# Patient Record
Sex: MALE | Race: WHITE | NOT HISPANIC OR LATINO | Employment: OTHER | URBAN - METROPOLITAN AREA
[De-identification: names, ages, dates, MRNs, and addresses within clinical notes are randomized per-mention and may not be internally consistent; named-entity substitution may affect disease eponyms.]

---

## 2017-01-01 ENCOUNTER — INFUSION (OUTPATIENT)
Dept: ONCOLOGY | Facility: HOSPITAL | Age: 72
End: 2017-01-01

## 2017-01-01 ENCOUNTER — APPOINTMENT (OUTPATIENT)
Dept: CARDIOLOGY | Facility: HOSPITAL | Age: 72
End: 2017-01-01
Attending: INTERNAL MEDICINE

## 2017-01-01 ENCOUNTER — OUTSIDE FACILITY SERVICE (OUTPATIENT)
Dept: HOSPITALIST | Facility: HOSPITAL | Age: 72
End: 2017-01-01

## 2017-01-01 ENCOUNTER — ANESTHESIA (OUTPATIENT)
Dept: GASTROENTEROLOGY | Facility: HOSPITAL | Age: 72
End: 2017-01-01

## 2017-01-01 ENCOUNTER — APPOINTMENT (OUTPATIENT)
Dept: CT IMAGING | Facility: HOSPITAL | Age: 72
End: 2017-01-01

## 2017-01-01 ENCOUNTER — APPOINTMENT (OUTPATIENT)
Dept: CT IMAGING | Facility: HOSPITAL | Age: 72
End: 2017-01-01
Attending: HOSPITALIST

## 2017-01-01 ENCOUNTER — APPOINTMENT (OUTPATIENT)
Dept: NEUROLOGY | Facility: HOSPITAL | Age: 72
End: 2017-01-01
Attending: PSYCHIATRY & NEUROLOGY

## 2017-01-01 ENCOUNTER — APPOINTMENT (OUTPATIENT)
Dept: CT IMAGING | Facility: HOSPITAL | Age: 72
End: 2017-01-01
Attending: PSYCHIATRY & NEUROLOGY

## 2017-01-01 ENCOUNTER — APPOINTMENT (OUTPATIENT)
Dept: MRI IMAGING | Facility: HOSPITAL | Age: 72
End: 2017-01-01

## 2017-01-01 ENCOUNTER — APPOINTMENT (OUTPATIENT)
Dept: GENERAL RADIOLOGY | Facility: HOSPITAL | Age: 72
End: 2017-01-01

## 2017-01-01 ENCOUNTER — HOSPITAL ENCOUNTER (OUTPATIENT)
Dept: CT IMAGING | Facility: HOSPITAL | Age: 72
Discharge: HOME | End: 2017-04-11

## 2017-01-01 ENCOUNTER — HOSPITAL ENCOUNTER (OUTPATIENT)
Dept: CARDIOLOGY | Facility: HOSPITAL | Age: 72
Setting detail: RECURRING SERIES
Discharge: HOME | End: 2017-01-19

## 2017-01-01 ENCOUNTER — APPOINTMENT (OUTPATIENT)
Dept: MRI IMAGING | Facility: HOSPITAL | Age: 72
End: 2017-01-01
Attending: INTERNAL MEDICINE

## 2017-01-01 ENCOUNTER — HOSPITAL ENCOUNTER (INPATIENT)
Facility: HOSPITAL | Age: 72
LOS: 5 days | Discharge: NURSING, REHAB OR LONG-TERM CARE | End: 2017-03-17
Attending: EMERGENCY MEDICINE | Admitting: INTERNAL MEDICINE

## 2017-01-01 ENCOUNTER — HOSPITAL ENCOUNTER (INPATIENT)
Facility: HOSPITAL | Age: 72
LOS: 3 days | Discharge: HOME | End: 2017-02-16
Attending: EMERGENCY MEDICINE | Admitting: INTERNAL MEDICINE

## 2017-01-01 ENCOUNTER — HOSPITAL ENCOUNTER (INPATIENT)
Facility: HOSPITAL | Age: 72
LOS: 18 days | Discharge: NURSING, REHAB OR LONG-TERM CARE | End: 2017-04-15
Attending: EMERGENCY MEDICINE | Admitting: HOSPITALIST

## 2017-01-01 ENCOUNTER — ANESTHESIA EVENT (OUTPATIENT)
Dept: GASTROENTEROLOGY | Facility: HOSPITAL | Age: 72
End: 2017-01-01

## 2017-01-01 ENCOUNTER — TELEPHONE (OUTPATIENT)
Dept: NEUROSURGERY | Facility: CLINIC | Age: 72
End: 2017-01-01

## 2017-01-01 VITALS
RESPIRATION RATE: 18 BRPM | BODY MASS INDEX: 27.7 KG/M2 | SYSTOLIC BLOOD PRESSURE: 98 MMHG | HEART RATE: 122 BPM | DIASTOLIC BLOOD PRESSURE: 68 MMHG | TEMPERATURE: 98.5 F | HEIGHT: 70 IN | WEIGHT: 193.5 LBS | OXYGEN SATURATION: 90 %

## 2017-01-01 VITALS
HEIGHT: 69 IN | DIASTOLIC BLOOD PRESSURE: 79 MMHG | HEART RATE: 83 BPM | WEIGHT: 210.5 LBS | OXYGEN SATURATION: 95 % | BODY MASS INDEX: 31.18 KG/M2 | SYSTOLIC BLOOD PRESSURE: 124 MMHG | RESPIRATION RATE: 18 BRPM | TEMPERATURE: 97.8 F

## 2017-01-01 VITALS
TEMPERATURE: 98.9 F | DIASTOLIC BLOOD PRESSURE: 61 MMHG | WEIGHT: 190.1 LBS | SYSTOLIC BLOOD PRESSURE: 128 MMHG | RESPIRATION RATE: 18 BRPM | BODY MASS INDEX: 25.75 KG/M2 | HEIGHT: 72 IN | OXYGEN SATURATION: 97 % | HEART RATE: 89 BPM

## 2017-01-01 VITALS — TEMPERATURE: 98.5 F

## 2017-01-01 VITALS
SYSTOLIC BLOOD PRESSURE: 134 MMHG | DIASTOLIC BLOOD PRESSURE: 84 MMHG | HEART RATE: 82 BPM | BODY MASS INDEX: 28.8 KG/M2 | OXYGEN SATURATION: 99 % | TEMPERATURE: 97.5 F | WEIGHT: 195 LBS

## 2017-01-01 DIAGNOSIS — I48.20 CHRONIC ATRIAL FIBRILLATION (HCC): ICD-10-CM

## 2017-01-01 DIAGNOSIS — E53.8 B12 DEFICIENCY: Primary | ICD-10-CM

## 2017-01-01 DIAGNOSIS — R77.8 ELEVATED TROPONIN: ICD-10-CM

## 2017-01-01 DIAGNOSIS — D62 ACUTE POSTHEMORRHAGIC ANEMIA: Primary | ICD-10-CM

## 2017-01-01 DIAGNOSIS — S06.5XAA SUBDURAL HEMATOMA (HCC): ICD-10-CM

## 2017-01-01 DIAGNOSIS — R26.2 DIFFICULTY WALKING: ICD-10-CM

## 2017-01-01 DIAGNOSIS — R19.5 OCCULT GI BLEEDING: ICD-10-CM

## 2017-01-01 DIAGNOSIS — G92.8 TOXIC METABOLIC ENCEPHALOPATHY: ICD-10-CM

## 2017-01-01 DIAGNOSIS — S40.022A CONTUSION OF LEFT UPPER ARM, INITIAL ENCOUNTER: ICD-10-CM

## 2017-01-01 DIAGNOSIS — R55 SYNCOPE AND COLLAPSE: Primary | ICD-10-CM

## 2017-01-01 DIAGNOSIS — R41.3 AMNESIA: ICD-10-CM

## 2017-01-01 DIAGNOSIS — T14.8XXA BRUISING: ICD-10-CM

## 2017-01-01 DIAGNOSIS — T45.515A WARFARIN-INDUCED COAGULOPATHY (HCC): ICD-10-CM

## 2017-01-01 DIAGNOSIS — E53.8 B12 DEFICIENCY: ICD-10-CM

## 2017-01-01 DIAGNOSIS — S06.5XAA SDH (SUBDURAL HEMATOMA) (HCC): Primary | ICD-10-CM

## 2017-01-01 DIAGNOSIS — N30.01 ACUTE CYSTITIS WITH HEMATURIA: Primary | ICD-10-CM

## 2017-01-01 DIAGNOSIS — S09.90XA HEAD INJURY, INITIAL ENCOUNTER: ICD-10-CM

## 2017-01-01 DIAGNOSIS — E83.42 HYPOMAGNESEMIA: ICD-10-CM

## 2017-01-01 DIAGNOSIS — D68.32 WARFARIN-INDUCED COAGULOPATHY (HCC): ICD-10-CM

## 2017-01-01 LAB
ABO + RH BLD: NORMAL
ABO + RH BLD: NORMAL
ABO GROUP BLD: NORMAL
ABO GROUP BLD: NORMAL
ALBUMIN SERPL-MCNC: 2.4 G/DL (ref 3.5–5.2)
ALBUMIN SERPL-MCNC: 2.7 G/DL (ref 3.5–5.2)
ALBUMIN SERPL-MCNC: 2.8 G/DL (ref 3.5–5.2)
ALBUMIN SERPL-MCNC: 2.9 G/DL (ref 3.5–5.2)
ALBUMIN SERPL-MCNC: 3.4 G/DL (ref 3.5–5.2)
ALBUMIN SERPL-MCNC: 4 G/DL (ref 3.5–5.2)
ALBUMIN/GLOB SERPL: 0.7 G/DL
ALBUMIN/GLOB SERPL: 0.9 G/DL
ALBUMIN/GLOB SERPL: 0.9 G/DL
ALBUMIN/GLOB SERPL: 1.1 G/DL
ALBUMIN/GLOB SERPL: 1.2 G/DL
ALBUMIN/GLOB SERPL: 1.5 G/DL
ALP SERPL-CCNC: 102 U/L (ref 39–117)
ALP SERPL-CCNC: 104 U/L (ref 39–117)
ALP SERPL-CCNC: 108 U/L (ref 39–117)
ALP SERPL-CCNC: 112 U/L (ref 39–117)
ALP SERPL-CCNC: 139 U/L (ref 39–117)
ALP SERPL-CCNC: 139 U/L (ref 39–117)
ALT SERPL W P-5'-P-CCNC: 23 U/L (ref 1–41)
ALT SERPL W P-5'-P-CCNC: 25 U/L (ref 1–41)
ALT SERPL W P-5'-P-CCNC: 27 U/L (ref 1–41)
ALT SERPL W P-5'-P-CCNC: 31 U/L (ref 1–41)
ALT SERPL W P-5'-P-CCNC: 35 U/L (ref 1–41)
ALT SERPL W P-5'-P-CCNC: 65 U/L (ref 1–41)
AMMONIA BLD-SCNC: 28 UMOL/L (ref 16–60)
ANION GAP SERPL CALCULATED.3IONS-SCNC: 10.4 MMOL/L
ANION GAP SERPL CALCULATED.3IONS-SCNC: 10.9 MMOL/L
ANION GAP SERPL CALCULATED.3IONS-SCNC: 11 MMOL/L
ANION GAP SERPL CALCULATED.3IONS-SCNC: 11.1 MMOL/L
ANION GAP SERPL CALCULATED.3IONS-SCNC: 11.3 MMOL/L
ANION GAP SERPL CALCULATED.3IONS-SCNC: 11.8 MMOL/L
ANION GAP SERPL CALCULATED.3IONS-SCNC: 12.1 MMOL/L
ANION GAP SERPL CALCULATED.3IONS-SCNC: 12.7 MMOL/L
ANION GAP SERPL CALCULATED.3IONS-SCNC: 13 MMOL/L
ANION GAP SERPL CALCULATED.3IONS-SCNC: 13.7 MMOL/L
ANION GAP SERPL CALCULATED.3IONS-SCNC: 14 MMOL/L
ANION GAP SERPL CALCULATED.3IONS-SCNC: 14.4 MMOL/L
ANION GAP SERPL CALCULATED.3IONS-SCNC: 14.5 MMOL/L
ANION GAP SERPL CALCULATED.3IONS-SCNC: 14.6 MMOL/L
ANION GAP SERPL CALCULATED.3IONS-SCNC: 15.5 MMOL/L
ANION GAP SERPL CALCULATED.3IONS-SCNC: 16.4 MMOL/L
ANION GAP SERPL CALCULATED.3IONS-SCNC: 16.5 MMOL/L
ANION GAP SERPL CALCULATED.3IONS-SCNC: 17.1 MMOL/L
ANION GAP SERPL CALCULATED.3IONS-SCNC: 17.8 MMOL/L
ANION GAP SERPL CALCULATED.3IONS-SCNC: 18.2 MMOL/L
ANION GAP SERPL CALCULATED.3IONS-SCNC: 9.6 MMOL/L
ANION GAP SERPL CALCULATED.3IONS-SCNC: 9.6 MMOL/L
ANION GAP SERPL CALCULATED.3IONS-SCNC: 9.9 MMOL/L
APTT PPP: 33.7 SECONDS (ref 22.7–35.4)
ARTERIAL PATENCY WRIST A: POSITIVE
ASCENDING AORTA: 2.8 CM
AST SERPL-CCNC: 20 U/L (ref 1–40)
AST SERPL-CCNC: 26 U/L (ref 1–40)
AST SERPL-CCNC: 32 U/L (ref 1–40)
AST SERPL-CCNC: 46 U/L (ref 1–40)
AST SERPL-CCNC: 47 U/L (ref 1–40)
AST SERPL-CCNC: 60 U/L (ref 1–40)
ATMOSPHERIC PRESS: 739.3 MMHG
BACTERIA SPEC AEROBE CULT: ABNORMAL
BACTERIA SPEC AEROBE CULT: ABNORMAL
BACTERIA SPEC AEROBE CULT: NORMAL
BACTERIA SPEC AEROBE CULT: NORMAL
BACTERIA UR QL AUTO: ABNORMAL /HPF
BASE EXCESS BLDA CALC-SCNC: 1.5 MMOL/L (ref 0–2)
BASOPHILS # BLD AUTO: 0.01 10*3/MM3 (ref 0–0.2)
BASOPHILS # BLD AUTO: 0.01 10*3/MM3 (ref 0–0.2)
BASOPHILS # BLD AUTO: 0.02 10*3/MM3 (ref 0–0.2)
BASOPHILS # BLD AUTO: 0.03 10*3/MM3 (ref 0–0.2)
BASOPHILS # BLD AUTO: 0.04 10*3/MM3 (ref 0–0.2)
BASOPHILS NFR BLD AUTO: 0.1 % (ref 0–1.5)
BASOPHILS NFR BLD AUTO: 0.1 % (ref 0–1.5)
BASOPHILS NFR BLD AUTO: 0.2 % (ref 0–1.5)
BASOPHILS NFR BLD AUTO: 0.3 % (ref 0–1.5)
BASOPHILS NFR BLD AUTO: 0.4 % (ref 0–1.5)
BASOPHILS NFR BLD AUTO: 0.4 % (ref 0–1.5)
BASOPHILS NFR BLD AUTO: 0.5 % (ref 0–1.5)
BASOPHILS NFR BLD AUTO: 0.6 % (ref 0–1.5)
BDY SITE: ABNORMAL
BH BB BLOOD EXPIRATION DATE: NORMAL
BH BB BLOOD EXPIRATION DATE: NORMAL
BH BB BLOOD TYPE BARCODE: 5100
BH BB BLOOD TYPE BARCODE: 5100
BH BB DISPENSE STATUS: NORMAL
BH BB DISPENSE STATUS: NORMAL
BH BB PRODUCT CODE: NORMAL
BH BB PRODUCT CODE: NORMAL
BH BB UNIT NUMBER: NORMAL
BH BB UNIT NUMBER: NORMAL
BH CV ECHO MEAS - ACS: 1.8 CM
BH CV ECHO MEAS - ACS: 1.8 CM
BH CV ECHO MEAS - AO MEAN PG (FULL): 1 MMHG
BH CV ECHO MEAS - AO MEAN PG (FULL): 3 MMHG
BH CV ECHO MEAS - AO MEAN PG: 4 MMHG
BH CV ECHO MEAS - AO MEAN PG: 4 MMHG
BH CV ECHO MEAS - AO ROOT AREA (BSA CORRECTED): 1.5
BH CV ECHO MEAS - AO ROOT AREA (BSA CORRECTED): 1.6
BH CV ECHO MEAS - AO ROOT AREA: 8 CM^2
BH CV ECHO MEAS - AO ROOT AREA: 8 CM^2
BH CV ECHO MEAS - AO ROOT DIAM: 3.2 CM
BH CV ECHO MEAS - AO ROOT DIAM: 3.2 CM
BH CV ECHO MEAS - AO V2 MAX: 140 CM/SEC
BH CV ECHO MEAS - AO V2 MEAN: 91.8 CM/SEC
BH CV ECHO MEAS - AO V2 MEAN: 92.4 CM/SEC
BH CV ECHO MEAS - AO V2 VTI: 23.9 CM
BH CV ECHO MEAS - AO V2 VTI: 27.9 CM
BH CV ECHO MEAS - ASC AORTA: 2.8 CM
BH CV ECHO MEAS - AVA(I,A): 2 CM^2
BH CV ECHO MEAS - AVA(I,A): 2.6 CM^2
BH CV ECHO MEAS - AVA(I,D): 2 CM^2
BH CV ECHO MEAS - AVA(I,D): 2.6 CM^2
BH CV ECHO MEAS - BSA(HAYCOCK): 2.1 M^2
BH CV ECHO MEAS - BSA(HAYCOCK): 2.2 M^2
BH CV ECHO MEAS - BSA: 2 M^2
BH CV ECHO MEAS - BSA: 2.1 M^2
BH CV ECHO MEAS - BZI_BMI: 30.1 KILOGRAMS/M^2
BH CV ECHO MEAS - BZI_BMI: 30.4 KILOGRAMS/M^2
BH CV ECHO MEAS - BZI_METRIC_HEIGHT: 170.2 CM
BH CV ECHO MEAS - BZI_METRIC_HEIGHT: 175.3 CM
BH CV ECHO MEAS - BZI_METRIC_WEIGHT: 87.1 KG
BH CV ECHO MEAS - BZI_METRIC_WEIGHT: 93.4 KG
BH CV ECHO MEAS - CONTRAST EF (2CH): 57.7 ML/M^2
BH CV ECHO MEAS - CONTRAST EF (2CH): 61.2 ML/M^2
BH CV ECHO MEAS - CONTRAST EF 4CH: 56.7 ML/M^2
BH CV ECHO MEAS - CONTRAST EF 4CH: 57.1 ML/M^2
BH CV ECHO MEAS - EDV(CUBED): 110.6 ML
BH CV ECHO MEAS - EDV(CUBED): 12.2 ML
BH CV ECHO MEAS - EDV(MOD-SP2): 103 ML
BH CV ECHO MEAS - EDV(MOD-SP2): 71 ML
BH CV ECHO MEAS - EDV(MOD-SP4): 104 ML
BH CV ECHO MEAS - EDV(MOD-SP4): 84 ML
BH CV ECHO MEAS - EDV(TEICH): 107.5 ML
BH CV ECHO MEAS - EDV(TEICH): 18.1 ML
BH CV ECHO MEAS - EF(CUBED): 61.2 %
BH CV ECHO MEAS - EF(CUBED): 66.3 %
BH CV ECHO MEAS - EF(MOD-SP2): 57.7 %
BH CV ECHO MEAS - EF(MOD-SP2): 61.2 %
BH CV ECHO MEAS - EF(MOD-SP4): 56.7 %
BH CV ECHO MEAS - EF(MOD-SP4): 57.1 %
BH CV ECHO MEAS - EF(TEICH): 52.7 %
BH CV ECHO MEAS - EF(TEICH): 60.4 %
BH CV ECHO MEAS - ESV(CUBED): 4.1 ML
BH CV ECHO MEAS - ESV(CUBED): 42.9 ML
BH CV ECHO MEAS - ESV(MOD-SP2): 30 ML
BH CV ECHO MEAS - ESV(MOD-SP2): 40 ML
BH CV ECHO MEAS - ESV(MOD-SP4): 36 ML
BH CV ECHO MEAS - ESV(MOD-SP4): 45 ML
BH CV ECHO MEAS - ESV(TEICH): 50.9 ML
BH CV ECHO MEAS - ESV(TEICH): 7.2 ML
BH CV ECHO MEAS - FS: 27.1 %
BH CV ECHO MEAS - FS: 30.4 %
BH CV ECHO MEAS - IVS/LVPW: 0.91
BH CV ECHO MEAS - IVS/LVPW: 1
BH CV ECHO MEAS - IVSD: 1 CM
BH CV ECHO MEAS - IVSD: 1.1 CM
BH CV ECHO MEAS - LAT PEAK E' VEL: 12 CM/SEC
BH CV ECHO MEAS - LAT PEAK E' VEL: 12 CM/SEC
BH CV ECHO MEAS - LV DIASTOLIC VOL/BSA (35-75): 42.3 ML/M^2
BH CV ECHO MEAS - LV DIASTOLIC VOL/BSA (35-75): 49.7 ML/M^2
BH CV ECHO MEAS - LV MASS(C)D: 181.9 GRAMS
BH CV ECHO MEAS - LV MASS(C)D: 66.3 GRAMS
BH CV ECHO MEAS - LV MASS(C)DI: 33.4 GRAMS/M^2
BH CV ECHO MEAS - LV MASS(C)DI: 87 GRAMS/M^2
BH CV ECHO MEAS - LV MEAN PG: 1 MMHG
BH CV ECHO MEAS - LV MEAN PG: 3 MMHG
BH CV ECHO MEAS - LV SYSTOLIC VOL/BSA (12-30): 18.1 ML/M^2
BH CV ECHO MEAS - LV SYSTOLIC VOL/BSA (12-30): 21.5 ML/M^2
BH CV ECHO MEAS - LV V1 MAX: 137 CM/SEC
BH CV ECHO MEAS - LV V1 MEAN: 48.5 CM/SEC
BH CV ECHO MEAS - LV V1 MEAN: 76.4 CM/SEC
BH CV ECHO MEAS - LV V1 VTI: 16.2 CM
BH CV ECHO MEAS - LV V1 VTI: 19.6 CM
BH CV ECHO MEAS - LVIDD: 2.3 CM
BH CV ECHO MEAS - LVIDD: 4.8 CM
BH CV ECHO MEAS - LVIDS: 1.6 CM
BH CV ECHO MEAS - LVIDS: 3.5 CM
BH CV ECHO MEAS - LVLD AP2: 7.4 CM
BH CV ECHO MEAS - LVLD AP2: 7.4 CM
BH CV ECHO MEAS - LVLD AP4: 7.3 CM
BH CV ECHO MEAS - LVLD AP4: 8 CM
BH CV ECHO MEAS - LVLS AP2: 6.3 CM
BH CV ECHO MEAS - LVLS AP2: 6.4 CM
BH CV ECHO MEAS - LVLS AP4: 6.7 CM
BH CV ECHO MEAS - LVLS AP4: 6.8 CM
BH CV ECHO MEAS - LVOT AREA (M): 3.1 CM^2
BH CV ECHO MEAS - LVOT AREA (M): 3.5 CM^2
BH CV ECHO MEAS - LVOT AREA: 3.1 CM^2
BH CV ECHO MEAS - LVOT AREA: 3.5 CM^2
BH CV ECHO MEAS - LVOT DIAM: 2 CM
BH CV ECHO MEAS - LVOT DIAM: 2.1 CM
BH CV ECHO MEAS - LVPWD: 1.1 CM
BH CV ECHO MEAS - LVPWD: 1.1 CM
BH CV ECHO MEAS - MED PEAK E' VEL: 11 CM/SEC
BH CV ECHO MEAS - MED PEAK E' VEL: 9 CM/SEC
BH CV ECHO MEAS - MV A DUR: 0.06 SEC
BH CV ECHO MEAS - MV A DUR: 0.12 SEC
BH CV ECHO MEAS - MV A MAX VEL: 109 CM/SEC
BH CV ECHO MEAS - MV A MAX VEL: 37.5 CM/SEC
BH CV ECHO MEAS - MV DEC SLOPE: 435 CM/SEC^2
BH CV ECHO MEAS - MV DEC SLOPE: 646 CM/SEC^2
BH CV ECHO MEAS - MV DEC TIME: 0.16 SEC
BH CV ECHO MEAS - MV DEC TIME: 0.18 SEC
BH CV ECHO MEAS - MV E MAX VEL: 108 CM/SEC
BH CV ECHO MEAS - MV E MAX VEL: 81.4 CM/SEC
BH CV ECHO MEAS - MV E/A: 0.99
BH CV ECHO MEAS - MV E/A: 2.2
BH CV ECHO MEAS - MV MEAN PG: 1 MMHG
BH CV ECHO MEAS - MV MEAN PG: 2 MMHG
BH CV ECHO MEAS - MV P1/2T MAX VEL: 85.5 CM/SEC
BH CV ECHO MEAS - MV P1/2T MAX VEL: 96 CM/SEC
BH CV ECHO MEAS - MV P1/2T: 38.8 MSEC
BH CV ECHO MEAS - MV P1/2T: 64.6 MSEC
BH CV ECHO MEAS - MV V2 MEAN: 45.8 CM/SEC
BH CV ECHO MEAS - MV V2 MEAN: 62.1 CM/SEC
BH CV ECHO MEAS - MV V2 VTI: 16 CM
BH CV ECHO MEAS - MV V2 VTI: 17.1 CM
BH CV ECHO MEAS - MVA P1/2T LCG: 2.3 CM^2
BH CV ECHO MEAS - MVA P1/2T LCG: 2.6 CM^2
BH CV ECHO MEAS - MVA(P1/2T): 3.4 CM^2
BH CV ECHO MEAS - MVA(P1/2T): 5.7 CM^2
BH CV ECHO MEAS - MVA(VTI): 3.3 CM^2
BH CV ECHO MEAS - MVA(VTI): 3.8 CM^2
BH CV ECHO MEAS - PA ACC SLOPE: 55.1 CM/SEC^2
BH CV ECHO MEAS - PA ACC SLOPE: 8.3 CM/SEC^2
BH CV ECHO MEAS - PA ACC TIME: 0.1 SEC
BH CV ECHO MEAS - PA ACC TIME: 0.12 SEC
BH CV ECHO MEAS - PA MAX PG (FULL): 6.4 MMHG
BH CV ECHO MEAS - PA MAX PG: 3.1 MMHG
BH CV ECHO MEAS - PA MAX PG: 8.9 MMHG
BH CV ECHO MEAS - PA PR(ACCEL): 26.8 MMHG
BH CV ECHO MEAS - PA PR(ACCEL): 34.5 MMHG
BH CV ECHO MEAS - PA V2 MAX: 149 CM/SEC
BH CV ECHO MEAS - PA V2 MAX: 88.2 CM/SEC
BH CV ECHO MEAS - PI END-D VEL: 124.1 CM/SEC
BH CV ECHO MEAS - PULM A REVS DUR: 0.1 SEC
BH CV ECHO MEAS - PULM A REVS VEL: 31.6 CM/SEC
BH CV ECHO MEAS - PULM DIAS VEL: 39 CM/SEC
BH CV ECHO MEAS - PULM DIAS VEL: 72.3 CM/SEC
BH CV ECHO MEAS - PULM S/D: 0.6
BH CV ECHO MEAS - PULM S/D: 1.1
BH CV ECHO MEAS - PULM SYS VEL: 42 CM/SEC
BH CV ECHO MEAS - PULM SYS VEL: 43.3 CM/SEC
BH CV ECHO MEAS - PVA(V,A): 2.4 CM^2
BH CV ECHO MEAS - PVA(V,D): 2.4 CM^2
BH CV ECHO MEAS - QP/QS: 0.6
BH CV ECHO MEAS - QP/QS: 0.87
BH CV ECHO MEAS - RAP SYSTOLE: 3 MMHG
BH CV ECHO MEAS - RAP SYSTOLE: 8 MMHG
BH CV ECHO MEAS - RV MAX PG: 2.4 MMHG
BH CV ECHO MEAS - RV MEAN PG: 1 MMHG
BH CV ECHO MEAS - RV MEAN PG: 1 MMHG
BH CV ECHO MEAS - RV V1 MAX: 78.2 CM/SEC
BH CV ECHO MEAS - RV V1 MEAN: 31.1 CM/SEC
BH CV ECHO MEAS - RV V1 MEAN: 47.9 CM/SEC
BH CV ECHO MEAS - RV V1 VTI: 10.7 CM
BH CV ECHO MEAS - RV V1 VTI: 11.8 CM
BH CV ECHO MEAS - RVOT AREA: 3.1 CM^2
BH CV ECHO MEAS - RVOT AREA: 4.5 CM^2
BH CV ECHO MEAS - RVOT DIAM: 2 CM
BH CV ECHO MEAS - RVOT DIAM: 2.4 CM
BH CV ECHO MEAS - RVSP: 26.8 MMHG
BH CV ECHO MEAS - RVSP: 56 MMHG
BH CV ECHO MEAS - SI(AO): 107.3 ML/M^2
BH CV ECHO MEAS - SI(AO): 96.7 ML/M^2
BH CV ECHO MEAS - SI(CUBED): 32.4 ML/M^2
BH CV ECHO MEAS - SI(CUBED): 4.1 ML/M^2
BH CV ECHO MEAS - SI(LVOT): 26.7 ML/M^2
BH CV ECHO MEAS - SI(LVOT): 31 ML/M^2
BH CV ECHO MEAS - SI(MOD-SP2): 20.6 ML/M^2
BH CV ECHO MEAS - SI(MOD-SP2): 30.1 ML/M^2
BH CV ECHO MEAS - SI(MOD-SP4): 24.2 ML/M^2
BH CV ECHO MEAS - SI(MOD-SP4): 28.2 ML/M^2
BH CV ECHO MEAS - SI(TEICH): 27.1 ML/M^2
BH CV ECHO MEAS - SI(TEICH): 5.5 ML/M^2
BH CV ECHO MEAS - SUP REN AO DIAM: 2.35 CM
BH CV ECHO MEAS - SV(AO): 192.2 ML
BH CV ECHO MEAS - SV(AO): 224.4 ML
BH CV ECHO MEAS - SV(CUBED): 67.7 ML
BH CV ECHO MEAS - SV(CUBED): 8.1 ML
BH CV ECHO MEAS - SV(LVOT): 55.9 ML
BH CV ECHO MEAS - SV(LVOT): 61.6 ML
BH CV ECHO MEAS - SV(MOD-SP2): 41 ML
BH CV ECHO MEAS - SV(MOD-SP2): 63 ML
BH CV ECHO MEAS - SV(MOD-SP4): 48 ML
BH CV ECHO MEAS - SV(MOD-SP4): 59 ML
BH CV ECHO MEAS - SV(RVOT): 33.6 ML
BH CV ECHO MEAS - SV(RVOT): 53.4 ML
BH CV ECHO MEAS - SV(TEICH): 11 ML
BH CV ECHO MEAS - SV(TEICH): 56.7 ML
BH CV ECHO MEAS - TAPSE (>1.6): 1.5 CM2
BH CV ECHO MEAS - TAPSE (>1.6): 2 CM2
BH CV ECHO MEAS - TR MAX VEL: 244 CM/SEC
BH CV ECHO MEAS - TR MAX VEL: 346 CM/SEC
BH CV XLRA - RV BASE: 2.8 CM
BH CV XLRA - RV BASE: 3 CM
BH CV XLRA - TDI S': 13 CM/SEC
BH CV XLRA - TDI S': 8 CM/SEC
BH CV XLRA MEAS LEFT CCA RATIO VEL: -70.8 CM/SEC
BH CV XLRA MEAS LEFT DIST CCA EDV: -16.3 CM/SEC
BH CV XLRA MEAS LEFT DIST CCA PSV: -70.8 CM/SEC
BH CV XLRA MEAS LEFT DIST ICA EDV: -10 CM/SEC
BH CV XLRA MEAS LEFT DIST ICA PSV: -33 CM/SEC
BH CV XLRA MEAS LEFT ICA RATIO VEL: -87 CM/SEC
BH CV XLRA MEAS LEFT ICA/CCA RATIO: 1.2
BH CV XLRA MEAS LEFT MID ICA EDV: -24.6 CM/SEC
BH CV XLRA MEAS LEFT MID ICA PSV: -73.1 CM/SEC
BH CV XLRA MEAS LEFT PROX CCA EDV: 11.5 CM/SEC
BH CV XLRA MEAS LEFT PROX CCA PSV: 61.3 CM/SEC
BH CV XLRA MEAS LEFT PROX ECA EDV: -15.2 CM/SEC
BH CV XLRA MEAS LEFT PROX ECA PSV: -196 CM/SEC
BH CV XLRA MEAS LEFT PROX ICA EDV: -25.7 CM/SEC
BH CV XLRA MEAS LEFT PROX ICA PSV: -87 CM/SEC
BH CV XLRA MEAS LEFT PROX SCLA PSV: 145 CM/SEC
BH CV XLRA MEAS LEFT VERTEBRAL A EDV: 8.2 CM/SEC
BH CV XLRA MEAS LEFT VERTEBRAL A PSV: 21.7 CM/SEC
BH CV XLRA MEAS RIGHT CCA RATIO VEL: 47.5 CM/SEC
BH CV XLRA MEAS RIGHT DIST CCA EDV: 7 CM/SEC
BH CV XLRA MEAS RIGHT DIST CCA PSV: 47.5 CM/SEC
BH CV XLRA MEAS RIGHT DIST ICA EDV: -19.3 CM/SEC
BH CV XLRA MEAS RIGHT DIST ICA PSV: -73.9 CM/SEC
BH CV XLRA MEAS RIGHT ICA RATIO VEL: -39.9 CM/SEC
BH CV XLRA MEAS RIGHT ICA/CCA RATIO: -0.84
BH CV XLRA MEAS RIGHT MID ICA EDV: -29.3 CM/SEC
BH CV XLRA MEAS RIGHT MID ICA PSV: -79.2 CM/SEC
BH CV XLRA MEAS RIGHT PROX CCA EDV: -11.7 CM/SEC
BH CV XLRA MEAS RIGHT PROX CCA PSV: -78.6 CM/SEC
BH CV XLRA MEAS RIGHT PROX ECA EDV: -14.1 CM/SEC
BH CV XLRA MEAS RIGHT PROX ECA PSV: -115 CM/SEC
BH CV XLRA MEAS RIGHT PROX ICA EDV: -12.3 CM/SEC
BH CV XLRA MEAS RIGHT PROX ICA PSV: -39.9 CM/SEC
BH CV XLRA MEAS RIGHT PROX SCLA PSV: 59.8 CM/SEC
BH CV XLRA MEAS RIGHT VERTEBRAL A EDV: -8.8 CM/SEC
BH CV XLRA MEAS RIGHT VERTEBRAL A PSV: -25.8 CM/SEC
BILIRUB SERPL-MCNC: 0.4 MG/DL (ref 0.1–1.2)
BILIRUB SERPL-MCNC: 0.5 MG/DL (ref 0.1–1.2)
BILIRUB SERPL-MCNC: 0.7 MG/DL (ref 0.1–1.2)
BILIRUB SERPL-MCNC: 1.5 MG/DL (ref 0.1–1.2)
BILIRUB SERPL-MCNC: 1.8 MG/DL (ref 0.1–1.2)
BILIRUB SERPL-MCNC: 2.3 MG/DL (ref 0.1–1.2)
BILIRUB UR QL STRIP: NEGATIVE
BLD GP AB SCN SERPL QL: NEGATIVE
BLD GP AB SCN SERPL QL: NEGATIVE
BUN BLD-MCNC: 10 MG/DL (ref 8–23)
BUN BLD-MCNC: 11 MG/DL (ref 8–23)
BUN BLD-MCNC: 12 MG/DL (ref 8–23)
BUN BLD-MCNC: 13 MG/DL (ref 8–23)
BUN BLD-MCNC: 14 MG/DL (ref 8–23)
BUN BLD-MCNC: 14 MG/DL (ref 8–23)
BUN BLD-MCNC: 15 MG/DL (ref 8–23)
BUN BLD-MCNC: 15 MG/DL (ref 8–23)
BUN BLD-MCNC: 17 MG/DL (ref 8–23)
BUN BLD-MCNC: 18 MG/DL (ref 8–23)
BUN BLD-MCNC: 22 MG/DL (ref 8–23)
BUN BLD-MCNC: 5 MG/DL (ref 8–23)
BUN BLD-MCNC: 6 MG/DL (ref 8–23)
BUN BLD-MCNC: 6 MG/DL (ref 8–23)
BUN BLD-MCNC: 7 MG/DL (ref 8–23)
BUN BLD-MCNC: 8 MG/DL (ref 8–23)
BUN BLD-MCNC: 8 MG/DL (ref 8–23)
BUN BLD-MCNC: 9 MG/DL (ref 8–23)
BUN/CREAT SERPL: 10 (ref 7–25)
BUN/CREAT SERPL: 12 (ref 7–25)
BUN/CREAT SERPL: 12.5 (ref 7–25)
BUN/CREAT SERPL: 13.6 (ref 7–25)
BUN/CREAT SERPL: 14.8 (ref 7–25)
BUN/CREAT SERPL: 16.9 (ref 7–25)
BUN/CREAT SERPL: 17.1 (ref 7–25)
BUN/CREAT SERPL: 17.5 (ref 7–25)
BUN/CREAT SERPL: 17.5 (ref 7–25)
BUN/CREAT SERPL: 19.7 (ref 7–25)
BUN/CREAT SERPL: 19.7 (ref 7–25)
BUN/CREAT SERPL: 20 (ref 7–25)
BUN/CREAT SERPL: 20.6 (ref 7–25)
BUN/CREAT SERPL: 21.4 (ref 7–25)
BUN/CREAT SERPL: 21.4 (ref 7–25)
BUN/CREAT SERPL: 22.2 (ref 7–25)
BUN/CREAT SERPL: 22.7 (ref 7–25)
BUN/CREAT SERPL: 23.6 (ref 7–25)
BUN/CREAT SERPL: 23.6 (ref 7–25)
BUN/CREAT SERPL: 25 (ref 7–25)
BUN/CREAT SERPL: 27.7 (ref 7–25)
BUN/CREAT SERPL: 7.6 (ref 7–25)
BUN/CREAT SERPL: 9.4 (ref 7–25)
C DIFF TOX GENS STL QL NAA+PROBE: NEGATIVE
CALCIUM SPEC-SCNC: 7.5 MG/DL (ref 8.6–10.5)
CALCIUM SPEC-SCNC: 7.8 MG/DL (ref 8.6–10.5)
CALCIUM SPEC-SCNC: 8 MG/DL (ref 8.6–10.5)
CALCIUM SPEC-SCNC: 8 MG/DL (ref 8.6–10.5)
CALCIUM SPEC-SCNC: 8.1 MG/DL (ref 8.6–10.5)
CALCIUM SPEC-SCNC: 8.2 MG/DL (ref 8.6–10.5)
CALCIUM SPEC-SCNC: 8.2 MG/DL (ref 8.6–10.5)
CALCIUM SPEC-SCNC: 8.3 MG/DL (ref 8.6–10.5)
CALCIUM SPEC-SCNC: 8.4 MG/DL (ref 8.6–10.5)
CALCIUM SPEC-SCNC: 8.4 MG/DL (ref 8.6–10.5)
CALCIUM SPEC-SCNC: 8.5 MG/DL (ref 8.6–10.5)
CALCIUM SPEC-SCNC: 8.5 MG/DL (ref 8.6–10.5)
CALCIUM SPEC-SCNC: 8.6 MG/DL (ref 8.6–10.5)
CALCIUM SPEC-SCNC: 9 MG/DL (ref 8.6–10.5)
CALCIUM SPEC-SCNC: 9.3 MG/DL (ref 8.6–10.5)
CHLORIDE SERPL-SCNC: 100 MMOL/L (ref 98–107)
CHLORIDE SERPL-SCNC: 101 MMOL/L (ref 98–107)
CHLORIDE SERPL-SCNC: 101 MMOL/L (ref 98–107)
CHLORIDE SERPL-SCNC: 102 MMOL/L (ref 98–107)
CHLORIDE SERPL-SCNC: 103 MMOL/L (ref 98–107)
CHLORIDE SERPL-SCNC: 104 MMOL/L (ref 98–107)
CHLORIDE SERPL-SCNC: 105 MMOL/L (ref 98–107)
CHLORIDE SERPL-SCNC: 106 MMOL/L (ref 98–107)
CHLORIDE SERPL-SCNC: 108 MMOL/L (ref 98–107)
CHLORIDE SERPL-SCNC: 109 MMOL/L (ref 98–107)
CHLORIDE SERPL-SCNC: 111 MMOL/L (ref 98–107)
CHLORIDE SERPL-SCNC: 111 MMOL/L (ref 98–107)
CHLORIDE SERPL-SCNC: 112 MMOL/L (ref 98–107)
CHLORIDE SERPL-SCNC: 115 MMOL/L (ref 98–107)
CHLORIDE SERPL-SCNC: 99 MMOL/L (ref 98–107)
CHOLEST SERPL-MCNC: 103 MG/DL (ref 0–200)
CLARITY UR: ABNORMAL
CLARITY UR: CLEAR
CLARITY UR: CLEAR
CO2 SERPL-SCNC: 19.6 MMOL/L (ref 22–29)
CO2 SERPL-SCNC: 20.8 MMOL/L (ref 22–29)
CO2 SERPL-SCNC: 21.2 MMOL/L (ref 22–29)
CO2 SERPL-SCNC: 21.3 MMOL/L (ref 22–29)
CO2 SERPL-SCNC: 21.5 MMOL/L (ref 22–29)
CO2 SERPL-SCNC: 21.7 MMOL/L (ref 22–29)
CO2 SERPL-SCNC: 22.4 MMOL/L (ref 22–29)
CO2 SERPL-SCNC: 22.5 MMOL/L (ref 22–29)
CO2 SERPL-SCNC: 22.6 MMOL/L (ref 22–29)
CO2 SERPL-SCNC: 22.9 MMOL/L (ref 22–29)
CO2 SERPL-SCNC: 23 MMOL/L (ref 22–29)
CO2 SERPL-SCNC: 23.5 MMOL/L (ref 22–29)
CO2 SERPL-SCNC: 24 MMOL/L (ref 22–29)
CO2 SERPL-SCNC: 24.9 MMOL/L (ref 22–29)
CO2 SERPL-SCNC: 26.3 MMOL/L (ref 22–29)
CO2 SERPL-SCNC: 26.4 MMOL/L (ref 22–29)
CO2 SERPL-SCNC: 26.9 MMOL/L (ref 22–29)
CO2 SERPL-SCNC: 27.1 MMOL/L (ref 22–29)
CO2 SERPL-SCNC: 27.1 MMOL/L (ref 22–29)
CO2 SERPL-SCNC: 27.6 MMOL/L (ref 22–29)
CO2 SERPL-SCNC: 28 MMOL/L (ref 22–29)
CO2 SERPL-SCNC: 28.2 MMOL/L (ref 22–29)
CO2 SERPL-SCNC: 30.4 MMOL/L (ref 22–29)
COLOR UR: ABNORMAL
COLOR UR: ABNORMAL
COLOR UR: YELLOW
CREAT BLD-MCNC: 0.5 MG/DL (ref 0.76–1.27)
CREAT BLD-MCNC: 0.55 MG/DL (ref 0.76–1.27)
CREAT BLD-MCNC: 0.55 MG/DL (ref 0.76–1.27)
CREAT BLD-MCNC: 0.56 MG/DL (ref 0.76–1.27)
CREAT BLD-MCNC: 0.59 MG/DL (ref 0.76–1.27)
CREAT BLD-MCNC: 0.59 MG/DL (ref 0.76–1.27)
CREAT BLD-MCNC: 0.61 MG/DL (ref 0.76–1.27)
CREAT BLD-MCNC: 0.63 MG/DL (ref 0.76–1.27)
CREAT BLD-MCNC: 0.64 MG/DL (ref 0.76–1.27)
CREAT BLD-MCNC: 0.64 MG/DL (ref 0.76–1.27)
CREAT BLD-MCNC: 0.65 MG/DL (ref 0.76–1.27)
CREAT BLD-MCNC: 0.65 MG/DL (ref 0.76–1.27)
CREAT BLD-MCNC: 0.66 MG/DL (ref 0.76–1.27)
CREAT BLD-MCNC: 0.66 MG/DL (ref 0.76–1.27)
CREAT BLD-MCNC: 0.68 MG/DL (ref 0.76–1.27)
CREAT BLD-MCNC: 0.68 MG/DL (ref 0.76–1.27)
CREAT BLD-MCNC: 0.7 MG/DL (ref 0.76–1.27)
CREAT BLD-MCNC: 0.7 MG/DL (ref 0.76–1.27)
CREAT BLD-MCNC: 0.76 MG/DL (ref 0.76–1.27)
CREAT BLD-MCNC: 0.8 MG/DL (ref 0.76–1.27)
CREAT BLD-MCNC: 0.99 MG/DL (ref 0.76–1.27)
CROSSMATCH INTERPRETATION: NORMAL
CROSSMATCH INTERPRETATION: NORMAL
D-LACTATE SERPL-SCNC: 1.6 MMOL/L (ref 0.5–2)
D-LACTATE SERPL-SCNC: 2 MMOL/L (ref 0.5–2)
DEPRECATED RDW RBC AUTO: 42.8 FL (ref 37–54)
DEPRECATED RDW RBC AUTO: 43.2 FL (ref 37–54)
DEPRECATED RDW RBC AUTO: 44.2 FL (ref 37–54)
DEPRECATED RDW RBC AUTO: 44.8 FL (ref 37–54)
DEPRECATED RDW RBC AUTO: 46.6 FL (ref 37–54)
DEPRECATED RDW RBC AUTO: 46.8 FL (ref 37–54)
DEPRECATED RDW RBC AUTO: 48.8 FL (ref 37–54)
DEPRECATED RDW RBC AUTO: 49.8 FL (ref 37–54)
DEPRECATED RDW RBC AUTO: 51.9 FL (ref 37–54)
DEPRECATED RDW RBC AUTO: 52.8 FL (ref 37–54)
DEPRECATED RDW RBC AUTO: 52.9 FL (ref 37–54)
DEPRECATED RDW RBC AUTO: 53.1 FL (ref 37–54)
DEPRECATED RDW RBC AUTO: 53.2 FL (ref 37–54)
DEPRECATED RDW RBC AUTO: 54.1 FL (ref 37–54)
DEPRECATED RDW RBC AUTO: 54.9 FL (ref 37–54)
DEPRECATED RDW RBC AUTO: 55 FL (ref 37–54)
DEPRECATED RDW RBC AUTO: 56.6 FL (ref 37–54)
DEPRECATED RDW RBC AUTO: 59 FL (ref 37–54)
DIGITOXIN SERPL-MCNC: <3 NG/ML
DIGOXIN SERPL-MCNC: 0.7 NG/ML (ref 0.6–1.2)
E/E' RATIO: 11
E/E' RATIO: 8
EOSINOPHIL # BLD AUTO: 0.01 10*3/MM3 (ref 0–0.7)
EOSINOPHIL # BLD AUTO: 0.03 10*3/MM3 (ref 0–0.7)
EOSINOPHIL # BLD AUTO: 0.11 10*3/MM3 (ref 0–0.7)
EOSINOPHIL # BLD AUTO: 0.12 10*3/MM3 (ref 0–0.7)
EOSINOPHIL # BLD AUTO: 0.16 10*3/MM3 (ref 0–0.7)
EOSINOPHIL # BLD AUTO: 0.16 10*3/MM3 (ref 0–0.7)
EOSINOPHIL # BLD AUTO: 0.17 10*3/MM3 (ref 0–0.7)
EOSINOPHIL # BLD AUTO: 0.17 10*3/MM3 (ref 0–0.7)
EOSINOPHIL # BLD AUTO: 0.19 10*3/MM3 (ref 0–0.7)
EOSINOPHIL # BLD AUTO: 0.23 10*3/MM3 (ref 0–0.7)
EOSINOPHIL # BLD AUTO: 0.53 10*3/MM3 (ref 0–0.7)
EOSINOPHIL # BLD AUTO: 0.6 10*3/MM3 (ref 0–0.7)
EOSINOPHIL NFR BLD AUTO: 0.1 % (ref 0.3–6.2)
EOSINOPHIL NFR BLD AUTO: 0.3 % (ref 0.3–6.2)
EOSINOPHIL NFR BLD AUTO: 1.2 % (ref 0.3–6.2)
EOSINOPHIL NFR BLD AUTO: 1.4 % (ref 0.3–6.2)
EOSINOPHIL NFR BLD AUTO: 1.9 % (ref 0.3–6.2)
EOSINOPHIL NFR BLD AUTO: 1.9 % (ref 0.3–6.2)
EOSINOPHIL NFR BLD AUTO: 2 % (ref 0.3–6.2)
EOSINOPHIL NFR BLD AUTO: 2 % (ref 0.3–6.2)
EOSINOPHIL NFR BLD AUTO: 2.1 % (ref 0.3–6.2)
EOSINOPHIL NFR BLD AUTO: 2.3 % (ref 0.3–6.2)
EOSINOPHIL NFR BLD AUTO: 6.8 % (ref 0.3–6.2)
EOSINOPHIL NFR BLD AUTO: 6.9 % (ref 0.3–6.2)
ERYTHROCYTE [DISTWIDTH] IN BLOOD BY AUTOMATED COUNT: 12.8 % (ref 11.5–14.5)
ERYTHROCYTE [DISTWIDTH] IN BLOOD BY AUTOMATED COUNT: 12.8 % (ref 11.5–14.5)
ERYTHROCYTE [DISTWIDTH] IN BLOOD BY AUTOMATED COUNT: 12.9 % (ref 11.5–14.5)
ERYTHROCYTE [DISTWIDTH] IN BLOOD BY AUTOMATED COUNT: 12.9 % (ref 11.5–14.5)
ERYTHROCYTE [DISTWIDTH] IN BLOOD BY AUTOMATED COUNT: 13.9 % (ref 11.5–14.5)
ERYTHROCYTE [DISTWIDTH] IN BLOOD BY AUTOMATED COUNT: 14.6 % (ref 11.5–14.5)
ERYTHROCYTE [DISTWIDTH] IN BLOOD BY AUTOMATED COUNT: 14.9 % (ref 11.5–14.5)
ERYTHROCYTE [DISTWIDTH] IN BLOOD BY AUTOMATED COUNT: 15 % (ref 11.5–14.5)
ERYTHROCYTE [DISTWIDTH] IN BLOOD BY AUTOMATED COUNT: 15 % (ref 11.5–14.5)
ERYTHROCYTE [DISTWIDTH] IN BLOOD BY AUTOMATED COUNT: 15.1 % (ref 11.5–14.5)
ERYTHROCYTE [DISTWIDTH] IN BLOOD BY AUTOMATED COUNT: 15.3 % (ref 11.5–14.5)
ERYTHROCYTE [DISTWIDTH] IN BLOOD BY AUTOMATED COUNT: 15.3 % (ref 11.5–14.5)
ERYTHROCYTE [DISTWIDTH] IN BLOOD BY AUTOMATED COUNT: 15.4 % (ref 11.5–14.5)
ERYTHROCYTE [DISTWIDTH] IN BLOOD BY AUTOMATED COUNT: 15.4 % (ref 11.5–14.5)
ERYTHROCYTE [DISTWIDTH] IN BLOOD BY AUTOMATED COUNT: 15.5 % (ref 11.5–14.5)
ERYTHROCYTE [DISTWIDTH] IN BLOOD BY AUTOMATED COUNT: 15.6 % (ref 11.5–14.5)
ERYTHROCYTE [DISTWIDTH] IN BLOOD BY AUTOMATED COUNT: 16.2 % (ref 11.5–14.5)
ERYTHROCYTE [DISTWIDTH] IN BLOOD BY AUTOMATED COUNT: 16.4 % (ref 11.5–14.5)
GFR SERPL CREATININE-BSD FRML MDRD: 101 ML/MIN/1.73
GFR SERPL CREATININE-BSD FRML MDRD: 110 ML/MIN/1.73
GFR SERPL CREATININE-BSD FRML MDRD: 111 ML/MIN/1.73
GFR SERPL CREATININE-BSD FRML MDRD: 114 ML/MIN/1.73
GFR SERPL CREATININE-BSD FRML MDRD: 114 ML/MIN/1.73
GFR SERPL CREATININE-BSD FRML MDRD: 118 ML/MIN/1.73
GFR SERPL CREATININE-BSD FRML MDRD: 118 ML/MIN/1.73
GFR SERPL CREATININE-BSD FRML MDRD: 120 ML/MIN/1.73
GFR SERPL CREATININE-BSD FRML MDRD: 120 ML/MIN/1.73
GFR SERPL CREATININE-BSD FRML MDRD: 122 ML/MIN/1.73
GFR SERPL CREATININE-BSD FRML MDRD: 122 ML/MIN/1.73
GFR SERPL CREATININE-BSD FRML MDRD: 125 ML/MIN/1.73
GFR SERPL CREATININE-BSD FRML MDRD: 129 ML/MIN/1.73
GFR SERPL CREATININE-BSD FRML MDRD: 129 ML/MIN/1.73
GFR SERPL CREATININE-BSD FRML MDRD: 130 ML/MIN/1.73
GFR SERPL CREATININE-BSD FRML MDRD: 134 ML/MIN/1.73
GFR SERPL CREATININE-BSD FRML MDRD: 135 ML/MIN/1.73
GFR SERPL CREATININE-BSD FRML MDRD: 143 ML/MIN/1.73
GFR SERPL CREATININE-BSD FRML MDRD: 146 ML/MIN/1.73
GFR SERPL CREATININE-BSD FRML MDRD: 146 ML/MIN/1.73
GFR SERPL CREATININE-BSD FRML MDRD: 74 ML/MIN/1.73
GFR SERPL CREATININE-BSD FRML MDRD: 95 ML/MIN/1.73
GFR SERPL CREATININE-BSD FRML MDRD: >150 ML/MIN/1.73
GLOBULIN UR ELPH-MCNC: 2.5 GM/DL
GLOBULIN UR ELPH-MCNC: 2.7 GM/DL
GLOBULIN UR ELPH-MCNC: 2.9 GM/DL
GLOBULIN UR ELPH-MCNC: 3.1 GM/DL
GLOBULIN UR ELPH-MCNC: 3.2 GM/DL
GLOBULIN UR ELPH-MCNC: 3.4 GM/DL
GLUCOSE BLD-MCNC: 101 MG/DL (ref 65–99)
GLUCOSE BLD-MCNC: 116 MG/DL (ref 65–99)
GLUCOSE BLD-MCNC: 117 MG/DL (ref 65–99)
GLUCOSE BLD-MCNC: 141 MG/DL (ref 65–99)
GLUCOSE BLD-MCNC: 152 MG/DL (ref 65–99)
GLUCOSE BLD-MCNC: 152 MG/DL (ref 65–99)
GLUCOSE BLD-MCNC: 156 MG/DL (ref 65–99)
GLUCOSE BLD-MCNC: 164 MG/DL (ref 65–99)
GLUCOSE BLD-MCNC: 169 MG/DL (ref 65–99)
GLUCOSE BLD-MCNC: 181 MG/DL (ref 65–99)
GLUCOSE BLD-MCNC: 184 MG/DL (ref 65–99)
GLUCOSE BLD-MCNC: 188 MG/DL (ref 65–99)
GLUCOSE BLD-MCNC: 188 MG/DL (ref 65–99)
GLUCOSE BLD-MCNC: 195 MG/DL (ref 65–99)
GLUCOSE BLD-MCNC: 201 MG/DL (ref 65–99)
GLUCOSE BLD-MCNC: 208 MG/DL (ref 65–99)
GLUCOSE BLD-MCNC: 221 MG/DL (ref 65–99)
GLUCOSE BLD-MCNC: 223 MG/DL (ref 65–99)
GLUCOSE BLD-MCNC: 232 MG/DL (ref 65–99)
GLUCOSE BLD-MCNC: 245 MG/DL (ref 65–99)
GLUCOSE BLD-MCNC: 296 MG/DL (ref 65–99)
GLUCOSE BLD-MCNC: 298 MG/DL (ref 65–99)
GLUCOSE BLD-MCNC: 300 MG/DL (ref 65–99)
GLUCOSE BLDC GLUCOMTR-MCNC: 106 MG/DL (ref 70–130)
GLUCOSE BLDC GLUCOMTR-MCNC: 106 MG/DL (ref 70–130)
GLUCOSE BLDC GLUCOMTR-MCNC: 109 MG/DL (ref 70–130)
GLUCOSE BLDC GLUCOMTR-MCNC: 109 MG/DL (ref 70–130)
GLUCOSE BLDC GLUCOMTR-MCNC: 113 MG/DL (ref 70–130)
GLUCOSE BLDC GLUCOMTR-MCNC: 115 MG/DL (ref 70–130)
GLUCOSE BLDC GLUCOMTR-MCNC: 118 MG/DL (ref 70–130)
GLUCOSE BLDC GLUCOMTR-MCNC: 122 MG/DL (ref 70–130)
GLUCOSE BLDC GLUCOMTR-MCNC: 125 MG/DL (ref 70–130)
GLUCOSE BLDC GLUCOMTR-MCNC: 126 MG/DL (ref 70–130)
GLUCOSE BLDC GLUCOMTR-MCNC: 126 MG/DL (ref 70–130)
GLUCOSE BLDC GLUCOMTR-MCNC: 130 MG/DL (ref 70–130)
GLUCOSE BLDC GLUCOMTR-MCNC: 131 MG/DL (ref 70–130)
GLUCOSE BLDC GLUCOMTR-MCNC: 134 MG/DL (ref 70–130)
GLUCOSE BLDC GLUCOMTR-MCNC: 135 MG/DL (ref 70–130)
GLUCOSE BLDC GLUCOMTR-MCNC: 136 MG/DL (ref 70–130)
GLUCOSE BLDC GLUCOMTR-MCNC: 138 MG/DL (ref 70–130)
GLUCOSE BLDC GLUCOMTR-MCNC: 138 MG/DL (ref 70–130)
GLUCOSE BLDC GLUCOMTR-MCNC: 141 MG/DL (ref 70–130)
GLUCOSE BLDC GLUCOMTR-MCNC: 141 MG/DL (ref 70–130)
GLUCOSE BLDC GLUCOMTR-MCNC: 142 MG/DL (ref 70–130)
GLUCOSE BLDC GLUCOMTR-MCNC: 143 MG/DL (ref 70–130)
GLUCOSE BLDC GLUCOMTR-MCNC: 143 MG/DL (ref 70–130)
GLUCOSE BLDC GLUCOMTR-MCNC: 146 MG/DL (ref 70–130)
GLUCOSE BLDC GLUCOMTR-MCNC: 148 MG/DL (ref 70–130)
GLUCOSE BLDC GLUCOMTR-MCNC: 148 MG/DL (ref 70–130)
GLUCOSE BLDC GLUCOMTR-MCNC: 149 MG/DL (ref 70–130)
GLUCOSE BLDC GLUCOMTR-MCNC: 149 MG/DL (ref 70–130)
GLUCOSE BLDC GLUCOMTR-MCNC: 150 MG/DL (ref 70–130)
GLUCOSE BLDC GLUCOMTR-MCNC: 153 MG/DL (ref 70–130)
GLUCOSE BLDC GLUCOMTR-MCNC: 154 MG/DL (ref 70–130)
GLUCOSE BLDC GLUCOMTR-MCNC: 154 MG/DL (ref 70–130)
GLUCOSE BLDC GLUCOMTR-MCNC: 155 MG/DL (ref 70–130)
GLUCOSE BLDC GLUCOMTR-MCNC: 156 MG/DL (ref 70–130)
GLUCOSE BLDC GLUCOMTR-MCNC: 156 MG/DL (ref 70–130)
GLUCOSE BLDC GLUCOMTR-MCNC: 162 MG/DL (ref 70–130)
GLUCOSE BLDC GLUCOMTR-MCNC: 164 MG/DL (ref 70–130)
GLUCOSE BLDC GLUCOMTR-MCNC: 164 MG/DL (ref 70–130)
GLUCOSE BLDC GLUCOMTR-MCNC: 165 MG/DL (ref 70–130)
GLUCOSE BLDC GLUCOMTR-MCNC: 167 MG/DL (ref 70–130)
GLUCOSE BLDC GLUCOMTR-MCNC: 171 MG/DL (ref 70–130)
GLUCOSE BLDC GLUCOMTR-MCNC: 171 MG/DL (ref 70–130)
GLUCOSE BLDC GLUCOMTR-MCNC: 172 MG/DL (ref 70–130)
GLUCOSE BLDC GLUCOMTR-MCNC: 172 MG/DL (ref 70–130)
GLUCOSE BLDC GLUCOMTR-MCNC: 173 MG/DL (ref 70–130)
GLUCOSE BLDC GLUCOMTR-MCNC: 175 MG/DL (ref 70–130)
GLUCOSE BLDC GLUCOMTR-MCNC: 177 MG/DL (ref 70–130)
GLUCOSE BLDC GLUCOMTR-MCNC: 179 MG/DL (ref 70–130)
GLUCOSE BLDC GLUCOMTR-MCNC: 180 MG/DL (ref 70–130)
GLUCOSE BLDC GLUCOMTR-MCNC: 181 MG/DL (ref 70–130)
GLUCOSE BLDC GLUCOMTR-MCNC: 181 MG/DL (ref 70–130)
GLUCOSE BLDC GLUCOMTR-MCNC: 182 MG/DL (ref 70–130)
GLUCOSE BLDC GLUCOMTR-MCNC: 183 MG/DL (ref 70–130)
GLUCOSE BLDC GLUCOMTR-MCNC: 186 MG/DL (ref 70–130)
GLUCOSE BLDC GLUCOMTR-MCNC: 187 MG/DL (ref 70–130)
GLUCOSE BLDC GLUCOMTR-MCNC: 187 MG/DL (ref 70–130)
GLUCOSE BLDC GLUCOMTR-MCNC: 188 MG/DL (ref 70–130)
GLUCOSE BLDC GLUCOMTR-MCNC: 188 MG/DL (ref 70–130)
GLUCOSE BLDC GLUCOMTR-MCNC: 191 MG/DL (ref 70–130)
GLUCOSE BLDC GLUCOMTR-MCNC: 192 MG/DL (ref 70–130)
GLUCOSE BLDC GLUCOMTR-MCNC: 193 MG/DL (ref 70–130)
GLUCOSE BLDC GLUCOMTR-MCNC: 197 MG/DL (ref 70–130)
GLUCOSE BLDC GLUCOMTR-MCNC: 198 MG/DL (ref 70–130)
GLUCOSE BLDC GLUCOMTR-MCNC: 200 MG/DL (ref 70–130)
GLUCOSE BLDC GLUCOMTR-MCNC: 200 MG/DL (ref 70–130)
GLUCOSE BLDC GLUCOMTR-MCNC: 202 MG/DL (ref 70–130)
GLUCOSE BLDC GLUCOMTR-MCNC: 202 MG/DL (ref 70–130)
GLUCOSE BLDC GLUCOMTR-MCNC: 204 MG/DL (ref 70–130)
GLUCOSE BLDC GLUCOMTR-MCNC: 204 MG/DL (ref 70–130)
GLUCOSE BLDC GLUCOMTR-MCNC: 207 MG/DL (ref 70–130)
GLUCOSE BLDC GLUCOMTR-MCNC: 209 MG/DL (ref 70–130)
GLUCOSE BLDC GLUCOMTR-MCNC: 210 MG/DL (ref 70–130)
GLUCOSE BLDC GLUCOMTR-MCNC: 212 MG/DL (ref 70–130)
GLUCOSE BLDC GLUCOMTR-MCNC: 214 MG/DL (ref 70–130)
GLUCOSE BLDC GLUCOMTR-MCNC: 216 MG/DL (ref 70–130)
GLUCOSE BLDC GLUCOMTR-MCNC: 216 MG/DL (ref 70–130)
GLUCOSE BLDC GLUCOMTR-MCNC: 217 MG/DL (ref 70–130)
GLUCOSE BLDC GLUCOMTR-MCNC: 220 MG/DL (ref 70–130)
GLUCOSE BLDC GLUCOMTR-MCNC: 222 MG/DL (ref 70–130)
GLUCOSE BLDC GLUCOMTR-MCNC: 223 MG/DL (ref 70–130)
GLUCOSE BLDC GLUCOMTR-MCNC: 224 MG/DL (ref 70–130)
GLUCOSE BLDC GLUCOMTR-MCNC: 231 MG/DL (ref 70–130)
GLUCOSE BLDC GLUCOMTR-MCNC: 232 MG/DL (ref 70–130)
GLUCOSE BLDC GLUCOMTR-MCNC: 234 MG/DL (ref 70–130)
GLUCOSE BLDC GLUCOMTR-MCNC: 235 MG/DL (ref 70–130)
GLUCOSE BLDC GLUCOMTR-MCNC: 239 MG/DL (ref 70–130)
GLUCOSE BLDC GLUCOMTR-MCNC: 242 MG/DL (ref 70–130)
GLUCOSE BLDC GLUCOMTR-MCNC: 243 MG/DL (ref 70–130)
GLUCOSE BLDC GLUCOMTR-MCNC: 243 MG/DL (ref 70–130)
GLUCOSE BLDC GLUCOMTR-MCNC: 248 MG/DL (ref 70–130)
GLUCOSE BLDC GLUCOMTR-MCNC: 248 MG/DL (ref 70–130)
GLUCOSE BLDC GLUCOMTR-MCNC: 251 MG/DL (ref 70–130)
GLUCOSE BLDC GLUCOMTR-MCNC: 255 MG/DL (ref 70–130)
GLUCOSE BLDC GLUCOMTR-MCNC: 256 MG/DL (ref 70–130)
GLUCOSE BLDC GLUCOMTR-MCNC: 256 MG/DL (ref 70–130)
GLUCOSE BLDC GLUCOMTR-MCNC: 257 MG/DL (ref 70–130)
GLUCOSE BLDC GLUCOMTR-MCNC: 259 MG/DL (ref 70–130)
GLUCOSE BLDC GLUCOMTR-MCNC: 259 MG/DL (ref 70–130)
GLUCOSE BLDC GLUCOMTR-MCNC: 262 MG/DL (ref 70–130)
GLUCOSE BLDC GLUCOMTR-MCNC: 264 MG/DL (ref 70–130)
GLUCOSE BLDC GLUCOMTR-MCNC: 264 MG/DL (ref 70–130)
GLUCOSE BLDC GLUCOMTR-MCNC: 268 MG/DL (ref 70–130)
GLUCOSE BLDC GLUCOMTR-MCNC: 273 MG/DL (ref 70–130)
GLUCOSE BLDC GLUCOMTR-MCNC: 274 MG/DL (ref 70–130)
GLUCOSE BLDC GLUCOMTR-MCNC: 274 MG/DL (ref 70–130)
GLUCOSE BLDC GLUCOMTR-MCNC: 297 MG/DL (ref 70–130)
GLUCOSE BLDC GLUCOMTR-MCNC: 299 MG/DL (ref 70–130)
GLUCOSE BLDC GLUCOMTR-MCNC: 309 MG/DL (ref 70–130)
GLUCOSE BLDC GLUCOMTR-MCNC: 67 MG/DL (ref 70–130)
GLUCOSE BLDC GLUCOMTR-MCNC: 71 MG/DL (ref 70–130)
GLUCOSE BLDC GLUCOMTR-MCNC: 86 MG/DL (ref 70–130)
GLUCOSE UR STRIP-MCNC: NEGATIVE MG/DL
HBA1C MFR BLD: 6.6 % (ref 4.8–5.6)
HBA1C MFR BLD: 7.97 % (ref 4.8–5.6)
HCO3 BLDA-SCNC: 25.6 MMOL/L (ref 22–28)
HCT VFR BLD AUTO: 23.1 % (ref 40.4–52.2)
HCT VFR BLD AUTO: 23.9 % (ref 40.4–52.2)
HCT VFR BLD AUTO: 25 % (ref 40.4–52.2)
HCT VFR BLD AUTO: 25.9 % (ref 40.4–52.2)
HCT VFR BLD AUTO: 29.8 % (ref 40.4–52.2)
HCT VFR BLD AUTO: 30.6 % (ref 40.4–52.2)
HCT VFR BLD AUTO: 33.1 % (ref 40.4–52.2)
HCT VFR BLD AUTO: 33.6 % (ref 40.4–52.2)
HCT VFR BLD AUTO: 34.3 % (ref 40.4–52.2)
HCT VFR BLD AUTO: 34.7 % (ref 40.4–52.2)
HCT VFR BLD AUTO: 34.7 % (ref 40.4–52.2)
HCT VFR BLD AUTO: 35.2 % (ref 40.4–52.2)
HCT VFR BLD AUTO: 35.2 % (ref 40.4–52.2)
HCT VFR BLD AUTO: 35.3 % (ref 40.4–52.2)
HCT VFR BLD AUTO: 36.4 % (ref 40.4–52.2)
HCT VFR BLD AUTO: 37.1 % (ref 40.4–52.2)
HCT VFR BLD AUTO: 38.3 % (ref 40.4–52.2)
HCT VFR BLD AUTO: 38.4 % (ref 40.4–52.2)
HDLC SERPL-MCNC: 29 MG/DL (ref 40–60)
HGB BLD-MCNC: 10.4 G/DL (ref 13.7–17.6)
HGB BLD-MCNC: 10.7 G/DL (ref 13.7–17.6)
HGB BLD-MCNC: 11 G/DL (ref 13.7–17.6)
HGB BLD-MCNC: 11 G/DL (ref 13.7–17.6)
HGB BLD-MCNC: 11.1 G/DL (ref 13.7–17.6)
HGB BLD-MCNC: 11.4 G/DL (ref 13.7–17.6)
HGB BLD-MCNC: 11.7 G/DL (ref 13.7–17.6)
HGB BLD-MCNC: 12.4 G/DL (ref 13.7–17.6)
HGB BLD-MCNC: 13 G/DL (ref 13.7–17.6)
HGB BLD-MCNC: 13.3 G/DL (ref 13.7–17.6)
HGB BLD-MCNC: 7.8 G/DL (ref 13.7–17.6)
HGB BLD-MCNC: 8.3 G/DL (ref 13.7–17.6)
HGB BLD-MCNC: 8.6 G/DL (ref 13.7–17.6)
HGB BLD-MCNC: 9 G/DL (ref 13.7–17.6)
HGB BLD-MCNC: 9.7 G/DL (ref 13.7–17.6)
HGB BLD-MCNC: 9.9 G/DL (ref 13.7–17.6)
HGB UR QL STRIP.AUTO: ABNORMAL
HOLD SPECIMEN: NORMAL
HOROWITZ INDEX BLD+IHG-RTO: 21 %
HYALINE CASTS UR QL AUTO: ABNORMAL /LPF
IMM GRANULOCYTES # BLD: 0 10*3/MM3 (ref 0–0.03)
IMM GRANULOCYTES # BLD: 0.02 10*3/MM3 (ref 0–0.03)
IMM GRANULOCYTES # BLD: 0.03 10*3/MM3 (ref 0–0.03)
IMM GRANULOCYTES # BLD: 0.05 10*3/MM3 (ref 0–0.03)
IMM GRANULOCYTES NFR BLD: 0 % (ref 0–0.5)
IMM GRANULOCYTES NFR BLD: 0.2 % (ref 0–0.5)
IMM GRANULOCYTES NFR BLD: 0.3 % (ref 0–0.5)
IMM GRANULOCYTES NFR BLD: 0.4 % (ref 0–0.5)
IMM GRANULOCYTES NFR BLD: 0.6 % (ref 0–0.5)
INR PPP: 1.11 (ref 0.9–1.1)
INR PPP: 1.13 (ref 0.9–1.1)
INR PPP: 1.13 (ref 0.9–1.1)
INR PPP: 1.18 (ref 0.9–1.1)
INR PPP: 1.2 (ref 0.9–1.1)
INR PPP: 1.22 (ref 0.9–1.1)
INR PPP: 1.27 (ref 0.9–1.1)
INR PPP: 1.46 (ref 0.9–1.1)
INR PPP: 1.48 (ref 0.9–1.1)
INR PPP: 2.31 (ref 0.9–1.1)
INR PPP: 2.49 (ref 0.9–1.1)
INR PPP: 2.71 (ref 0.9–1.1)
INR PPP: 2.96 (ref 0.9–1.1)
INR PPP: 3.1 (ref 0.9–1.1)
INR PPP: >10 (ref 0.9–1.1)
KETONES UR QL STRIP: ABNORMAL
KETONES UR QL STRIP: ABNORMAL
KETONES UR QL STRIP: NEGATIVE
LDLC SERPL CALC-MCNC: 54 MG/DL (ref 0–100)
LDLC/HDLC SERPL: 1.87 {RATIO}
LEFT ARM BP: NORMAL MMHG
LEFT ATRIUM VOLUME INDEX: 21 ML/M2
LEFT ATRIUM VOLUME INDEX: 68 ML/M2
LEUKOCYTE ESTERASE UR QL STRIP.AUTO: ABNORMAL
LEUKOCYTE ESTERASE UR QL STRIP.AUTO: ABNORMAL
LEUKOCYTE ESTERASE UR QL STRIP.AUTO: NEGATIVE
LV EF 2D ECHO EST: 57 %
LYMPHOCYTES # BLD AUTO: 0.75 10*3/MM3 (ref 0.9–4.8)
LYMPHOCYTES # BLD AUTO: 1.01 10*3/MM3 (ref 0.9–4.8)
LYMPHOCYTES # BLD AUTO: 1.04 10*3/MM3 (ref 0.9–4.8)
LYMPHOCYTES # BLD AUTO: 1.07 10*3/MM3 (ref 0.9–4.8)
LYMPHOCYTES # BLD AUTO: 1.15 10*3/MM3 (ref 0.9–4.8)
LYMPHOCYTES # BLD AUTO: 1.18 10*3/MM3 (ref 0.9–4.8)
LYMPHOCYTES # BLD AUTO: 1.31 10*3/MM3 (ref 0.9–4.8)
LYMPHOCYTES # BLD AUTO: 1.36 10*3/MM3 (ref 0.9–4.8)
LYMPHOCYTES # BLD AUTO: 1.42 10*3/MM3 (ref 0.9–4.8)
LYMPHOCYTES # BLD AUTO: 1.45 10*3/MM3 (ref 0.9–4.8)
LYMPHOCYTES # BLD AUTO: 1.46 10*3/MM3 (ref 0.9–4.8)
LYMPHOCYTES # BLD AUTO: 1.8 10*3/MM3 (ref 0.9–4.8)
LYMPHOCYTES NFR BLD AUTO: 14.4 % (ref 19.6–45.3)
LYMPHOCYTES NFR BLD AUTO: 15.1 % (ref 19.6–45.3)
LYMPHOCYTES NFR BLD AUTO: 15.2 % (ref 19.6–45.3)
LYMPHOCYTES NFR BLD AUTO: 15.8 % (ref 19.6–45.3)
LYMPHOCYTES NFR BLD AUTO: 16.2 % (ref 19.6–45.3)
LYMPHOCYTES NFR BLD AUTO: 16.5 % (ref 19.6–45.3)
LYMPHOCYTES NFR BLD AUTO: 18.2 % (ref 19.6–45.3)
LYMPHOCYTES NFR BLD AUTO: 18.5 % (ref 19.6–45.3)
LYMPHOCYTES NFR BLD AUTO: 8.5 % (ref 19.6–45.3)
LYMPHOCYTES NFR BLD AUTO: 8.9 % (ref 19.6–45.3)
LYMPHOCYTES NFR BLD AUTO: 9.6 % (ref 19.6–45.3)
LYMPHOCYTES NFR BLD AUTO: 9.9 % (ref 19.6–45.3)
MAGNESIUM SERPL-MCNC: 1.2 MG/DL (ref 1.6–2.4)
MAGNESIUM SERPL-MCNC: 1.4 MG/DL (ref 1.6–2.4)
MAGNESIUM SERPL-MCNC: 1.6 MG/DL (ref 1.6–2.4)
MAGNESIUM SERPL-MCNC: 1.7 MG/DL (ref 1.6–2.4)
MAGNESIUM SERPL-MCNC: 1.8 MG/DL (ref 1.6–2.4)
MCH RBC QN AUTO: 30 PG (ref 27–32.7)
MCH RBC QN AUTO: 30.1 PG (ref 27–32.7)
MCH RBC QN AUTO: 30.2 PG (ref 27–32.7)
MCH RBC QN AUTO: 30.2 PG (ref 27–32.7)
MCH RBC QN AUTO: 30.5 PG (ref 27–32.7)
MCH RBC QN AUTO: 30.6 PG (ref 27–32.7)
MCH RBC QN AUTO: 30.7 PG (ref 27–32.7)
MCH RBC QN AUTO: 30.7 PG (ref 27–32.7)
MCH RBC QN AUTO: 30.8 PG (ref 27–32.7)
MCH RBC QN AUTO: 30.8 PG (ref 27–32.7)
MCH RBC QN AUTO: 30.9 PG (ref 27–32.7)
MCH RBC QN AUTO: 31.3 PG (ref 27–32.7)
MCH RBC QN AUTO: 31.3 PG (ref 27–32.7)
MCH RBC QN AUTO: 31.6 PG (ref 27–32.7)
MCH RBC QN AUTO: 31.6 PG (ref 27–32.7)
MCH RBC QN AUTO: 31.7 PG (ref 27–32.7)
MCH RBC QN AUTO: 31.8 PG (ref 27–32.7)
MCH RBC QN AUTO: 32.3 PG (ref 27–32.7)
MCHC RBC AUTO-ENTMCNC: 30.8 G/DL (ref 32.6–36.4)
MCHC RBC AUTO-ENTMCNC: 31.2 G/DL (ref 32.6–36.4)
MCHC RBC AUTO-ENTMCNC: 31.3 G/DL (ref 32.6–36.4)
MCHC RBC AUTO-ENTMCNC: 31.4 G/DL (ref 32.6–36.4)
MCHC RBC AUTO-ENTMCNC: 31.5 G/DL (ref 32.6–36.4)
MCHC RBC AUTO-ENTMCNC: 31.5 G/DL (ref 32.6–36.4)
MCHC RBC AUTO-ENTMCNC: 32 G/DL (ref 32.6–36.4)
MCHC RBC AUTO-ENTMCNC: 32.4 G/DL (ref 32.6–36.4)
MCHC RBC AUTO-ENTMCNC: 32.4 G/DL (ref 32.6–36.4)
MCHC RBC AUTO-ENTMCNC: 32.6 G/DL (ref 32.6–36.4)
MCHC RBC AUTO-ENTMCNC: 33.8 G/DL (ref 32.6–36.4)
MCHC RBC AUTO-ENTMCNC: 33.9 G/DL (ref 32.6–36.4)
MCHC RBC AUTO-ENTMCNC: 33.9 G/DL (ref 32.6–36.4)
MCHC RBC AUTO-ENTMCNC: 34.1 G/DL (ref 32.6–36.4)
MCHC RBC AUTO-ENTMCNC: 34.4 G/DL (ref 32.6–36.4)
MCHC RBC AUTO-ENTMCNC: 34.6 G/DL (ref 32.6–36.4)
MCHC RBC AUTO-ENTMCNC: 34.7 G/DL (ref 32.6–36.4)
MCHC RBC AUTO-ENTMCNC: 34.7 G/DL (ref 32.6–36.4)
MCV RBC AUTO: 89 FL (ref 79.8–96.2)
MCV RBC AUTO: 90.2 FL (ref 79.8–96.2)
MCV RBC AUTO: 90.9 FL (ref 79.8–96.2)
MCV RBC AUTO: 91.9 FL (ref 79.8–96.2)
MCV RBC AUTO: 92.6 FL (ref 79.8–96.2)
MCV RBC AUTO: 93.3 FL (ref 79.8–96.2)
MCV RBC AUTO: 93.5 FL (ref 79.8–96.2)
MCV RBC AUTO: 94.5 FL (ref 79.8–96.2)
MCV RBC AUTO: 94.6 FL (ref 79.8–96.2)
MCV RBC AUTO: 94.7 FL (ref 79.8–96.2)
MCV RBC AUTO: 95 FL (ref 79.8–96.2)
MCV RBC AUTO: 95.9 FL (ref 79.8–96.2)
MCV RBC AUTO: 95.9 FL (ref 79.8–96.2)
MCV RBC AUTO: 96.2 FL (ref 79.8–96.2)
MCV RBC AUTO: 96.2 FL (ref 79.8–96.2)
MCV RBC AUTO: 96.7 FL (ref 79.8–96.2)
MCV RBC AUTO: 97.7 FL (ref 79.8–96.2)
MCV RBC AUTO: 98.6 FL (ref 79.8–96.2)
MODALITY: ABNORMAL
MONOCYTES # BLD AUTO: 0.44 10*3/MM3 (ref 0.2–1.2)
MONOCYTES # BLD AUTO: 0.49 10*3/MM3 (ref 0.2–1.2)
MONOCYTES # BLD AUTO: 0.51 10*3/MM3 (ref 0.2–1.2)
MONOCYTES # BLD AUTO: 0.64 10*3/MM3 (ref 0.2–1.2)
MONOCYTES # BLD AUTO: 0.65 10*3/MM3 (ref 0.2–1.2)
MONOCYTES # BLD AUTO: 0.68 10*3/MM3 (ref 0.2–1.2)
MONOCYTES # BLD AUTO: 0.68 10*3/MM3 (ref 0.2–1.2)
MONOCYTES # BLD AUTO: 0.71 10*3/MM3 (ref 0.2–1.2)
MONOCYTES # BLD AUTO: 0.75 10*3/MM3 (ref 0.2–1.2)
MONOCYTES # BLD AUTO: 0.76 10*3/MM3 (ref 0.2–1.2)
MONOCYTES # BLD AUTO: 0.81 10*3/MM3 (ref 0.2–1.2)
MONOCYTES # BLD AUTO: 1.06 10*3/MM3 (ref 0.2–1.2)
MONOCYTES NFR BLD AUTO: 6.2 % (ref 5–12)
MONOCYTES NFR BLD AUTO: 6.3 % (ref 5–12)
MONOCYTES NFR BLD AUTO: 6.4 % (ref 5–12)
MONOCYTES NFR BLD AUTO: 6.5 % (ref 5–12)
MONOCYTES NFR BLD AUTO: 6.6 % (ref 5–12)
MONOCYTES NFR BLD AUTO: 7.3 % (ref 5–12)
MONOCYTES NFR BLD AUTO: 7.4 % (ref 5–12)
MONOCYTES NFR BLD AUTO: 7.9 % (ref 5–12)
MONOCYTES NFR BLD AUTO: 8 % (ref 5–12)
MONOCYTES NFR BLD AUTO: 8.3 % (ref 5–12)
MONOCYTES NFR BLD AUTO: 8.3 % (ref 5–12)
MONOCYTES NFR BLD AUTO: 8.8 % (ref 5–12)
NEUTROPHILS # BLD AUTO: 5.31 10*3/MM3 (ref 1.9–8.1)
NEUTROPHILS # BLD AUTO: 5.54 10*3/MM3 (ref 1.9–8.1)
NEUTROPHILS # BLD AUTO: 5.79 10*3/MM3 (ref 1.9–8.1)
NEUTROPHILS # BLD AUTO: 5.99 10*3/MM3 (ref 1.9–8.1)
NEUTROPHILS # BLD AUTO: 6.39 10*3/MM3 (ref 1.9–8.1)
NEUTROPHILS # BLD AUTO: 6.48 10*3/MM3 (ref 1.9–8.1)
NEUTROPHILS # BLD AUTO: 6.9 10*3/MM3 (ref 1.9–8.1)
NEUTROPHILS # BLD AUTO: 6.92 10*3/MM3 (ref 1.9–8.1)
NEUTROPHILS # BLD AUTO: 7.2 10*3/MM3 (ref 1.9–8.1)
NEUTROPHILS # BLD AUTO: 8.45 10*3/MM3 (ref 1.9–8.1)
NEUTROPHILS # BLD AUTO: 9.57 10*3/MM3 (ref 1.9–8.1)
NEUTROPHILS # BLD AUTO: 9.75 10*3/MM3 (ref 1.9–8.1)
NEUTROPHILS NFR BLD AUTO: 68.6 % (ref 42.7–76)
NEUTROPHILS NFR BLD AUTO: 70.8 % (ref 42.7–76)
NEUTROPHILS NFR BLD AUTO: 71 % (ref 42.7–76)
NEUTROPHILS NFR BLD AUTO: 72.8 % (ref 42.7–76)
NEUTROPHILS NFR BLD AUTO: 73.2 % (ref 42.7–76)
NEUTROPHILS NFR BLD AUTO: 74.3 % (ref 42.7–76)
NEUTROPHILS NFR BLD AUTO: 75.6 % (ref 42.7–76)
NEUTROPHILS NFR BLD AUTO: 75.8 % (ref 42.7–76)
NEUTROPHILS NFR BLD AUTO: 81.1 % (ref 42.7–76)
NEUTROPHILS NFR BLD AUTO: 82 % (ref 42.7–76)
NEUTROPHILS NFR BLD AUTO: 82.4 % (ref 42.7–76)
NEUTROPHILS NFR BLD AUTO: 82.6 % (ref 42.7–76)
NITRITE UR QL STRIP: NEGATIVE
O2 A-A PPRESDIFF RESPIRATORY: 0.6 MMHG
PCO2 BLDA: 37.8 MM HG (ref 35–45)
PH BLDA: 7.44 PH UNITS (ref 7.35–7.45)
PH UR STRIP.AUTO: 6 [PH] (ref 5–8)
PHOSPHATE SERPL-MCNC: 1.8 MG/DL (ref 2.5–4.5)
PLATELET # BLD AUTO: 121 10*3/MM3 (ref 140–500)
PLATELET # BLD AUTO: 125 10*3/MM3 (ref 140–500)
PLATELET # BLD AUTO: 128 10*3/MM3 (ref 140–500)
PLATELET # BLD AUTO: 130 10*3/MM3 (ref 140–500)
PLATELET # BLD AUTO: 134 10*3/MM3 (ref 140–500)
PLATELET # BLD AUTO: 142 10*3/MM3 (ref 140–500)
PLATELET # BLD AUTO: 167 10*3/MM3 (ref 140–500)
PLATELET # BLD AUTO: 168 10*3/MM3 (ref 140–500)
PLATELET # BLD AUTO: 169 10*3/MM3 (ref 140–500)
PLATELET # BLD AUTO: 171 10*3/MM3 (ref 140–500)
PLATELET # BLD AUTO: 173 10*3/MM3 (ref 140–500)
PLATELET # BLD AUTO: 177 10*3/MM3 (ref 140–500)
PLATELET # BLD AUTO: 203 10*3/MM3 (ref 140–500)
PLATELET # BLD AUTO: 223 10*3/MM3 (ref 140–500)
PLATELET # BLD AUTO: 247 10*3/MM3 (ref 140–500)
PLATELET # BLD AUTO: 265 10*3/MM3 (ref 140–500)
PLATELET # BLD AUTO: 267 10*3/MM3 (ref 140–500)
PLATELET # BLD AUTO: 278 10*3/MM3 (ref 140–500)
PMV BLD AUTO: 10 FL (ref 6–12)
PMV BLD AUTO: 10.1 FL (ref 6–12)
PMV BLD AUTO: 10.5 FL (ref 6–12)
PMV BLD AUTO: 11.9 FL (ref 6–12)
PMV BLD AUTO: 8.4 FL (ref 6–12)
PMV BLD AUTO: 8.9 FL (ref 6–12)
PMV BLD AUTO: 8.9 FL (ref 6–12)
PMV BLD AUTO: 9 FL (ref 6–12)
PMV BLD AUTO: 9.3 FL (ref 6–12)
PMV BLD AUTO: 9.4 FL (ref 6–12)
PMV BLD AUTO: 9.4 FL (ref 6–12)
PMV BLD AUTO: 9.5 FL (ref 6–12)
PMV BLD AUTO: 9.6 FL (ref 6–12)
PMV BLD AUTO: 9.7 FL (ref 6–12)
PMV BLD AUTO: 9.7 FL (ref 6–12)
PMV BLD AUTO: 9.8 FL (ref 6–12)
PO2 BLDA: 67.7 MM HG (ref 80–100)
POTASSIUM BLD-SCNC: 2.9 MMOL/L (ref 3.5–5.2)
POTASSIUM BLD-SCNC: 2.9 MMOL/L (ref 3.5–5.2)
POTASSIUM BLD-SCNC: 3 MMOL/L (ref 3.5–5.2)
POTASSIUM BLD-SCNC: 3.1 MMOL/L (ref 3.5–5.2)
POTASSIUM BLD-SCNC: 3.2 MMOL/L (ref 3.5–5.2)
POTASSIUM BLD-SCNC: 3.2 MMOL/L (ref 3.5–5.2)
POTASSIUM BLD-SCNC: 3.3 MMOL/L (ref 3.5–5.2)
POTASSIUM BLD-SCNC: 3.4 MMOL/L (ref 3.5–5.2)
POTASSIUM BLD-SCNC: 3.4 MMOL/L (ref 3.5–5.2)
POTASSIUM BLD-SCNC: 3.5 MMOL/L (ref 3.5–5.2)
POTASSIUM BLD-SCNC: 3.5 MMOL/L (ref 3.5–5.2)
POTASSIUM BLD-SCNC: 3.6 MMOL/L (ref 3.5–5.2)
POTASSIUM BLD-SCNC: 3.7 MMOL/L (ref 3.5–5.2)
POTASSIUM BLD-SCNC: 3.8 MMOL/L (ref 3.5–5.2)
POTASSIUM BLD-SCNC: 3.9 MMOL/L (ref 3.5–5.2)
POTASSIUM BLD-SCNC: 3.9 MMOL/L (ref 3.5–5.2)
POTASSIUM BLD-SCNC: 4 MMOL/L (ref 3.5–5.2)
POTASSIUM BLD-SCNC: 4 MMOL/L (ref 3.5–5.2)
POTASSIUM BLD-SCNC: 4.1 MMOL/L (ref 3.5–5.2)
POTASSIUM BLD-SCNC: 4.2 MMOL/L (ref 3.5–5.2)
POTASSIUM BLD-SCNC: 4.4 MMOL/L (ref 3.5–5.2)
POTASSIUM BLD-SCNC: 4.7 MMOL/L (ref 3.5–5.2)
PROCALCITONIN SERPL-MCNC: 0.1 NG/ML (ref 0.1–0.25)
PROT SERPL-MCNC: 5.2 G/DL (ref 6–8.5)
PROT SERPL-MCNC: 5.8 G/DL (ref 6–8.5)
PROT SERPL-MCNC: 5.9 G/DL (ref 6–8.5)
PROT SERPL-MCNC: 6.1 G/DL (ref 6–8.5)
PROT SERPL-MCNC: 6.3 G/DL (ref 6–8.5)
PROT SERPL-MCNC: 6.7 G/DL (ref 6–8.5)
PROT UR QL STRIP: ABNORMAL
PROTHROMBIN TIME: 13.9 SECONDS (ref 11.7–14.2)
PROTHROMBIN TIME: 14.1 SECONDS (ref 11.7–14.2)
PROTHROMBIN TIME: 14.1 SECONDS (ref 11.7–14.2)
PROTHROMBIN TIME: 14.5 SECONDS (ref 11.7–14.2)
PROTHROMBIN TIME: 14.8 SECONDS (ref 11.7–14.2)
PROTHROMBIN TIME: 14.9 SECONDS (ref 11.7–14.2)
PROTHROMBIN TIME: 15.5 SECONDS (ref 11.7–14.2)
PROTHROMBIN TIME: 17.2 SECONDS (ref 11.7–14.2)
PROTHROMBIN TIME: 17.4 SECONDS (ref 11.7–14.2)
PROTHROMBIN TIME: 24.6 SECONDS (ref 11.7–14.2)
PROTHROMBIN TIME: 26.1 SECONDS (ref 11.7–14.2)
PROTHROMBIN TIME: 27.9 SECONDS (ref 11.7–14.2)
PROTHROMBIN TIME: 29.9 SECONDS (ref 11.7–14.2)
PROTHROMBIN TIME: 31 SECONDS (ref 11.7–14.2)
PROTHROMBIN TIME: >100 SECONDS (ref 11.7–14.2)
RBC # BLD AUTO: 2.47 10*6/MM3 (ref 4.6–6)
RBC # BLD AUTO: 2.65 10*6/MM3 (ref 4.6–6)
RBC # BLD AUTO: 2.75 10*6/MM3 (ref 4.6–6)
RBC # BLD AUTO: 2.91 10*6/MM3 (ref 4.6–6)
RBC # BLD AUTO: 3.15 10*6/MM3 (ref 4.6–6)
RBC # BLD AUTO: 3.23 10*6/MM3 (ref 4.6–6)
RBC # BLD AUTO: 3.44 10*6/MM3 (ref 4.6–6)
RBC # BLD AUTO: 3.55 10*6/MM3 (ref 4.6–6)
RBC # BLD AUTO: 3.57 10*6/MM3 (ref 4.6–6)
RBC # BLD AUTO: 3.6 10*6/MM3 (ref 4.6–6)
RBC # BLD AUTO: 3.62 10*6/MM3 (ref 4.6–6)
RBC # BLD AUTO: 3.63 10*6/MM3 (ref 4.6–6)
RBC # BLD AUTO: 3.64 10*6/MM3 (ref 4.6–6)
RBC # BLD AUTO: 3.67 10*6/MM3 (ref 4.6–6)
RBC # BLD AUTO: 3.87 10*6/MM3 (ref 4.6–6)
RBC # BLD AUTO: 3.93 10*6/MM3 (ref 4.6–6)
RBC # BLD AUTO: 4.03 10*6/MM3 (ref 4.6–6)
RBC # BLD AUTO: 4.18 10*6/MM3 (ref 4.6–6)
RBC # UR: ABNORMAL /HPF
REF LAB TEST METHOD: ABNORMAL
RH BLD: POSITIVE
RH BLD: POSITIVE
RIGHT ARM BP: NORMAL MMHG
SAO2 % BLDCOA: 93.9 % (ref 92–99)
SET MECH RESP RATE: 28
SODIUM BLD-SCNC: 137 MMOL/L (ref 136–145)
SODIUM BLD-SCNC: 137 MMOL/L (ref 136–145)
SODIUM BLD-SCNC: 138 MMOL/L (ref 136–145)
SODIUM BLD-SCNC: 139 MMOL/L (ref 136–145)
SODIUM BLD-SCNC: 140 MMOL/L (ref 136–145)
SODIUM BLD-SCNC: 140 MMOL/L (ref 136–145)
SODIUM BLD-SCNC: 141 MMOL/L (ref 136–145)
SODIUM BLD-SCNC: 142 MMOL/L (ref 136–145)
SODIUM BLD-SCNC: 143 MMOL/L (ref 136–145)
SODIUM BLD-SCNC: 143 MMOL/L (ref 136–145)
SODIUM BLD-SCNC: 144 MMOL/L (ref 136–145)
SODIUM BLD-SCNC: 148 MMOL/L (ref 136–145)
SODIUM BLD-SCNC: 149 MMOL/L (ref 136–145)
SODIUM BLD-SCNC: 150 MMOL/L (ref 136–145)
SP GR UR STRIP: 1.01 (ref 1–1.03)
SP GR UR STRIP: 1.01 (ref 1–1.03)
SP GR UR STRIP: >=1.03 (ref 1–1.03)
SQUAMOUS #/AREA URNS HPF: ABNORMAL /HPF
TRIGL SERPL-MCNC: 99 MG/DL (ref 0–150)
TROPONIN T SERPL-MCNC: 0.02 NG/ML (ref 0–0.03)
TROPONIN T SERPL-MCNC: 0.03 NG/ML (ref 0–0.03)
TROPONIN T SERPL-MCNC: 0.03 NG/ML (ref 0–0.03)
TROPONIN T SERPL-MCNC: 0.09 NG/ML (ref 0–0.03)
TROPONIN T SERPL-MCNC: 0.09 NG/ML (ref 0–0.03)
TROPONIN T SERPL-MCNC: 0.11 NG/ML (ref 0–0.03)
TROPONIN T SERPL-MCNC: 0.14 NG/ML (ref 0–0.03)
UNIT  ABO: NORMAL
UNIT  ABO: NORMAL
UNIT  RH: NORMAL
UNIT  RH: NORMAL
UROBILINOGEN UR QL STRIP: ABNORMAL
VALPROATE SERPL-MCNC: 46 MCG/ML (ref 50–125)
VLDLC SERPL-MCNC: 19.8 MG/DL (ref 5–40)
WBC NRBC COR # BLD: 10.23 10*3/MM3 (ref 4.5–10.7)
WBC NRBC COR # BLD: 11.63 10*3/MM3 (ref 4.5–10.7)
WBC NRBC COR # BLD: 12.02 10*3/MM3 (ref 4.5–10.7)
WBC NRBC COR # BLD: 6.49 10*3/MM3 (ref 4.5–10.7)
WBC NRBC COR # BLD: 7.02 10*3/MM3 (ref 4.5–10.7)
WBC NRBC COR # BLD: 7.82 10*3/MM3 (ref 4.5–10.7)
WBC NRBC COR # BLD: 7.88 10*3/MM3 (ref 4.5–10.7)
WBC NRBC COR # BLD: 7.95 10*3/MM3 (ref 4.5–10.7)
WBC NRBC COR # BLD: 8.12 10*3/MM3 (ref 4.5–10.7)
WBC NRBC COR # BLD: 8.6 10*3/MM3 (ref 4.5–10.7)
WBC NRBC COR # BLD: 8.74 10*3/MM3 (ref 4.5–10.7)
WBC NRBC COR # BLD: 8.78 10*3/MM3 (ref 4.5–10.7)
WBC NRBC COR # BLD: 8.86 10*3/MM3 (ref 4.5–10.7)
WBC NRBC COR # BLD: 9.11 10*3/MM3 (ref 4.5–10.7)
WBC NRBC COR # BLD: 9.14 10*3/MM3 (ref 4.5–10.7)
WBC NRBC COR # BLD: 9.68 10*3/MM3 (ref 4.5–10.7)
WBC NRBC COR # BLD: 9.74 10*3/MM3 (ref 4.5–10.7)
WBC NRBC COR # BLD: 9.9 10*3/MM3 (ref 4.5–10.7)
WBC UR QL AUTO: ABNORMAL /HPF
WHOLE BLOOD HOLD SPECIMEN: NORMAL

## 2017-01-01 PROCEDURE — 82962 GLUCOSE BLOOD TEST: CPT

## 2017-01-01 PROCEDURE — 25810000003 DEXTROSE-NACL PER 500 ML: Performed by: INTERNAL MEDICINE

## 2017-01-01 PROCEDURE — 80048 BASIC METABOLIC PNL TOTAL CA: CPT | Performed by: INTERNAL MEDICINE

## 2017-01-01 PROCEDURE — 63710000001 INSULIN ASPART PER 5 UNITS: Performed by: HOSPITALIST

## 2017-01-01 PROCEDURE — 25010000002 AMPICILLIN SODIUM: Performed by: HOSPITALIST

## 2017-01-01 PROCEDURE — 85610 PROTHROMBIN TIME: CPT | Performed by: INTERNAL MEDICINE

## 2017-01-01 PROCEDURE — 99285 EMERGENCY DEPT VISIT HI MDM: CPT

## 2017-01-01 PROCEDURE — 99233 SBSQ HOSP IP/OBS HIGH 50: CPT | Performed by: PSYCHIATRY & NEUROLOGY

## 2017-01-01 PROCEDURE — 0 GADOBENATE DIMEGLUMINE 529 MG/ML SOLUTION: Performed by: INTERNAL MEDICINE

## 2017-01-01 PROCEDURE — 84100 ASSAY OF PHOSPHORUS: CPT | Performed by: HOSPITALIST

## 2017-01-01 PROCEDURE — 25010000002 LEVETIRACETAM IN NACL 0.82% 500 MG/100ML SOLUTION: Performed by: PSYCHIATRY & NEUROLOGY

## 2017-01-01 PROCEDURE — 83605 ASSAY OF LACTIC ACID: CPT | Performed by: HOSPITALIST

## 2017-01-01 PROCEDURE — 25010000003 CEFTRIAXONE PER 250 MG: Performed by: HOSPITALIST

## 2017-01-01 PROCEDURE — 83605 ASSAY OF LACTIC ACID: CPT | Performed by: EMERGENCY MEDICINE

## 2017-01-01 PROCEDURE — 99232 SBSQ HOSP IP/OBS MODERATE 35: CPT | Performed by: PSYCHIATRY & NEUROLOGY

## 2017-01-01 PROCEDURE — 80053 COMPREHEN METABOLIC PANEL: CPT | Performed by: INTERNAL MEDICINE

## 2017-01-01 PROCEDURE — 25010000002 ZIPRASIDONE MESYLATE PER 10 MG: Performed by: PSYCHIATRY & NEUROLOGY

## 2017-01-01 PROCEDURE — 25010000003 LEVETIRACETAM IN NACL 0.54% 1500 MG/100ML SOLUTION: Performed by: PSYCHIATRY & NEUROLOGY

## 2017-01-01 PROCEDURE — 70496 CT ANGIOGRAPHY HEAD: CPT

## 2017-01-01 PROCEDURE — 25010000002 HALOPERIDOL LACTATE PER 5 MG

## 2017-01-01 PROCEDURE — 92610 EVALUATE SWALLOWING FUNCTION: CPT

## 2017-01-01 PROCEDURE — 85025 COMPLETE CBC W/AUTO DIFF WBC: CPT | Performed by: EMERGENCY MEDICINE

## 2017-01-01 PROCEDURE — 36415 COLL VENOUS BLD VENIPUNCTURE: CPT

## 2017-01-01 PROCEDURE — 25010000002 LEVETRIRACETAM PER 10 MG: Performed by: PSYCHIATRY & NEUROLOGY

## 2017-01-01 PROCEDURE — 25010000002 DIAZEPAM PER 5 MG: Performed by: INTERNAL MEDICINE

## 2017-01-01 PROCEDURE — 84132 ASSAY OF SERUM POTASSIUM: CPT | Performed by: INTERNAL MEDICINE

## 2017-01-01 PROCEDURE — A9577 INJ MULTIHANCE: HCPCS | Performed by: INTERNAL MEDICINE

## 2017-01-01 PROCEDURE — 99308 SBSQ NF CARE LOW MDM 20: CPT | Performed by: HOSPITALIST

## 2017-01-01 PROCEDURE — 85027 COMPLETE CBC AUTOMATED: CPT | Performed by: INTERNAL MEDICINE

## 2017-01-01 PROCEDURE — 71010 HC CHEST PA OR AP: CPT

## 2017-01-01 PROCEDURE — 99232 SBSQ HOSP IP/OBS MODERATE 35: CPT | Performed by: INTERNAL MEDICINE

## 2017-01-01 PROCEDURE — 63710000001 INSULIN ASPART PER 5 UNITS: Performed by: INTERNAL MEDICINE

## 2017-01-01 PROCEDURE — 86901 BLOOD TYPING SEROLOGIC RH(D): CPT | Performed by: EMERGENCY MEDICINE

## 2017-01-01 PROCEDURE — 99231 SBSQ HOSP IP/OBS SF/LOW 25: CPT | Performed by: INTERNAL MEDICINE

## 2017-01-01 PROCEDURE — 86900 BLOOD TYPING SEROLOGIC ABO: CPT

## 2017-01-01 PROCEDURE — 86901 BLOOD TYPING SEROLOGIC RH(D): CPT

## 2017-01-01 PROCEDURE — 25010000003 POTASSIUM CHLORIDE 10 MEQ/100ML SOLUTION: Performed by: INTERNAL MEDICINE

## 2017-01-01 PROCEDURE — 96372 THER/PROPH/DIAG INJ SC/IM: CPT | Performed by: INTERNAL MEDICINE

## 2017-01-01 PROCEDURE — 80061 LIPID PANEL: CPT | Performed by: INTERNAL MEDICINE

## 2017-01-01 PROCEDURE — 99232 SBSQ HOSP IP/OBS MODERATE 35: CPT | Performed by: PHYSICIAN ASSISTANT

## 2017-01-01 PROCEDURE — 97110 THERAPEUTIC EXERCISES: CPT

## 2017-01-01 PROCEDURE — 87086 URINE CULTURE/COLONY COUNT: CPT | Performed by: EMERGENCY MEDICINE

## 2017-01-01 PROCEDURE — 0 IOPAMIDOL PER 1 ML: Performed by: INTERNAL MEDICINE

## 2017-01-01 PROCEDURE — 99222 1ST HOSP IP/OBS MODERATE 55: CPT | Performed by: PSYCHIATRY & NEUROLOGY

## 2017-01-01 PROCEDURE — 70450 CT HEAD/BRAIN W/O DYE: CPT

## 2017-01-01 PROCEDURE — 85025 COMPLETE CBC W/AUTO DIFF WBC: CPT | Performed by: INTERNAL MEDICINE

## 2017-01-01 PROCEDURE — 99233 SBSQ HOSP IP/OBS HIGH 50: CPT | Performed by: INTERNAL MEDICINE

## 2017-01-01 PROCEDURE — 70553 MRI BRAIN STEM W/O & W/DYE: CPT

## 2017-01-01 PROCEDURE — 84484 ASSAY OF TROPONIN QUANT: CPT | Performed by: INTERNAL MEDICINE

## 2017-01-01 PROCEDURE — 82140 ASSAY OF AMMONIA: CPT | Performed by: HOSPITALIST

## 2017-01-01 PROCEDURE — 25010000002 CYANOCOBALAMIN PER 1000 MCG: Performed by: INTERNAL MEDICINE

## 2017-01-01 PROCEDURE — 71250 CT THORAX DX C-: CPT

## 2017-01-01 PROCEDURE — 80053 COMPREHEN METABOLIC PANEL: CPT | Performed by: EMERGENCY MEDICINE

## 2017-01-01 PROCEDURE — 25010000002 HALOPERIDOL LACTATE PER 5 MG: Performed by: INTERNAL MEDICINE

## 2017-01-01 PROCEDURE — 81001 URINALYSIS AUTO W/SCOPE: CPT | Performed by: EMERGENCY MEDICINE

## 2017-01-01 PROCEDURE — 25010000002 ONDANSETRON PER 1 MG

## 2017-01-01 PROCEDURE — 97165 OT EVAL LOW COMPLEX 30 MIN: CPT

## 2017-01-01 PROCEDURE — 25010000003 CEFAZOLIN PER 500 MG: Performed by: INTERNAL MEDICINE

## 2017-01-01 PROCEDURE — 25010000002 MAGNESIUM SULFATE IN D5W 1G/100ML (PREMIX) 10-5 MG/ML-% SOLUTION: Performed by: EMERGENCY MEDICINE

## 2017-01-01 PROCEDURE — 63710000001 INSULIN DETEMER PER 5 UNITS: Performed by: INTERNAL MEDICINE

## 2017-01-01 PROCEDURE — 25010000002 AMPICILLIN SODIUM

## 2017-01-01 PROCEDURE — 83036 HEMOGLOBIN GLYCOSYLATED A1C: CPT | Performed by: INTERNAL MEDICINE

## 2017-01-01 PROCEDURE — 25010000002 VANCOMYCIN: Performed by: HOSPITALIST

## 2017-01-01 PROCEDURE — 99304 1ST NF CARE SF/LOW MDM 25: CPT | Performed by: INTERNAL MEDICINE

## 2017-01-01 PROCEDURE — 93005 ELECTROCARDIOGRAM TRACING: CPT

## 2017-01-01 PROCEDURE — 97110 THERAPEUTIC EXERCISES: CPT | Performed by: PHYSICAL THERAPIST

## 2017-01-01 PROCEDURE — 85730 THROMBOPLASTIN TIME PARTIAL: CPT | Performed by: INTERNAL MEDICINE

## 2017-01-01 PROCEDURE — C8929 TTE W OR WO FOL WCON,DOPPLER: HCPCS

## 2017-01-01 PROCEDURE — 70549 MR ANGIOGRAPH NECK W/O&W/DYE: CPT

## 2017-01-01 PROCEDURE — 97162 PT EVAL MOD COMPLEX 30 MIN: CPT

## 2017-01-01 PROCEDURE — 84132 ASSAY OF SERUM POTASSIUM: CPT | Performed by: HOSPITALIST

## 2017-01-01 PROCEDURE — 83735 ASSAY OF MAGNESIUM: CPT | Performed by: EMERGENCY MEDICINE

## 2017-01-01 PROCEDURE — 95950 HC EEG W/O VIDEO RECORDING EACH 24 HRS: CPT

## 2017-01-01 PROCEDURE — 63710000001 INSULIN DETEMER PER 5 UNITS: Performed by: HOSPITALIST

## 2017-01-01 PROCEDURE — C9132 KCENTRA, PER I.U.: HCPCS | Performed by: EMERGENCY MEDICINE

## 2017-01-01 PROCEDURE — 90732 PPSV23 VACC 2 YRS+ SUBQ/IM: CPT | Performed by: INTERNAL MEDICINE

## 2017-01-01 PROCEDURE — 25010000002 LORAZEPAM PER 2 MG: Performed by: PSYCHIATRY & NEUROLOGY

## 2017-01-01 PROCEDURE — 25010000002 PROPOFOL 10 MG/ML EMULSION: Performed by: ANESTHESIOLOGY

## 2017-01-01 PROCEDURE — 84484 ASSAY OF TROPONIN QUANT: CPT | Performed by: EMERGENCY MEDICINE

## 2017-01-01 PROCEDURE — 96372 THER/PROPH/DIAG INJ SC/IM: CPT

## 2017-01-01 PROCEDURE — 80048 BASIC METABOLIC PNL TOTAL CA: CPT | Performed by: HOSPITALIST

## 2017-01-01 PROCEDURE — 87077 CULTURE AEROBIC IDENTIFY: CPT | Performed by: EMERGENCY MEDICINE

## 2017-01-01 PROCEDURE — G0009 ADMIN PNEUMOCOCCAL VACCINE: HCPCS | Performed by: INTERNAL MEDICINE

## 2017-01-01 PROCEDURE — 95956: CPT | Performed by: PSYCHIATRY & NEUROLOGY

## 2017-01-01 PROCEDURE — 86900 BLOOD TYPING SEROLOGIC ABO: CPT | Performed by: EMERGENCY MEDICINE

## 2017-01-01 PROCEDURE — 92610 EVALUATE SWALLOWING FUNCTION: CPT | Performed by: SPEECH-LANGUAGE PATHOLOGIST

## 2017-01-01 PROCEDURE — 93010 ELECTROCARDIOGRAM REPORT: CPT | Performed by: INTERNAL MEDICINE

## 2017-01-01 PROCEDURE — 25010000002 HALOPERIDOL LACTATE PER 5 MG: Performed by: HOSPITALIST

## 2017-01-01 PROCEDURE — 25010000004 PNEUMOCOCCAL VAC POLYVALENT PER 0.5 ML: Performed by: INTERNAL MEDICINE

## 2017-01-01 PROCEDURE — 25010000002 PROTHROMBIN COMPLEX CONC HUMAN 1000 UNITS KIT: Performed by: EMERGENCY MEDICINE

## 2017-01-01 PROCEDURE — 74176 CT ABD & PELVIS W/O CONTRAST: CPT

## 2017-01-01 PROCEDURE — 71020 HC CHEST PA AND LATERAL: CPT

## 2017-01-01 PROCEDURE — 94799 UNLISTED PULMONARY SVC/PX: CPT

## 2017-01-01 PROCEDURE — 0DH63UZ INSERTION OF FEEDING DEVICE INTO STOMACH, PERCUTANEOUS APPROACH: ICD-10-PCS | Performed by: INTERNAL MEDICINE

## 2017-01-01 PROCEDURE — 93306 TTE W/DOPPLER COMPLETE: CPT | Performed by: INTERNAL MEDICINE

## 2017-01-01 PROCEDURE — 87086 URINE CULTURE/COLONY COUNT: CPT | Performed by: INTERNAL MEDICINE

## 2017-01-01 PROCEDURE — 99222 1ST HOSP IP/OBS MODERATE 55: CPT | Performed by: INTERNAL MEDICINE

## 2017-01-01 PROCEDURE — G0008 ADMIN INFLUENZA VIRUS VAC: HCPCS | Performed by: INTERNAL MEDICINE

## 2017-01-01 PROCEDURE — 84145 PROCALCITONIN (PCT): CPT | Performed by: HOSPITALIST

## 2017-01-01 PROCEDURE — 85610 PROTHROMBIN TIME: CPT | Performed by: EMERGENCY MEDICINE

## 2017-01-01 PROCEDURE — 99232 SBSQ HOSP IP/OBS MODERATE 35: CPT | Performed by: NURSE PRACTITIONER

## 2017-01-01 PROCEDURE — 25010000002 PERFLUTREN (DEFINITY) 8.476 MG IN SODIUM CHLORIDE 10 ML INJECTION: Performed by: INTERNAL MEDICINE

## 2017-01-01 PROCEDURE — 25010000004 INFLUENZA VAC SPLIT QUAD 0.5 ML SUSPENSION PREFILLED SYRINGE: Performed by: INTERNAL MEDICINE

## 2017-01-01 PROCEDURE — 86850 RBC ANTIBODY SCREEN: CPT | Performed by: EMERGENCY MEDICINE

## 2017-01-01 PROCEDURE — 93005 ELECTROCARDIOGRAM TRACING: CPT | Performed by: EMERGENCY MEDICINE

## 2017-01-01 PROCEDURE — 73060 X-RAY EXAM OF HUMERUS: CPT

## 2017-01-01 PROCEDURE — 25010000002 ONDANSETRON PER 1 MG: Performed by: HOSPITALIST

## 2017-01-01 PROCEDURE — 93005 ELECTROCARDIOGRAM TRACING: CPT | Performed by: INTERNAL MEDICINE

## 2017-01-01 PROCEDURE — 85025 COMPLETE CBC W/AUTO DIFF WBC: CPT | Performed by: HOSPITALIST

## 2017-01-01 PROCEDURE — 70544 MR ANGIOGRAPHY HEAD W/O DYE: CPT

## 2017-01-01 PROCEDURE — 99307 SBSQ NF CARE SF MDM 10: CPT | Performed by: HOSPITALIST

## 2017-01-01 PROCEDURE — 83735 ASSAY OF MAGNESIUM: CPT | Performed by: INTERNAL MEDICINE

## 2017-01-01 PROCEDURE — 99221 1ST HOSP IP/OBS SF/LOW 40: CPT | Performed by: NURSE PRACTITIONER

## 2017-01-01 PROCEDURE — 93880 EXTRACRANIAL BILAT STUDY: CPT

## 2017-01-01 PROCEDURE — 80162 ASSAY OF DIGOXIN TOTAL: CPT | Performed by: EMERGENCY MEDICINE

## 2017-01-01 PROCEDURE — 25010000003 LEVETIRACETAM IN NACL 0.75% 1000 MG/100ML SOLUTION: Performed by: PSYCHIATRY & NEUROLOGY

## 2017-01-01 PROCEDURE — 87493 C DIFF AMPLIFIED PROBE: CPT | Performed by: HOSPITALIST

## 2017-01-01 PROCEDURE — 87186 SC STD MICRODIL/AGAR DIL: CPT | Performed by: EMERGENCY MEDICINE

## 2017-01-01 PROCEDURE — 80299 QUANTITATIVE ASSAY DRUG: CPT | Performed by: HOSPITALIST

## 2017-01-01 PROCEDURE — 81001 URINALYSIS AUTO W/SCOPE: CPT | Performed by: INTERNAL MEDICINE

## 2017-01-01 PROCEDURE — P9016 RBC LEUKOCYTES REDUCED: HCPCS

## 2017-01-01 PROCEDURE — 80164 ASSAY DIPROPYLACETIC ACD TOT: CPT | Performed by: PSYCHIATRY & NEUROLOGY

## 2017-01-01 PROCEDURE — 90674 CCIIV4 VAC NO PRSV 0.5 ML IM: CPT | Performed by: INTERNAL MEDICINE

## 2017-01-01 PROCEDURE — 25010000002 MAGNESIUM SULFATE IN D5W 1G/100ML (PREMIX) 10-5 MG/ML-% SOLUTION: Performed by: HOSPITALIST

## 2017-01-01 PROCEDURE — 85027 COMPLETE CBC AUTOMATED: CPT | Performed by: HOSPITALIST

## 2017-01-01 PROCEDURE — 36600 WITHDRAWAL OF ARTERIAL BLOOD: CPT

## 2017-01-01 PROCEDURE — 97535 SELF CARE MNGMENT TRAINING: CPT

## 2017-01-01 PROCEDURE — 82803 BLOOD GASES ANY COMBINATION: CPT

## 2017-01-01 PROCEDURE — 36416 COLLJ CAPILLARY BLOOD SPEC: CPT | Performed by: INTERNAL MEDICINE

## 2017-01-01 PROCEDURE — 99231 SBSQ HOSP IP/OBS SF/LOW 25: CPT | Performed by: PSYCHIATRY & NEUROLOGY

## 2017-01-01 PROCEDURE — 74000 HC ABDOMEN KUB: CPT

## 2017-01-01 PROCEDURE — 93306 TTE W/DOPPLER COMPLETE: CPT

## 2017-01-01 PROCEDURE — 25010000003 POTASSIUM CHLORIDE 10 MEQ/100ML SOLUTION: Performed by: EMERGENCY MEDICINE

## 2017-01-01 PROCEDURE — 70498 CT ANGIOGRAPHY NECK: CPT

## 2017-01-01 PROCEDURE — 97161 PT EVAL LOW COMPLEX 20 MIN: CPT | Performed by: PHYSICAL THERAPIST

## 2017-01-01 PROCEDURE — 99221 1ST HOSP IP/OBS SF/LOW 40: CPT | Performed by: INTERNAL MEDICINE

## 2017-01-01 PROCEDURE — 86920 COMPATIBILITY TEST SPIN: CPT

## 2017-01-01 PROCEDURE — 36430 TRANSFUSION BLD/BLD COMPNT: CPT

## 2017-01-01 PROCEDURE — 83735 ASSAY OF MAGNESIUM: CPT | Performed by: HOSPITALIST

## 2017-01-01 PROCEDURE — 25010000002 VITAMIN K1 PER 1 MG: Performed by: EMERGENCY MEDICINE

## 2017-01-01 PROCEDURE — 87040 BLOOD CULTURE FOR BACTERIA: CPT | Performed by: EMERGENCY MEDICINE

## 2017-01-01 PROCEDURE — 43246 EGD PLACE GASTROSTOMY TUBE: CPT | Performed by: INTERNAL MEDICINE

## 2017-01-01 RX ORDER — PROPOFOL 10 MG/ML
VIAL (ML) INTRAVENOUS CONTINUOUS PRN
Status: DISCONTINUED | OUTPATIENT
Start: 2017-01-01 | End: 2017-01-01 | Stop reason: SURG

## 2017-01-01 RX ORDER — POTASSIUM CHLORIDE, DEXTROSE MONOHYDRATE AND SODIUM CHLORIDE 300; 5; 900 MG/100ML; G/100ML; MG/100ML
75 INJECTION, SOLUTION INTRAVENOUS CONTINUOUS
Status: DISCONTINUED | OUTPATIENT
Start: 2017-01-01 | End: 2017-01-01

## 2017-01-01 RX ORDER — QUETIAPINE FUMARATE 25 MG/1
25 TABLET, FILM COATED ORAL 3 TIMES DAILY
Status: DISCONTINUED | OUTPATIENT
Start: 2017-01-01 | End: 2017-01-01 | Stop reason: HOSPADM

## 2017-01-01 RX ORDER — ZIPRASIDONE MESYLATE 20 MG/ML
10 INJECTION, POWDER, LYOPHILIZED, FOR SOLUTION INTRAMUSCULAR EVERY 6 HOURS PRN
Status: DISCONTINUED | OUTPATIENT
Start: 2017-01-01 | End: 2017-01-01 | Stop reason: HOSPADM

## 2017-01-01 RX ORDER — DEXTROSE MONOHYDRATE 25 G/50ML
25 INJECTION, SOLUTION INTRAVENOUS
Status: DISCONTINUED | OUTPATIENT
Start: 2017-01-01 | End: 2017-01-01 | Stop reason: HOSPADM

## 2017-01-01 RX ORDER — WARFARIN SODIUM 5 MG/1
5 TABLET ORAL 3 TIMES WEEKLY
Status: DISCONTINUED | OUTPATIENT
Start: 2017-01-01 | End: 2017-01-01 | Stop reason: HOSPADM

## 2017-01-01 RX ORDER — ATORVASTATIN CALCIUM 20 MG/1
20 TABLET, FILM COATED ORAL DAILY
Status: DISCONTINUED | OUTPATIENT
Start: 2017-01-01 | End: 2017-01-01 | Stop reason: HOSPADM

## 2017-01-01 RX ORDER — POTASSIUM CHLORIDE 750 MG/1
40 CAPSULE, EXTENDED RELEASE ORAL AS NEEDED
Status: DISCONTINUED | OUTPATIENT
Start: 2017-01-01 | End: 2017-01-01 | Stop reason: HOSPADM

## 2017-01-01 RX ORDER — SENNA AND DOCUSATE SODIUM 50; 8.6 MG/1; MG/1
2 TABLET, FILM COATED ORAL NIGHTLY
Status: DISCONTINUED | OUTPATIENT
Start: 2017-01-01 | End: 2017-01-01 | Stop reason: HOSPADM

## 2017-01-01 RX ORDER — CYANOCOBALAMIN 1000 UG/ML
1000 INJECTION, SOLUTION INTRAMUSCULAR; SUBCUTANEOUS ONCE
Status: COMPLETED | OUTPATIENT
Start: 2017-01-01 | End: 2017-01-01

## 2017-01-01 RX ORDER — CYANOCOBALAMIN 1000 UG/ML
1000 INJECTION, SOLUTION INTRAMUSCULAR; SUBCUTANEOUS ONCE
Status: CANCELLED | OUTPATIENT
Start: 2017-01-01

## 2017-01-01 RX ORDER — POTASSIUM CHLORIDE 1.5 G/1.77G
40 POWDER, FOR SOLUTION ORAL AS NEEDED
Status: DISCONTINUED | OUTPATIENT
Start: 2017-01-01 | End: 2017-01-01 | Stop reason: HOSPADM

## 2017-01-01 RX ORDER — SODIUM CHLORIDE 0.9 % (FLUSH) 0.9 %
10 SYRINGE (ML) INJECTION AS NEEDED
Status: DISCONTINUED | OUTPATIENT
Start: 2017-01-01 | End: 2017-01-01 | Stop reason: HOSPADM

## 2017-01-01 RX ORDER — POTASSIUM CHLORIDE 1.5 G/1.77G
40 POWDER, FOR SOLUTION ORAL AS NEEDED
Status: DISCONTINUED | OUTPATIENT
Start: 2017-01-01 | End: 2017-01-01 | Stop reason: CLARIF

## 2017-01-01 RX ORDER — QUETIAPINE FUMARATE 25 MG/1
12.5 TABLET, FILM COATED ORAL 2 TIMES DAILY
Status: DISCONTINUED | OUTPATIENT
Start: 2017-01-01 | End: 2017-01-01

## 2017-01-01 RX ORDER — QUETIAPINE FUMARATE 25 MG/1
25 TABLET, FILM COATED ORAL 3 TIMES DAILY
Start: 2017-01-01

## 2017-01-01 RX ORDER — ONDANSETRON 4 MG/1
4 TABLET, ORALLY DISINTEGRATING ORAL EVERY 6 HOURS PRN
Status: DISCONTINUED | OUTPATIENT
Start: 2017-01-01 | End: 2017-01-01 | Stop reason: HOSPADM

## 2017-01-01 RX ORDER — MAGNESIUM SULFATE HEPTAHYDRATE 40 MG/ML
4 INJECTION, SOLUTION INTRAVENOUS AS NEEDED
Status: DISCONTINUED | OUTPATIENT
Start: 2017-01-01 | End: 2017-01-01 | Stop reason: HOSPADM

## 2017-01-01 RX ORDER — LORAZEPAM 2 MG/ML
0.5 INJECTION INTRAMUSCULAR ONCE
Status: COMPLETED | OUTPATIENT
Start: 2017-01-01 | End: 2017-01-01

## 2017-01-01 RX ORDER — HALOPERIDOL 1 MG/1
1 TABLET ORAL EVERY 8 HOURS PRN
Status: DISCONTINUED | OUTPATIENT
Start: 2017-01-01 | End: 2017-01-01

## 2017-01-01 RX ORDER — VALPROIC ACID 250 MG/5ML
750 SOLUTION ORAL EVERY 8 HOURS SCHEDULED
Status: DISCONTINUED | OUTPATIENT
Start: 2017-01-01 | End: 2017-01-01 | Stop reason: HOSPADM

## 2017-01-01 RX ORDER — PROMETHAZINE HYDROCHLORIDE 25 MG/1
25 TABLET ORAL ONCE AS NEEDED
Status: DISCONTINUED | OUTPATIENT
Start: 2017-01-01 | End: 2017-01-01

## 2017-01-01 RX ORDER — SODIUM CHLORIDE 0.9 % (FLUSH) 0.9 %
1-10 SYRINGE (ML) INJECTION AS NEEDED
Status: DISCONTINUED | OUTPATIENT
Start: 2017-01-01 | End: 2017-01-01 | Stop reason: HOSPADM

## 2017-01-01 RX ORDER — CALCIUM ACETATE MONOHYDRATE AND ALUMINUM SULFATE TETRADECAHYDRATE 952; 1347 MG/2299MG; MG/2299MG
1 POWDER, FOR SOLUTION TOPICAL EVERY 8 HOURS SCHEDULED
Status: DISCONTINUED | OUTPATIENT
Start: 2017-01-01 | End: 2017-01-01 | Stop reason: HOSPADM

## 2017-01-01 RX ORDER — DIAZEPAM 5 MG/ML
2.5 INJECTION, SOLUTION INTRAMUSCULAR; INTRAVENOUS EVERY 4 HOURS PRN
Status: DISCONTINUED | OUTPATIENT
Start: 2017-01-01 | End: 2017-01-01

## 2017-01-01 RX ORDER — HALOPERIDOL 2 MG/1
2 TABLET ORAL EVERY 8 HOURS PRN
Status: DISCONTINUED | OUTPATIENT
Start: 2017-01-01 | End: 2017-01-01 | Stop reason: SDUPTHER

## 2017-01-01 RX ORDER — NYSTATIN 100000 U/G
OINTMENT TOPICAL EVERY 12 HOURS SCHEDULED
Status: DISCONTINUED | OUTPATIENT
Start: 2017-01-01 | End: 2017-01-01 | Stop reason: HOSPADM

## 2017-01-01 RX ORDER — DIGOXIN 125 MCG
125 TABLET ORAL
Start: 2017-01-01

## 2017-01-01 RX ORDER — DIGOXIN 125 MCG
125 TABLET ORAL
Status: DISCONTINUED | OUTPATIENT
Start: 2017-01-01 | End: 2017-01-01 | Stop reason: HOSPADM

## 2017-01-01 RX ORDER — CYANOCOBALAMIN 1000 UG/ML
1000 INJECTION, SOLUTION INTRAMUSCULAR; SUBCUTANEOUS ONCE
Status: CANCELLED | OUTPATIENT
Start: 2017-01-01 | End: 2017-01-01

## 2017-01-01 RX ORDER — PROMETHAZINE HYDROCHLORIDE 25 MG/1
25 SUPPOSITORY RECTAL ONCE AS NEEDED
Status: DISCONTINUED | OUTPATIENT
Start: 2017-01-01 | End: 2017-01-01

## 2017-01-01 RX ORDER — POTASSIUM CHLORIDE 7.45 MG/ML
10 INJECTION INTRAVENOUS
Status: DISCONTINUED | OUTPATIENT
Start: 2017-01-01 | End: 2017-01-01 | Stop reason: HOSPADM

## 2017-01-01 RX ORDER — HALOPERIDOL 2 MG/1
2 TABLET ORAL EVERY 8 HOURS PRN
Status: DISCONTINUED | OUTPATIENT
Start: 2017-01-01 | End: 2017-01-01 | Stop reason: HOSPADM

## 2017-01-01 RX ORDER — ASPIRIN 300 MG/1
300 SUPPOSITORY RECTAL DAILY
Status: DISCONTINUED | OUTPATIENT
Start: 2017-01-01 | End: 2017-01-01

## 2017-01-01 RX ORDER — CEFTRIAXONE SODIUM 1 G/50ML
1 INJECTION, SOLUTION INTRAVENOUS EVERY 24 HOURS
Status: DISCONTINUED | OUTPATIENT
Start: 2017-01-01 | End: 2017-01-01 | Stop reason: HOSPADM

## 2017-01-01 RX ORDER — METOPROLOL TARTRATE 50 MG/1
50 TABLET, FILM COATED ORAL EVERY 8 HOURS SCHEDULED
Status: DISCONTINUED | OUTPATIENT
Start: 2017-01-01 | End: 2017-01-01 | Stop reason: HOSPADM

## 2017-01-01 RX ORDER — HALOPERIDOL 1 MG/1
1 TABLET ORAL EVERY 8 HOURS SCHEDULED
Status: DISCONTINUED | OUTPATIENT
Start: 2017-01-01 | End: 2017-01-01

## 2017-01-01 RX ORDER — IPRATROPIUM BROMIDE AND ALBUTEROL SULFATE 2.5; .5 MG/3ML; MG/3ML
3 SOLUTION RESPIRATORY (INHALATION)
Status: DISCONTINUED | OUTPATIENT
Start: 2017-01-01 | End: 2017-01-01 | Stop reason: HOSPADM

## 2017-01-01 RX ORDER — SODIUM CHLORIDE 450 MG/100ML
75 INJECTION, SOLUTION INTRAVENOUS CONTINUOUS
Status: DISCONTINUED | OUTPATIENT
Start: 2017-01-01 | End: 2017-01-01

## 2017-01-01 RX ORDER — ACETAMINOPHEN 325 MG/1
650 TABLET ORAL EVERY 6 HOURS PRN
Status: DISCONTINUED | OUTPATIENT
Start: 2017-01-01 | End: 2017-01-01 | Stop reason: HOSPADM

## 2017-01-01 RX ORDER — ONDANSETRON 2 MG/ML
4 INJECTION INTRAMUSCULAR; INTRAVENOUS EVERY 6 HOURS PRN
Status: DISCONTINUED | OUTPATIENT
Start: 2017-01-01 | End: 2017-01-01 | Stop reason: HOSPADM

## 2017-01-01 RX ORDER — NICOTINE POLACRILEX 4 MG
15 LOZENGE BUCCAL
Status: DISCONTINUED | OUTPATIENT
Start: 2017-01-01 | End: 2017-01-01 | Stop reason: HOSPADM

## 2017-01-01 RX ORDER — ONDANSETRON 4 MG/1
4 TABLET, ORALLY DISINTEGRATING ORAL EVERY 6 HOURS PRN
Refills: 0
Start: 2017-01-01

## 2017-01-01 RX ORDER — AMOXICILLIN 500 MG/1
500 CAPSULE ORAL 3 TIMES DAILY
Qty: 15 CAPSULE | Refills: 0 | Status: SHIPPED | OUTPATIENT
Start: 2017-01-01 | End: 2017-01-01

## 2017-01-01 RX ORDER — DIAZEPAM 5 MG/ML
5 INJECTION, SOLUTION INTRAMUSCULAR; INTRAVENOUS ONCE
Status: COMPLETED | OUTPATIENT
Start: 2017-01-01 | End: 2017-01-01

## 2017-01-01 RX ORDER — VALPROIC ACID 250 MG/5ML
750 SOLUTION ORAL EVERY 8 HOURS SCHEDULED
Qty: 600 ML
Start: 2017-01-01

## 2017-01-01 RX ORDER — ZIPRASIDONE MESYLATE 20 MG/ML
20 INJECTION, POWDER, LYOPHILIZED, FOR SOLUTION INTRAMUSCULAR EVERY 4 HOURS PRN
Status: DISCONTINUED | OUTPATIENT
Start: 2017-01-01 | End: 2017-01-01

## 2017-01-01 RX ORDER — METOPROLOL TARTRATE 5 MG/5ML
INJECTION INTRAVENOUS
Status: COMPLETED
Start: 2017-01-01 | End: 2017-01-01

## 2017-01-01 RX ORDER — DIGOXIN 125 MCG
125 TABLET ORAL ONCE
Status: COMPLETED | OUTPATIENT
Start: 2017-01-01 | End: 2017-01-01

## 2017-01-01 RX ORDER — NITROGLYCERIN 0.4 MG/1
0.4 TABLET SUBLINGUAL
Status: DISCONTINUED | OUTPATIENT
Start: 2017-01-01 | End: 2017-01-01 | Stop reason: HOSPADM

## 2017-01-01 RX ORDER — ATORVASTATIN CALCIUM 20 MG/1
80 TABLET, FILM COATED ORAL DAILY
Start: 2017-01-01

## 2017-01-01 RX ORDER — ACETAMINOPHEN 325 MG/1
650 TABLET ORAL EVERY 4 HOURS PRN
Status: DISCONTINUED | OUTPATIENT
Start: 2017-01-01 | End: 2017-01-01 | Stop reason: HOSPADM

## 2017-01-01 RX ORDER — DEXTROSE AND SODIUM CHLORIDE 5; .9 G/100ML; G/100ML
75 INJECTION, SOLUTION INTRAVENOUS CONTINUOUS
Status: DISCONTINUED | OUTPATIENT
Start: 2017-01-01 | End: 2017-01-01

## 2017-01-01 RX ORDER — DIAZEPAM 5 MG/ML
5 INJECTION, SOLUTION INTRAMUSCULAR; INTRAVENOUS ONCE
Status: DISCONTINUED | OUTPATIENT
Start: 2017-01-01 | End: 2017-01-01

## 2017-01-01 RX ORDER — SODIUM CHLORIDE 9 MG/ML
100 INJECTION, SOLUTION INTRAVENOUS CONTINUOUS
Status: DISCONTINUED | OUTPATIENT
Start: 2017-01-01 | End: 2017-01-01

## 2017-01-01 RX ORDER — ATORVASTATIN CALCIUM 80 MG/1
80 TABLET, FILM COATED ORAL NIGHTLY
Status: DISCONTINUED | OUTPATIENT
Start: 2017-01-01 | End: 2017-01-01 | Stop reason: HOSPADM

## 2017-01-01 RX ORDER — SODIUM CHLORIDE 9 MG/ML
50 INJECTION, SOLUTION INTRAVENOUS CONTINUOUS
Status: DISCONTINUED | OUTPATIENT
Start: 2017-01-01 | End: 2017-01-01

## 2017-01-01 RX ORDER — WARFARIN SODIUM 5 MG/1
5 TABLET ORAL 3 TIMES WEEKLY
COMMUNITY
End: 2017-01-01 | Stop reason: HOSPADM

## 2017-01-01 RX ORDER — LEVETIRACETAM 15 MG/ML
1500 INJECTION INTRAVASCULAR EVERY 12 HOURS SCHEDULED
Status: DISCONTINUED | OUTPATIENT
Start: 2017-01-01 | End: 2017-01-01 | Stop reason: SDUPTHER

## 2017-01-01 RX ORDER — LEVETIRACETAM 10 MG/ML
1000 INJECTION INTRAVASCULAR EVERY 12 HOURS SCHEDULED
Status: DISCONTINUED | OUTPATIENT
Start: 2017-01-01 | End: 2017-01-01

## 2017-01-01 RX ORDER — ONDANSETRON 4 MG/1
4 TABLET, FILM COATED ORAL EVERY 6 HOURS PRN
Status: DISCONTINUED | OUTPATIENT
Start: 2017-01-01 | End: 2017-01-01 | Stop reason: HOSPADM

## 2017-01-01 RX ORDER — QUETIAPINE FUMARATE 25 MG/1
25 TABLET, FILM COATED ORAL NIGHTLY
Status: DISCONTINUED | OUTPATIENT
Start: 2017-01-01 | End: 2017-01-01

## 2017-01-01 RX ORDER — CALCIUM ACETATE MONOHYDRATE AND ALUMINUM SULFATE TETRADECAHYDRATE 952; 1347 MG/2299MG; MG/2299MG
1 POWDER, FOR SOLUTION TOPICAL EVERY 8 HOURS SCHEDULED
Refills: 12
Start: 2017-01-01

## 2017-01-01 RX ORDER — HALOPERIDOL 5 MG/ML
1 INJECTION INTRAMUSCULAR ONCE
Status: COMPLETED | OUTPATIENT
Start: 2017-01-01 | End: 2017-01-01

## 2017-01-01 RX ORDER — DIGOXIN 125 MCG
125 TABLET ORAL
Status: DISCONTINUED | OUTPATIENT
Start: 2017-01-01 | End: 2017-01-01 | Stop reason: SDUPTHER

## 2017-01-01 RX ORDER — WARFARIN SODIUM 5 MG/1
TABLET ORAL
Qty: 100 TABLET | Refills: 0 | OUTPATIENT
Start: 2017-01-01

## 2017-01-01 RX ORDER — QUETIAPINE FUMARATE 25 MG/1
12.5 TABLET, FILM COATED ORAL NIGHTLY
Status: DISCONTINUED | OUTPATIENT
Start: 2017-01-01 | End: 2017-01-01

## 2017-01-01 RX ORDER — WARFARIN SODIUM 7.5 MG/1
7.5 TABLET ORAL
COMMUNITY
End: 2017-01-01 | Stop reason: HOSPADM

## 2017-01-01 RX ORDER — ASPIRIN 81 MG/1
81 TABLET, CHEWABLE ORAL DAILY
Start: 2017-01-01

## 2017-01-01 RX ORDER — HALOPERIDOL 5 MG/ML
4 INJECTION INTRAMUSCULAR EVERY 4 HOURS PRN
Status: DISCONTINUED | OUTPATIENT
Start: 2017-01-01 | End: 2017-01-01 | Stop reason: HOSPADM

## 2017-01-01 RX ORDER — ATORVASTATIN CALCIUM 20 MG/1
20 TABLET, FILM COATED ORAL DAILY
Status: DISCONTINUED | OUTPATIENT
Start: 2017-01-01 | End: 2017-01-01

## 2017-01-01 RX ORDER — PROMETHAZINE HYDROCHLORIDE 25 MG/ML
12.5 INJECTION, SOLUTION INTRAMUSCULAR; INTRAVENOUS ONCE AS NEEDED
Status: DISCONTINUED | OUTPATIENT
Start: 2017-01-01 | End: 2017-01-01

## 2017-01-01 RX ORDER — ASPIRIN 81 MG/1
81 TABLET, CHEWABLE ORAL DAILY
Status: DISCONTINUED | OUTPATIENT
Start: 2017-01-01 | End: 2017-01-01 | Stop reason: HOSPADM

## 2017-01-01 RX ORDER — HALOPERIDOL 5 MG/ML
4 INJECTION INTRAMUSCULAR EVERY 4 HOURS PRN
Status: DISCONTINUED | OUTPATIENT
Start: 2017-01-01 | End: 2017-01-01 | Stop reason: SDUPTHER

## 2017-01-01 RX ORDER — LEVETIRACETAM 100 MG/ML
1500 SOLUTION ORAL EVERY 12 HOURS SCHEDULED
Start: 2017-01-01

## 2017-01-01 RX ORDER — QUETIAPINE FUMARATE 25 MG/1
25 TABLET, FILM COATED ORAL EVERY 12 HOURS SCHEDULED
Status: DISCONTINUED | OUTPATIENT
Start: 2017-01-01 | End: 2017-01-01 | Stop reason: HOSPADM

## 2017-01-01 RX ORDER — METOPROLOL TARTRATE 50 MG/1
50 TABLET, FILM COATED ORAL EVERY 12 HOURS SCHEDULED
Status: DISCONTINUED | OUTPATIENT
Start: 2017-01-01 | End: 2017-01-01

## 2017-01-01 RX ORDER — PANTOPRAZOLE SODIUM 40 MG/10ML
80 INJECTION, POWDER, LYOPHILIZED, FOR SOLUTION INTRAVENOUS ONCE
Status: COMPLETED | OUTPATIENT
Start: 2017-01-01 | End: 2017-01-01

## 2017-01-01 RX ORDER — WARFARIN SODIUM 7.5 MG/1
7.5 TABLET ORAL
Status: DISCONTINUED | OUTPATIENT
Start: 2017-01-01 | End: 2017-01-01 | Stop reason: HOSPADM

## 2017-01-01 RX ORDER — FOSPHENYTOIN SODIUM 50 MG/ML
15 INJECTION, SOLUTION INTRAMUSCULAR; INTRAVENOUS ONCE
Status: DISCONTINUED | OUTPATIENT
Start: 2017-01-01 | End: 2017-01-01 | Stop reason: SDUPTHER

## 2017-01-01 RX ORDER — METOPROLOL TARTRATE 50 MG/1
50 TABLET, FILM COATED ORAL 2 TIMES DAILY
Status: DISCONTINUED | OUTPATIENT
Start: 2017-01-01 | End: 2017-01-01 | Stop reason: HOSPADM

## 2017-01-01 RX ORDER — CEFTRIAXONE SODIUM 1 G/50ML
1 INJECTION, SOLUTION INTRAVENOUS EVERY 24 HOURS
Status: DISCONTINUED | OUTPATIENT
Start: 2017-01-01 | End: 2017-01-01

## 2017-01-01 RX ORDER — SODIUM CHLORIDE 9 MG/ML
75 INJECTION, SOLUTION INTRAVENOUS CONTINUOUS
Status: DISCONTINUED | OUTPATIENT
Start: 2017-01-01 | End: 2017-01-01

## 2017-01-01 RX ORDER — POTASSIUM CHLORIDE 7.45 MG/ML
10 INJECTION INTRAVENOUS ONCE
Status: COMPLETED | OUTPATIENT
Start: 2017-01-01 | End: 2017-01-01

## 2017-01-01 RX ORDER — HALOPERIDOL 5 MG/ML
2 INJECTION INTRAMUSCULAR
Status: DISCONTINUED | OUTPATIENT
Start: 2017-01-01 | End: 2017-01-01

## 2017-01-01 RX ORDER — MAGNESIUM SULFATE HEPTAHYDRATE 40 MG/ML
2 INJECTION, SOLUTION INTRAVENOUS AS NEEDED
Status: DISCONTINUED | OUTPATIENT
Start: 2017-01-01 | End: 2017-01-01 | Stop reason: HOSPADM

## 2017-01-01 RX ORDER — HALOPERIDOL 5 MG/ML
INJECTION INTRAMUSCULAR
Status: COMPLETED
Start: 2017-01-01 | End: 2017-01-01

## 2017-01-01 RX ORDER — CEFTRIAXONE SODIUM 1 G/50ML
1 INJECTION, SOLUTION INTRAVENOUS ONCE
Status: COMPLETED | OUTPATIENT
Start: 2017-01-01 | End: 2017-01-01

## 2017-01-01 RX ORDER — SODIUM CHLORIDE, SODIUM LACTATE, POTASSIUM CHLORIDE, CALCIUM CHLORIDE 600; 310; 30; 20 MG/100ML; MG/100ML; MG/100ML; MG/100ML
1000 INJECTION, SOLUTION INTRAVENOUS CONTINUOUS PRN
Status: DISCONTINUED | OUTPATIENT
Start: 2017-01-01 | End: 2017-01-01 | Stop reason: HOSPADM

## 2017-01-01 RX ORDER — LEVETIRACETAM 100 MG/ML
1500 SOLUTION ORAL EVERY 12 HOURS SCHEDULED
Status: DISCONTINUED | OUTPATIENT
Start: 2017-01-01 | End: 2017-01-01 | Stop reason: HOSPADM

## 2017-01-01 RX ORDER — ASPIRIN 325 MG
325 TABLET ORAL DAILY
Status: DISCONTINUED | OUTPATIENT
Start: 2017-01-01 | End: 2017-01-01

## 2017-01-01 RX ORDER — LABETALOL HYDROCHLORIDE 5 MG/ML
20 INJECTION, SOLUTION INTRAVENOUS
Status: DISCONTINUED | OUTPATIENT
Start: 2017-01-01 | End: 2017-01-01 | Stop reason: HOSPADM

## 2017-01-01 RX ORDER — METOPROLOL TARTRATE 50 MG/1
50 TABLET, FILM COATED ORAL EVERY 8 HOURS SCHEDULED
Start: 2017-01-01

## 2017-01-01 RX ORDER — ONDANSETRON 2 MG/ML
INJECTION INTRAMUSCULAR; INTRAVENOUS
Status: COMPLETED
Start: 2017-01-01 | End: 2017-01-01

## 2017-01-01 RX ORDER — QUETIAPINE FUMARATE 25 MG/1
25 TABLET, FILM COATED ORAL EVERY 12 HOURS SCHEDULED
Start: 2017-01-01 | End: 2017-01-01 | Stop reason: HOSPADM

## 2017-01-01 RX ORDER — DIAZEPAM 5 MG/ML
2.5 INJECTION, SOLUTION INTRAMUSCULAR; INTRAVENOUS ONCE
Status: COMPLETED | OUTPATIENT
Start: 2017-01-01 | End: 2017-01-01

## 2017-01-01 RX ORDER — LEVETIRACETAM 5 MG/ML
500 INJECTION INTRAVASCULAR EVERY 12 HOURS SCHEDULED
Status: DISCONTINUED | OUTPATIENT
Start: 2017-01-01 | End: 2017-01-01

## 2017-01-01 RX ORDER — SODIUM CHLORIDE 0.9 % (FLUSH) 0.9 %
3 SYRINGE (ML) INJECTION AS NEEDED
Status: DISCONTINUED | OUTPATIENT
Start: 2017-01-01 | End: 2017-01-01

## 2017-01-01 RX ORDER — NYSTATIN 100000 U/G
OINTMENT TOPICAL EVERY 12 HOURS SCHEDULED
Refills: 0
Start: 2017-01-01

## 2017-01-01 RX ORDER — ACETAMINOPHEN 325 MG/1
650 TABLET ORAL EVERY 6 HOURS PRN
COMMUNITY

## 2017-01-01 RX ADMIN — VANCOMYCIN HYDROCHLORIDE 1250 MG: 1 INJECTION, POWDER, LYOPHILIZED, FOR SOLUTION INTRAVENOUS at 22:19

## 2017-01-01 RX ADMIN — CEFTRIAXONE SODIUM 1 G: 1 INJECTION, SOLUTION INTRAVENOUS at 18:43

## 2017-01-01 RX ADMIN — DIGOXIN 125 MCG: 125 TABLET ORAL at 12:02

## 2017-01-01 RX ADMIN — CALCIUM ACETATE MONOHYDRATE AND ALUMINUM SULFATE TETRADECAHYDRATE 1 PACKET: 952; 1347 POWDER, FOR SOLUTION TOPICAL at 06:20

## 2017-01-01 RX ADMIN — POTASSIUM CHLORIDE 10 MEQ: 10 INJECTION, SOLUTION INTRAVENOUS at 12:20

## 2017-01-01 RX ADMIN — INSULIN ASPART 3 UNITS: 100 INJECTION, SOLUTION INTRAVENOUS; SUBCUTANEOUS at 12:43

## 2017-01-01 RX ADMIN — METOPROLOL TARTRATE 50 MG: 50 TABLET ORAL at 21:15

## 2017-01-01 RX ADMIN — AMPICILLIN SODIUM 500 MG: 500 INJECTION, POWDER, FOR SOLUTION INTRAMUSCULAR; INTRAVENOUS at 11:32

## 2017-01-01 RX ADMIN — DIAZEPAM 2.5 MG: 5 INJECTION, SOLUTION INTRAMUSCULAR; INTRAVENOUS at 02:51

## 2017-01-01 RX ADMIN — INSULIN ASPART 2 UNITS: 100 INJECTION, SOLUTION INTRAVENOUS; SUBCUTANEOUS at 17:50

## 2017-01-01 RX ADMIN — PHYTONADIONE 10 MG: 10 INJECTION, EMULSION INTRAMUSCULAR; INTRAVENOUS; SUBCUTANEOUS at 15:25

## 2017-01-01 RX ADMIN — METOPROLOL TARTRATE 50 MG: 50 TABLET ORAL at 14:34

## 2017-01-01 RX ADMIN — MICONAZOLE NITRATE: 2 POWDER TOPICAL at 08:01

## 2017-01-01 RX ADMIN — LEVETIRACETAM 1500 MG: 100 SOLUTION ORAL at 09:18

## 2017-01-01 RX ADMIN — INSULIN DETEMIR 20 UNITS: 100 INJECTION, SOLUTION SUBCUTANEOUS at 21:40

## 2017-01-01 RX ADMIN — INSULIN ASPART 3 UNITS: 100 INJECTION, SOLUTION INTRAVENOUS; SUBCUTANEOUS at 18:22

## 2017-01-01 RX ADMIN — VALPROIC ACID 750 MG: 250 SOLUTION ORAL at 05:57

## 2017-01-01 RX ADMIN — CALCIUM ACETATE MONOHYDRATE AND ALUMINUM SULFATE TETRADECAHYDRATE 1 PACKET: 952; 1347 POWDER, FOR SOLUTION TOPICAL at 23:05

## 2017-01-01 RX ADMIN — METOPROLOL TARTRATE 50 MG: 50 TABLET ORAL at 06:22

## 2017-01-01 RX ADMIN — POTASSIUM CHLORIDE, DEXTROSE MONOHYDRATE AND SODIUM CHLORIDE 75 ML/HR: 300; 5; 900 INJECTION, SOLUTION INTRAVENOUS at 06:22

## 2017-01-01 RX ADMIN — WARFARIN SODIUM 5 MG: 5 TABLET ORAL at 17:35

## 2017-01-01 RX ADMIN — VALPROATE SODIUM 750 MG: 100 INJECTION, SOLUTION INTRAVENOUS at 11:41

## 2017-01-01 RX ADMIN — METOPROLOL TARTRATE 50 MG: 50 TABLET ORAL at 09:08

## 2017-01-01 RX ADMIN — METOPROLOL TARTRATE 50 MG: 50 TABLET ORAL at 22:32

## 2017-01-01 RX ADMIN — POTASSIUM CHLORIDE 10 MEQ: 10 INJECTION, SOLUTION INTRAVENOUS at 19:03

## 2017-01-01 RX ADMIN — INSULIN ASPART 4 UNITS: 100 INJECTION, SOLUTION INTRAVENOUS; SUBCUTANEOUS at 12:44

## 2017-01-01 RX ADMIN — METOPROLOL TARTRATE 50 MG: 50 TABLET ORAL at 15:23

## 2017-01-01 RX ADMIN — LEVETIRACETAM 1500 MG: 100 SOLUTION ORAL at 00:05

## 2017-01-01 RX ADMIN — ONDANSETRON 4 MG: 2 INJECTION INTRAMUSCULAR; INTRAVENOUS at 11:43

## 2017-01-01 RX ADMIN — LEVETIRACETAM 1500 MG: 15 INJECTION INTRAVENOUS at 09:44

## 2017-01-01 RX ADMIN — ATORVASTATIN CALCIUM 20 MG: 20 TABLET, FILM COATED ORAL at 08:15

## 2017-01-01 RX ADMIN — INSULIN DETEMIR 10 UNITS: 100 INJECTION, SOLUTION SUBCUTANEOUS at 00:41

## 2017-01-01 RX ADMIN — INSULIN DETEMIR 10 UNITS: 100 INJECTION, SOLUTION SUBCUTANEOUS at 22:30

## 2017-01-01 RX ADMIN — VALPROIC ACID 750 MG: 250 SOLUTION ORAL at 21:50

## 2017-01-01 RX ADMIN — INSULIN ASPART 6 UNITS: 100 INJECTION, SOLUTION INTRAVENOUS; SUBCUTANEOUS at 06:41

## 2017-01-01 RX ADMIN — AMPICILLIN SODIUM 500 MG: 500 INJECTION, POWDER, FOR SOLUTION INTRAMUSCULAR; INTRAVENOUS at 22:48

## 2017-01-01 RX ADMIN — DOCUSATE SODIUM -SENNOSIDES 2 TABLET: 50; 8.6 TABLET, COATED ORAL at 20:59

## 2017-01-01 RX ADMIN — HALOPERIDOL LACTATE 4 MG: 5 INJECTION, SOLUTION INTRAMUSCULAR at 16:34

## 2017-01-01 RX ADMIN — VALPROATE SODIUM 750 MG: 100 INJECTION, SOLUTION INTRAVENOUS at 08:20

## 2017-01-01 RX ADMIN — VALPROATE SODIUM 750 MG: 100 INJECTION, SOLUTION INTRAVENOUS at 11:24

## 2017-01-01 RX ADMIN — LEVETIRACETAM 1500 MG: 100 SOLUTION ORAL at 21:02

## 2017-01-01 RX ADMIN — SODIUM CHLORIDE 100 ML/HR: 9 INJECTION, SOLUTION INTRAVENOUS at 22:42

## 2017-01-01 RX ADMIN — METOPROLOL TARTRATE 50 MG: 50 TABLET ORAL at 13:21

## 2017-01-01 RX ADMIN — NYSTATIN: 100000 OINTMENT TOPICAL at 21:02

## 2017-01-01 RX ADMIN — INSULIN ASPART 4 UNITS: 100 INJECTION, SOLUTION INTRAVENOUS; SUBCUTANEOUS at 08:12

## 2017-01-01 RX ADMIN — METOPROLOL TARTRATE 50 MG: 50 TABLET ORAL at 18:59

## 2017-01-01 RX ADMIN — INSULIN ASPART 3 UNITS: 100 INJECTION, SOLUTION INTRAVENOUS; SUBCUTANEOUS at 21:26

## 2017-01-01 RX ADMIN — INSULIN ASPART 4 UNITS: 100 INJECTION, SOLUTION INTRAVENOUS; SUBCUTANEOUS at 13:21

## 2017-01-01 RX ADMIN — LEVETIRACETAM 1500 MG: 15 INJECTION INTRAVENOUS at 08:26

## 2017-01-01 RX ADMIN — INSULIN ASPART 6 UNITS: 100 INJECTION, SOLUTION INTRAVENOUS; SUBCUTANEOUS at 12:13

## 2017-01-01 RX ADMIN — POTASSIUM CHLORIDE 10 MEQ: 10 INJECTION, SOLUTION INTRAVENOUS at 20:15

## 2017-01-01 RX ADMIN — INSULIN ASPART 2 UNITS: 100 INJECTION, SOLUTION INTRAVENOUS; SUBCUTANEOUS at 17:29

## 2017-01-01 RX ADMIN — ZIPRASIDONE MESYLATE 20 MG: 20 INJECTION, POWDER, LYOPHILIZED, FOR SOLUTION INTRAMUSCULAR at 17:29

## 2017-01-01 RX ADMIN — QUETIAPINE FUMARATE 25 MG: 25 TABLET, FILM COATED ORAL at 00:00

## 2017-01-01 RX ADMIN — CYANOCOBALAMIN 1000 MCG: 1000 INJECTION, SOLUTION INTRAMUSCULAR; SUBCUTANEOUS at 10:30

## 2017-01-01 RX ADMIN — INSULIN ASPART 4 UNITS: 100 INJECTION, SOLUTION INTRAVENOUS; SUBCUTANEOUS at 20:34

## 2017-01-01 RX ADMIN — VALPROIC ACID 750 MG: 250 SOLUTION ORAL at 17:44

## 2017-01-01 RX ADMIN — INSULIN ASPART 7 UNITS: 100 INJECTION, SOLUTION INTRAVENOUS; SUBCUTANEOUS at 16:51

## 2017-01-01 RX ADMIN — LEVETIRACETAM 1500 MG: 15 INJECTION INTRAVENOUS at 22:17

## 2017-01-01 RX ADMIN — METOPROLOL TARTRATE 50 MG: 50 TABLET ORAL at 09:01

## 2017-01-01 RX ADMIN — WARFARIN SODIUM 5 MG: 5 TABLET ORAL at 18:59

## 2017-01-01 RX ADMIN — SODIUM CHLORIDE 100 MG PE: 9 INJECTION, SOLUTION INTRAVENOUS at 07:12

## 2017-01-01 RX ADMIN — METOPROLOL TARTRATE 50 MG: 50 TABLET ORAL at 20:52

## 2017-01-01 RX ADMIN — QUETIAPINE FUMARATE 25 MG: 25 TABLET, FILM COATED ORAL at 21:45

## 2017-01-01 RX ADMIN — AMPICILLIN SODIUM 500 MG: 500 INJECTION, POWDER, FOR SOLUTION INTRAMUSCULAR; INTRAVENOUS at 16:39

## 2017-01-01 RX ADMIN — LEVETIRACETAM 500 MG: 5 INJECTION INTRAVENOUS at 22:48

## 2017-01-01 RX ADMIN — ATORVASTATIN CALCIUM 80 MG: 80 TABLET, FILM COATED ORAL at 00:00

## 2017-01-01 RX ADMIN — ZIPRASIDONE MESYLATE 10 MG: 20 INJECTION, POWDER, LYOPHILIZED, FOR SOLUTION INTRAMUSCULAR at 21:15

## 2017-01-01 RX ADMIN — LEVETIRACETAM 1500 MG: 100 SOLUTION ORAL at 23:07

## 2017-01-01 RX ADMIN — ZIPRASIDONE MESYLATE 10 MG: 20 INJECTION, POWDER, LYOPHILIZED, FOR SOLUTION INTRAMUSCULAR at 10:44

## 2017-01-01 RX ADMIN — INSULIN DETEMIR 20 UNITS: 100 INJECTION, SOLUTION SUBCUTANEOUS at 20:32

## 2017-01-01 RX ADMIN — AMPICILLIN SODIUM 500 MG: 500 INJECTION, POWDER, FOR SOLUTION INTRAMUSCULAR; INTRAVENOUS at 00:15

## 2017-01-01 RX ADMIN — AMPICILLIN SODIUM 500 MG: 500 INJECTION, POWDER, FOR SOLUTION INTRAMUSCULAR; INTRAVENOUS at 22:30

## 2017-01-01 RX ADMIN — ZIPRASIDONE MESYLATE 10 MG: 20 INJECTION, POWDER, LYOPHILIZED, FOR SOLUTION INTRAMUSCULAR at 13:21

## 2017-01-01 RX ADMIN — LEVETIRACETAM 1500 MG: 15 INJECTION INTRAVENOUS at 08:54

## 2017-01-01 RX ADMIN — INSULIN DETEMIR 10 UNITS: 100 INJECTION, SOLUTION SUBCUTANEOUS at 21:35

## 2017-01-01 RX ADMIN — METOPROLOL TARTRATE 50 MG: 50 TABLET ORAL at 06:23

## 2017-01-01 RX ADMIN — INSULIN ASPART 2 UNITS: 100 INJECTION, SOLUTION INTRAVENOUS; SUBCUTANEOUS at 13:42

## 2017-01-01 RX ADMIN — METOPROLOL TARTRATE 50 MG: 50 TABLET ORAL at 06:13

## 2017-01-01 RX ADMIN — LEVETIRACETAM 1500 MG: 100 SOLUTION ORAL at 21:50

## 2017-01-01 RX ADMIN — DEXTROSE AND SODIUM CHLORIDE 75 ML/HR: 5; 900 INJECTION, SOLUTION INTRAVENOUS at 13:49

## 2017-01-01 RX ADMIN — ASPIRIN 81 MG: 81 TABLET, CHEWABLE ORAL at 08:20

## 2017-01-01 RX ADMIN — CALCIUM ACETATE MONOHYDRATE AND ALUMINUM SULFATE TETRADECAHYDRATE 1 PACKET: 952; 1347 POWDER, FOR SOLUTION TOPICAL at 23:55

## 2017-01-01 RX ADMIN — POTASSIUM CHLORIDE, DEXTROSE MONOHYDRATE AND SODIUM CHLORIDE 75 ML/HR: 300; 5; 900 INJECTION, SOLUTION INTRAVENOUS at 17:14

## 2017-01-01 RX ADMIN — METOPROLOL TARTRATE 50 MG: 50 TABLET ORAL at 10:00

## 2017-01-01 RX ADMIN — INSULIN DETEMIR 10 UNITS: 100 INJECTION, SOLUTION SUBCUTANEOUS at 22:52

## 2017-01-01 RX ADMIN — QUETIAPINE FUMARATE 25 MG: 25 TABLET, FILM COATED ORAL at 08:20

## 2017-01-01 RX ADMIN — AMPICILLIN SODIUM 500 MG: 500 INJECTION, POWDER, FOR SOLUTION INTRAMUSCULAR; INTRAVENOUS at 03:46

## 2017-01-01 RX ADMIN — QUETIAPINE FUMARATE 25 MG: 25 TABLET, FILM COATED ORAL at 21:44

## 2017-01-01 RX ADMIN — WARFARIN SODIUM 7.5 MG: 7.5 TABLET ORAL at 17:43

## 2017-01-01 RX ADMIN — ACETAMINOPHEN 650 MG: 325 TABLET ORAL at 17:42

## 2017-01-01 RX ADMIN — QUETIAPINE FUMARATE 25 MG: 25 TABLET, FILM COATED ORAL at 23:07

## 2017-01-01 RX ADMIN — METOPROLOL TARTRATE 50 MG: 50 TABLET ORAL at 08:01

## 2017-01-01 RX ADMIN — LEVETIRACETAM 1000 MG: 1000 INJECTION, SOLUTION INTRAVENOUS at 23:47

## 2017-01-01 RX ADMIN — ASPIRIN 81 MG: 81 TABLET, CHEWABLE ORAL at 13:15

## 2017-01-01 RX ADMIN — INSULIN DETEMIR 10 UNITS: 100 INJECTION, SOLUTION SUBCUTANEOUS at 23:48

## 2017-01-01 RX ADMIN — AMPICILLIN SODIUM 500 MG: 500 INJECTION, POWDER, FOR SOLUTION INTRAMUSCULAR; INTRAVENOUS at 05:00

## 2017-01-01 RX ADMIN — SODIUM CHLORIDE 100 ML/HR: 9 INJECTION, SOLUTION INTRAVENOUS at 10:01

## 2017-01-01 RX ADMIN — VALPROATE SODIUM 750 MG: 100 INJECTION, SOLUTION INTRAVENOUS at 02:27

## 2017-01-01 RX ADMIN — DIAZEPAM 2.5 MG: 5 INJECTION, SOLUTION INTRAMUSCULAR; INTRAVENOUS at 06:56

## 2017-01-01 RX ADMIN — DIAZEPAM 2.5 MG: 5 INJECTION, SOLUTION INTRAMUSCULAR; INTRAVENOUS at 16:27

## 2017-01-01 RX ADMIN — MAGNESIUM SULFATE HEPTAHYDRATE 1 G: 1 INJECTION, SOLUTION INTRAVENOUS at 07:26

## 2017-01-01 RX ADMIN — AMPICILLIN SODIUM 500 MG: 500 INJECTION, POWDER, FOR SOLUTION INTRAMUSCULAR; INTRAVENOUS at 00:35

## 2017-01-01 RX ADMIN — INSULIN ASPART 8 UNITS: 100 INJECTION, SOLUTION INTRAVENOUS; SUBCUTANEOUS at 12:17

## 2017-01-01 RX ADMIN — INSULIN ASPART 2 UNITS: 100 INJECTION, SOLUTION INTRAVENOUS; SUBCUTANEOUS at 12:02

## 2017-01-01 RX ADMIN — INSULIN ASPART 4 UNITS: 100 INJECTION, SOLUTION INTRAVENOUS; SUBCUTANEOUS at 17:14

## 2017-01-01 RX ADMIN — VALPROATE SODIUM 750 MG: 100 INJECTION, SOLUTION INTRAVENOUS at 17:29

## 2017-01-01 RX ADMIN — POTASSIUM CHLORIDE 10 MEQ: 10 INJECTION, SOLUTION INTRAVENOUS at 18:57

## 2017-01-01 RX ADMIN — NYSTATIN: 100000 OINTMENT TOPICAL at 20:38

## 2017-01-01 RX ADMIN — METOPROLOL TARTRATE 2.5 MG: 5 INJECTION INTRAVENOUS at 17:52

## 2017-01-01 RX ADMIN — DIGOXIN 125 MCG: 125 TABLET ORAL at 11:27

## 2017-01-01 RX ADMIN — DIGOXIN 125 MCG: 125 TABLET ORAL at 12:24

## 2017-01-01 RX ADMIN — POTASSIUM CHLORIDE 10 MEQ: 10 INJECTION, SOLUTION INTRAVENOUS at 10:32

## 2017-01-01 RX ADMIN — AMPICILLIN SODIUM 500 MG: 500 INJECTION, POWDER, FOR SOLUTION INTRAMUSCULAR; INTRAVENOUS at 09:57

## 2017-01-01 RX ADMIN — POTASSIUM CHLORIDE 10 MEQ: 10 INJECTION, SOLUTION INTRAVENOUS at 09:15

## 2017-01-01 RX ADMIN — QUETIAPINE FUMARATE 12.5 MG: 25 TABLET, FILM COATED ORAL at 22:18

## 2017-01-01 RX ADMIN — INSULIN ASPART 2 UNITS: 100 INJECTION, SOLUTION INTRAVENOUS; SUBCUTANEOUS at 11:32

## 2017-01-01 RX ADMIN — VALPROATE SODIUM 750 MG: 100 INJECTION, SOLUTION INTRAVENOUS at 09:44

## 2017-01-01 RX ADMIN — QUETIAPINE FUMARATE 25 MG: 25 TABLET, FILM COATED ORAL at 17:29

## 2017-01-01 RX ADMIN — DIGOXIN 125 MCG: 125 TABLET ORAL at 12:17

## 2017-01-01 RX ADMIN — AMPICILLIN SODIUM 500 MG: 500 INJECTION, POWDER, FOR SOLUTION INTRAMUSCULAR; INTRAVENOUS at 16:21

## 2017-01-01 RX ADMIN — METOPROLOL TARTRATE 50 MG: 50 TABLET ORAL at 06:56

## 2017-01-01 RX ADMIN — INSULIN ASPART 6 UNITS: 100 INJECTION, SOLUTION INTRAVENOUS; SUBCUTANEOUS at 17:39

## 2017-01-01 RX ADMIN — DIGOXIN 125 MCG: 125 TABLET ORAL at 13:42

## 2017-01-01 RX ADMIN — SODIUM CHLORIDE, POTASSIUM CHLORIDE, SODIUM LACTATE AND CALCIUM CHLORIDE: 600; 310; 30; 20 INJECTION, SOLUTION INTRAVENOUS at 14:30

## 2017-01-01 RX ADMIN — HALOPERIDOL LACTATE 4 MG: 5 INJECTION, SOLUTION INTRAMUSCULAR at 19:08

## 2017-01-01 RX ADMIN — INSULIN ASPART 4 UNITS: 100 INJECTION, SOLUTION INTRAVENOUS; SUBCUTANEOUS at 22:03

## 2017-01-01 RX ADMIN — POTASSIUM CHLORIDE 10 MEQ: 10 INJECTION, SOLUTION INTRAVENOUS at 15:47

## 2017-01-01 RX ADMIN — SODIUM CHLORIDE 75 ML/HR: 4.5 INJECTION, SOLUTION INTRAVENOUS at 05:35

## 2017-01-01 RX ADMIN — SODIUM CHLORIDE 100 ML/HR: 9 INJECTION, SOLUTION INTRAVENOUS at 10:00

## 2017-01-01 RX ADMIN — POTASSIUM CHLORIDE 10 MEQ: 7.46 INJECTION, SOLUTION INTRAVENOUS at 14:03

## 2017-01-01 RX ADMIN — METOPROLOL TARTRATE 50 MG: 50 TABLET ORAL at 21:55

## 2017-01-01 RX ADMIN — ATORVASTATIN CALCIUM 80 MG: 80 TABLET, FILM COATED ORAL at 22:18

## 2017-01-01 RX ADMIN — POTASSIUM CHLORIDE 40 MEQ: 750 CAPSULE, EXTENDED RELEASE ORAL at 11:59

## 2017-01-01 RX ADMIN — VALPROIC ACID 750 MG: 250 SOLUTION ORAL at 06:20

## 2017-01-01 RX ADMIN — AMPICILLIN SODIUM 500 MG: 500 INJECTION, POWDER, FOR SOLUTION INTRAMUSCULAR; INTRAVENOUS at 09:27

## 2017-01-01 RX ADMIN — INSULIN DETEMIR 20 UNITS: 100 INJECTION, SOLUTION SUBCUTANEOUS at 15:24

## 2017-01-01 RX ADMIN — SERTRALINE 50 MG: 50 TABLET, FILM COATED ORAL at 21:40

## 2017-01-01 RX ADMIN — POTASSIUM CHLORIDE 40 MEQ: 1.5 POWDER, FOR SOLUTION ORAL at 14:34

## 2017-01-01 RX ADMIN — ATORVASTATIN CALCIUM 80 MG: 80 TABLET, FILM COATED ORAL at 23:07

## 2017-01-01 RX ADMIN — CEFAZOLIN SODIUM 1 G: 1 INJECTION, SOLUTION INTRAVENOUS at 14:28

## 2017-01-01 RX ADMIN — CEFAZOLIN SODIUM 1 G: 1 INJECTION, SOLUTION INTRAVENOUS at 14:31

## 2017-01-01 RX ADMIN — METOPROLOL TARTRATE 50 MG: 50 TABLET ORAL at 13:42

## 2017-01-01 RX ADMIN — QUETIAPINE FUMARATE 25 MG: 25 TABLET, FILM COATED ORAL at 08:15

## 2017-01-01 RX ADMIN — SODIUM CHLORIDE 75 ML/HR: 4.5 INJECTION, SOLUTION INTRAVENOUS at 17:56

## 2017-01-01 RX ADMIN — VALPROIC ACID 750 MG: 250 SOLUTION ORAL at 00:00

## 2017-01-01 RX ADMIN — INSULIN ASPART 10 UNITS: 100 INJECTION, SOLUTION INTRAVENOUS; SUBCUTANEOUS at 20:55

## 2017-01-01 RX ADMIN — LEVETIRACETAM 1500 MG: 100 SOLUTION ORAL at 17:42

## 2017-01-01 RX ADMIN — PNEUMOCOCCAL VACCINE POLYVALENT 0.5 ML
25; 25; 25; 25; 25; 25; 25; 25; 25; 25; 25; 25; 25; 25; 25; 25; 25; 25; 25; 25; 25; 25; 25 INJECTION, SOLUTION INTRAMUSCULAR; SUBCUTANEOUS at 16:25

## 2017-01-01 RX ADMIN — LEVETIRACETAM 1500 MG: 15 INJECTION INTRAVENOUS at 21:16

## 2017-01-01 RX ADMIN — POTASSIUM CHLORIDE 10 MEQ: 10 INJECTION, SOLUTION INTRAVENOUS at 22:30

## 2017-01-01 RX ADMIN — INSULIN ASPART 2 UNITS: 100 INJECTION, SOLUTION INTRAVENOUS; SUBCUTANEOUS at 22:18

## 2017-01-01 RX ADMIN — ATORVASTATIN CALCIUM 20 MG: 20 TABLET, FILM COATED ORAL at 09:42

## 2017-01-01 RX ADMIN — POTASSIUM CHLORIDE 10 MEQ: 10 INJECTION, SOLUTION INTRAVENOUS at 17:53

## 2017-01-01 RX ADMIN — INSULIN ASPART 2 UNITS: 100 INJECTION, SOLUTION INTRAVENOUS; SUBCUTANEOUS at 09:17

## 2017-01-01 RX ADMIN — ACETAMINOPHEN 650 MG: 325 TABLET ORAL at 15:57

## 2017-01-01 RX ADMIN — VALPROIC ACID 750 MG: 250 SOLUTION ORAL at 17:42

## 2017-01-01 RX ADMIN — DIGOXIN 125 MCG: 125 TABLET ORAL at 13:06

## 2017-01-01 RX ADMIN — AMPICILLIN SODIUM 500 MG: 500 INJECTION, POWDER, FOR SOLUTION INTRAMUSCULAR; INTRAVENOUS at 12:32

## 2017-01-01 RX ADMIN — DIGOXIN 125 MCG: 125 TABLET ORAL at 10:02

## 2017-01-01 RX ADMIN — METOPROLOL TARTRATE 50 MG: 50 TABLET ORAL at 22:18

## 2017-01-01 RX ADMIN — VALPROATE SODIUM 750 MG: 100 INJECTION, SOLUTION INTRAVENOUS at 01:19

## 2017-01-01 RX ADMIN — METOPROLOL TARTRATE 50 MG: 50 TABLET ORAL at 05:48

## 2017-01-01 RX ADMIN — ATORVASTATIN CALCIUM 80 MG: 80 TABLET, FILM COATED ORAL at 21:02

## 2017-01-01 RX ADMIN — INSULIN ASPART 2 UNITS: 100 INJECTION, SOLUTION INTRAVENOUS; SUBCUTANEOUS at 23:46

## 2017-01-01 RX ADMIN — AMPICILLIN SODIUM 500 MG: 500 INJECTION, POWDER, FOR SOLUTION INTRAMUSCULAR; INTRAVENOUS at 11:12

## 2017-01-01 RX ADMIN — ASPIRIN 81 MG: 81 TABLET, CHEWABLE ORAL at 09:19

## 2017-01-01 RX ADMIN — LORAZEPAM 0.5 MG: 2 INJECTION, SOLUTION INTRAMUSCULAR; INTRAVENOUS at 21:30

## 2017-01-01 RX ADMIN — SODIUM CHLORIDE 100 ML/HR: 9 INJECTION, SOLUTION INTRAVENOUS at 19:00

## 2017-01-01 RX ADMIN — ASPIRIN 81 MG: 81 TABLET, CHEWABLE ORAL at 09:44

## 2017-01-01 RX ADMIN — POTASSIUM CHLORIDE 40 MEQ: 1.5 POWDER, FOR SOLUTION ORAL at 13:21

## 2017-01-01 RX ADMIN — HALOPERIDOL 1 MG: 1 TABLET ORAL at 11:20

## 2017-01-01 RX ADMIN — METOPROLOL TARTRATE 50 MG: 50 TABLET ORAL at 16:26

## 2017-01-01 RX ADMIN — INSULIN ASPART 2 UNITS: 100 INJECTION, SOLUTION INTRAVENOUS; SUBCUTANEOUS at 23:27

## 2017-01-01 RX ADMIN — INSULIN ASPART 4 UNITS: 100 INJECTION, SOLUTION INTRAVENOUS; SUBCUTANEOUS at 21:34

## 2017-01-01 RX ADMIN — ATORVASTATIN CALCIUM 20 MG: 20 TABLET, FILM COATED ORAL at 08:12

## 2017-01-01 RX ADMIN — DIAZEPAM 2.5 MG: 5 INJECTION, SOLUTION INTRAMUSCULAR; INTRAVENOUS at 09:45

## 2017-01-01 RX ADMIN — VALPROATE SODIUM 750 MG: 100 INJECTION, SOLUTION INTRAVENOUS at 01:54

## 2017-01-01 RX ADMIN — POTASSIUM CHLORIDE 10 MEQ: 10 INJECTION, SOLUTION INTRAVENOUS at 18:24

## 2017-01-01 RX ADMIN — ASPIRIN 81 MG: 81 TABLET, CHEWABLE ORAL at 08:54

## 2017-01-01 RX ADMIN — CALCIUM ACETATE MONOHYDRATE AND ALUMINUM SULFATE TETRADECAHYDRATE 1 PACKET: 952; 1347 POWDER, FOR SOLUTION TOPICAL at 21:51

## 2017-01-01 RX ADMIN — METOPROLOL TARTRATE 5 MG: 5 INJECTION INTRAVENOUS at 20:48

## 2017-01-01 RX ADMIN — SERTRALINE 50 MG: 50 TABLET, FILM COATED ORAL at 20:32

## 2017-01-01 RX ADMIN — POTASSIUM CHLORIDE, DEXTROSE MONOHYDRATE AND SODIUM CHLORIDE 75 ML/HR: 300; 5; 900 INJECTION, SOLUTION INTRAVENOUS at 22:30

## 2017-01-01 RX ADMIN — INSULIN ASPART 4 UNITS: 100 INJECTION, SOLUTION INTRAVENOUS; SUBCUTANEOUS at 17:28

## 2017-01-01 RX ADMIN — WARFARIN SODIUM 7.5 MG: 7.5 TABLET ORAL at 17:50

## 2017-01-01 RX ADMIN — VALPROIC ACID 750 MG: 250 SOLUTION ORAL at 23:06

## 2017-01-01 RX ADMIN — LEVETIRACETAM 1000 MG: 1000 INJECTION, SOLUTION INTRAVENOUS at 09:17

## 2017-01-01 RX ADMIN — INSULIN ASPART 2 UNITS: 100 INJECTION, SOLUTION INTRAVENOUS; SUBCUTANEOUS at 21:58

## 2017-01-01 RX ADMIN — POTASSIUM CHLORIDE 40 MEQ: 750 CAPSULE, EXTENDED RELEASE ORAL at 08:13

## 2017-01-01 RX ADMIN — METOPROLOL TARTRATE 5 MG: 5 INJECTION INTRAVENOUS at 03:36

## 2017-01-01 RX ADMIN — QUETIAPINE FUMARATE 25 MG: 25 TABLET, FILM COATED ORAL at 21:25

## 2017-01-01 RX ADMIN — LEVETIRACETAM 3000 MG: 100 INJECTION, SOLUTION INTRAVENOUS at 17:47

## 2017-01-01 RX ADMIN — CALCIUM ACETATE MONOHYDRATE AND ALUMINUM SULFATE TETRADECAHYDRATE 1 PACKET: 952; 1347 POWDER, FOR SOLUTION TOPICAL at 16:30

## 2017-01-01 RX ADMIN — POTASSIUM CHLORIDE 40 MEQ: 1.5 POWDER, FOR SOLUTION ORAL at 18:30

## 2017-01-01 RX ADMIN — AMPICILLIN SODIUM 500 MG: 500 INJECTION, POWDER, FOR SOLUTION INTRAMUSCULAR; INTRAVENOUS at 15:59

## 2017-01-01 RX ADMIN — METOPROLOL TARTRATE 50 MG: 50 TABLET ORAL at 10:02

## 2017-01-01 RX ADMIN — PANTOPRAZOLE SODIUM 80 MG: 40 INJECTION, POWDER, FOR SOLUTION INTRAVENOUS at 13:48

## 2017-01-01 RX ADMIN — HALOPERIDOL LACTATE 1 MG: 5 INJECTION, SOLUTION INTRAMUSCULAR at 00:44

## 2017-01-01 RX ADMIN — INSULIN ASPART 12 UNITS: 100 INJECTION, SOLUTION INTRAVENOUS; SUBCUTANEOUS at 17:32

## 2017-01-01 RX ADMIN — SODIUM CHLORIDE 75 ML/HR: 9 INJECTION, SOLUTION INTRAVENOUS at 19:45

## 2017-01-01 RX ADMIN — POTASSIUM CHLORIDE 40 MEQ: 1.5 POWDER, FOR SOLUTION ORAL at 17:28

## 2017-01-01 RX ADMIN — VANCOMYCIN HYDROCHLORIDE 1750 MG: 1 INJECTION, POWDER, LYOPHILIZED, FOR SOLUTION INTRAVENOUS at 11:17

## 2017-01-01 RX ADMIN — METOPROLOL TARTRATE 50 MG: 50 TABLET ORAL at 23:07

## 2017-01-01 RX ADMIN — AMPICILLIN SODIUM 500 MG: 500 INJECTION, POWDER, FOR SOLUTION INTRAMUSCULAR; INTRAVENOUS at 10:41

## 2017-01-01 RX ADMIN — METOPROLOL TARTRATE 50 MG: 50 TABLET ORAL at 13:16

## 2017-01-01 RX ADMIN — ATORVASTATIN CALCIUM 80 MG: 80 TABLET, FILM COATED ORAL at 21:15

## 2017-01-01 RX ADMIN — HALOPERIDOL 2 MG: 5 INJECTION INTRAMUSCULAR at 14:30

## 2017-01-01 RX ADMIN — AMPICILLIN SODIUM 500 MG: 500 INJECTION, POWDER, FOR SOLUTION INTRAMUSCULAR; INTRAVENOUS at 23:38

## 2017-01-01 RX ADMIN — INSULIN ASPART 6 UNITS: 100 INJECTION, SOLUTION INTRAVENOUS; SUBCUTANEOUS at 08:26

## 2017-01-01 RX ADMIN — INSULIN ASPART 12 UNITS: 100 INJECTION, SOLUTION INTRAVENOUS; SUBCUTANEOUS at 21:21

## 2017-01-01 RX ADMIN — ASPIRIN 81 MG: 81 TABLET, CHEWABLE ORAL at 08:15

## 2017-01-01 RX ADMIN — HALOPERIDOL LACTATE 4 MG: 5 INJECTION, SOLUTION INTRAMUSCULAR at 05:32

## 2017-01-01 RX ADMIN — DIGOXIN 125 MCG: 125 TABLET ORAL at 13:14

## 2017-01-01 RX ADMIN — POTASSIUM CHLORIDE 10 MEQ: 10 INJECTION, SOLUTION INTRAVENOUS at 11:16

## 2017-01-01 RX ADMIN — SERTRALINE 50 MG: 50 TABLET, FILM COATED ORAL at 21:26

## 2017-01-01 RX ADMIN — QUETIAPINE FUMARATE 25 MG: 25 TABLET, FILM COATED ORAL at 09:51

## 2017-01-01 RX ADMIN — POTASSIUM CHLORIDE 10 MEQ: 10 INJECTION, SOLUTION INTRAVENOUS at 12:04

## 2017-01-01 RX ADMIN — POTASSIUM CHLORIDE 10 MEQ: 10 INJECTION, SOLUTION INTRAVENOUS at 09:50

## 2017-01-01 RX ADMIN — NYSTATIN: 100000 OINTMENT TOPICAL at 21:51

## 2017-01-01 RX ADMIN — POTASSIUM CHLORIDE 10 MEQ: 7.46 INJECTION, SOLUTION INTRAVENOUS at 10:00

## 2017-01-01 RX ADMIN — METOPROLOL TARTRATE 50 MG: 50 TABLET ORAL at 05:57

## 2017-01-01 RX ADMIN — POTASSIUM CHLORIDE 10 MEQ: 10 INJECTION, SOLUTION INTRAVENOUS at 06:58

## 2017-01-01 RX ADMIN — LEVETIRACETAM 1500 MG: 15 INJECTION INTRAVENOUS at 10:33

## 2017-01-01 RX ADMIN — METOPROLOL TARTRATE 50 MG: 50 TABLET ORAL at 06:18

## 2017-01-01 RX ADMIN — QUETIAPINE FUMARATE 25 MG: 25 TABLET, FILM COATED ORAL at 10:42

## 2017-01-01 RX ADMIN — INSULIN ASPART 4 UNITS: 100 INJECTION, SOLUTION INTRAVENOUS; SUBCUTANEOUS at 11:29

## 2017-01-01 RX ADMIN — NYSTATIN: 100000 OINTMENT TOPICAL at 21:45

## 2017-01-01 RX ADMIN — CALCIUM ACETATE MONOHYDRATE AND ALUMINUM SULFATE TETRADECAHYDRATE 1 PACKET: 952; 1347 POWDER, FOR SOLUTION TOPICAL at 14:26

## 2017-01-01 RX ADMIN — PROPOFOL 100 MCG/KG/MIN: 10 INJECTION, EMULSION INTRAVENOUS at 14:30

## 2017-01-01 RX ADMIN — DIGOXIN 125 MCG: 125 TABLET ORAL at 17:43

## 2017-01-01 RX ADMIN — INSULIN ASPART 6 UNITS: 100 INJECTION, SOLUTION INTRAVENOUS; SUBCUTANEOUS at 12:24

## 2017-01-01 RX ADMIN — CYANOCOBALAMIN 1000 MCG: 1000 INJECTION, SOLUTION INTRAMUSCULAR; SUBCUTANEOUS at 10:39

## 2017-01-01 RX ADMIN — ATORVASTATIN CALCIUM 20 MG: 20 TABLET, FILM COATED ORAL at 10:00

## 2017-01-01 RX ADMIN — CEFTRIAXONE SODIUM 1 G: 1 INJECTION, SOLUTION INTRAVENOUS at 00:22

## 2017-01-01 RX ADMIN — QUETIAPINE FUMARATE 25 MG: 25 TABLET, FILM COATED ORAL at 09:18

## 2017-01-01 RX ADMIN — ZIPRASIDONE MESYLATE 20 MG: 20 INJECTION, POWDER, LYOPHILIZED, FOR SOLUTION INTRAMUSCULAR at 15:51

## 2017-01-01 RX ADMIN — INSULIN ASPART 6 UNITS: 100 INJECTION, SOLUTION INTRAVENOUS; SUBCUTANEOUS at 08:20

## 2017-01-01 RX ADMIN — NYSTATIN: 100000 OINTMENT TOPICAL at 08:19

## 2017-01-01 RX ADMIN — INSULIN ASPART 4 UNITS: 100 INJECTION, SOLUTION INTRAVENOUS; SUBCUTANEOUS at 12:33

## 2017-01-01 RX ADMIN — QUETIAPINE FUMARATE 25 MG: 25 TABLET, FILM COATED ORAL at 16:34

## 2017-01-01 RX ADMIN — CEFTRIAXONE SODIUM 1 G: 1 INJECTION, SOLUTION INTRAVENOUS at 23:27

## 2017-01-01 RX ADMIN — INSULIN ASPART 4 UNITS: 100 INJECTION, SOLUTION INTRAVENOUS; SUBCUTANEOUS at 16:58

## 2017-01-01 RX ADMIN — AMPICILLIN SODIUM 500 MG: 500 INJECTION, POWDER, FOR SOLUTION INTRAMUSCULAR; INTRAVENOUS at 04:54

## 2017-01-01 RX ADMIN — ONDANSETRON 4 MG: 2 INJECTION INTRAMUSCULAR; INTRAVENOUS at 22:20

## 2017-01-01 RX ADMIN — POTASSIUM CHLORIDE 40 MEQ: 750 CAPSULE, EXTENDED RELEASE ORAL at 10:56

## 2017-01-01 RX ADMIN — INSULIN DETEMIR 20 UNITS: 100 INJECTION, SOLUTION SUBCUTANEOUS at 08:23

## 2017-01-01 RX ADMIN — SODIUM CHLORIDE 100 ML/HR: 9 INJECTION, SOLUTION INTRAVENOUS at 20:32

## 2017-01-01 RX ADMIN — NYSTATIN: 100000 OINTMENT TOPICAL at 09:05

## 2017-01-01 RX ADMIN — POTASSIUM CHLORIDE 10 MEQ: 7.46 INJECTION, SOLUTION INTRAVENOUS at 12:41

## 2017-01-01 RX ADMIN — INSULIN ASPART 10 UNITS: 100 INJECTION, SOLUTION INTRAVENOUS; SUBCUTANEOUS at 07:02

## 2017-01-01 RX ADMIN — ASPIRIN 81 MG: 81 TABLET, CHEWABLE ORAL at 09:51

## 2017-01-01 RX ADMIN — LEVETIRACETAM 1500 MG: 15 INJECTION INTRAVENOUS at 21:25

## 2017-01-01 RX ADMIN — LEVETIRACETAM 1500 MG: 15 INJECTION INTRAVENOUS at 10:01

## 2017-01-01 RX ADMIN — INSULIN ASPART 2 UNITS: 100 INJECTION, SOLUTION INTRAVENOUS; SUBCUTANEOUS at 22:48

## 2017-01-01 RX ADMIN — ACETAMINOPHEN 650 MG: 325 TABLET ORAL at 22:34

## 2017-01-01 RX ADMIN — ATORVASTATIN CALCIUM 20 MG: 20 TABLET, FILM COATED ORAL at 13:16

## 2017-01-01 RX ADMIN — METOPROLOL TARTRATE 50 MG: 50 TABLET ORAL at 21:50

## 2017-01-01 RX ADMIN — VALPROATE SODIUM 750 MG: 100 INJECTION, SOLUTION INTRAVENOUS at 02:00

## 2017-01-01 RX ADMIN — ATORVASTATIN CALCIUM 80 MG: 80 TABLET, FILM COATED ORAL at 21:45

## 2017-01-01 RX ADMIN — INSULIN ASPART 4 UNITS: 100 INJECTION, SOLUTION INTRAVENOUS; SUBCUTANEOUS at 18:30

## 2017-01-01 RX ADMIN — AMPICILLIN SODIUM 500 MG: 500 INJECTION, POWDER, FOR SOLUTION INTRAMUSCULAR; INTRAVENOUS at 05:39

## 2017-01-01 RX ADMIN — CALCIUM ACETATE MONOHYDRATE AND ALUMINUM SULFATE TETRADECAHYDRATE 1 PACKET: 952; 1347 POWDER, FOR SOLUTION TOPICAL at 05:57

## 2017-01-01 RX ADMIN — CALCIUM ACETATE MONOHYDRATE AND ALUMINUM SULFATE TETRADECAHYDRATE 1 PACKET: 952; 1347 POWDER, FOR SOLUTION TOPICAL at 05:48

## 2017-01-01 RX ADMIN — INSULIN DETEMIR 10 UNITS: 100 INJECTION, SOLUTION SUBCUTANEOUS at 22:02

## 2017-01-01 RX ADMIN — AMPICILLIN SODIUM 500 MG: 500 INJECTION, POWDER, FOR SOLUTION INTRAMUSCULAR; INTRAVENOUS at 21:34

## 2017-01-01 RX ADMIN — MICONAZOLE NITRATE: 2 POWDER TOPICAL at 20:58

## 2017-01-01 RX ADMIN — DIGOXIN 125 MCG: 125 TABLET ORAL at 11:32

## 2017-01-01 RX ADMIN — AMPICILLIN SODIUM 500 MG: 500 INJECTION, POWDER, FOR SOLUTION INTRAMUSCULAR; INTRAVENOUS at 12:29

## 2017-01-01 RX ADMIN — NYSTATIN: 100000 OINTMENT TOPICAL at 00:30

## 2017-01-01 RX ADMIN — POTASSIUM CHLORIDE 10 MEQ: 10 INJECTION, SOLUTION INTRAVENOUS at 00:25

## 2017-01-01 RX ADMIN — VALPROATE SODIUM 750 MG: 100 INJECTION, SOLUTION INTRAVENOUS at 18:30

## 2017-01-01 RX ADMIN — QUETIAPINE FUMARATE 25 MG: 25 TABLET, FILM COATED ORAL at 16:25

## 2017-01-01 RX ADMIN — INSULIN ASPART 2 UNITS: 100 INJECTION, SOLUTION INTRAVENOUS; SUBCUTANEOUS at 17:11

## 2017-01-01 RX ADMIN — METOPROLOL TARTRATE 50 MG: 50 TABLET ORAL at 20:07

## 2017-01-01 RX ADMIN — METOPROLOL TARTRATE 50 MG: 50 TABLET ORAL at 21:02

## 2017-01-01 RX ADMIN — AMPICILLIN SODIUM 500 MG: 500 INJECTION, POWDER, FOR SOLUTION INTRAMUSCULAR; INTRAVENOUS at 04:12

## 2017-01-01 RX ADMIN — POTASSIUM CHLORIDE 10 MEQ: 10 INJECTION, SOLUTION INTRAVENOUS at 13:19

## 2017-01-01 RX ADMIN — METOPROLOL TARTRATE 50 MG: 50 TABLET ORAL at 05:37

## 2017-01-01 RX ADMIN — AMPICILLIN SODIUM 500 MG: 500 INJECTION, POWDER, FOR SOLUTION INTRAMUSCULAR; INTRAVENOUS at 17:33

## 2017-01-01 RX ADMIN — HALOPERIDOL 2 MG: 1 TABLET ORAL at 21:00

## 2017-01-01 RX ADMIN — LEVETIRACETAM 500 MG: 5 INJECTION INTRAVENOUS at 21:58

## 2017-01-01 RX ADMIN — AMPICILLIN SODIUM 500 MG: 500 INJECTION, POWDER, FOR SOLUTION INTRAMUSCULAR; INTRAVENOUS at 17:12

## 2017-01-01 RX ADMIN — INSULIN ASPART 8 UNITS: 100 INJECTION, SOLUTION INTRAVENOUS; SUBCUTANEOUS at 12:54

## 2017-01-01 RX ADMIN — POTASSIUM CHLORIDE 10 MEQ: 10 INJECTION, SOLUTION INTRAVENOUS at 12:19

## 2017-01-01 RX ADMIN — MAGNESIUM SULFATE HEPTAHYDRATE 1 G: 1 INJECTION, SOLUTION INTRAVENOUS at 06:01

## 2017-01-01 RX ADMIN — POTASSIUM CHLORIDE 40 MEQ: 1.5 POWDER, FOR SOLUTION ORAL at 09:36

## 2017-01-01 RX ADMIN — PROTHROMBIN, COAGULATION FACTOR VII HUMAN, COAGULATION FACTOR IX HUMAN, COAGULATION FACTOR X HUMAN, PROTEIN C, PROTEIN S HUMAN, AND WATER 4220 UNITS: KIT at 14:59

## 2017-01-01 RX ADMIN — AMPICILLIN SODIUM 500 MG: 500 INJECTION, POWDER, FOR SOLUTION INTRAMUSCULAR; INTRAVENOUS at 18:12

## 2017-01-01 RX ADMIN — INSULIN ASPART 4 UNITS: 100 INJECTION, SOLUTION INTRAVENOUS; SUBCUTANEOUS at 21:49

## 2017-01-01 RX ADMIN — INSULIN ASPART 5 UNITS: 100 INJECTION, SOLUTION INTRAVENOUS; SUBCUTANEOUS at 08:48

## 2017-01-01 RX ADMIN — PERFLUTREN 3 ML: 6.52 INJECTION, SUSPENSION INTRAVENOUS at 09:03

## 2017-01-01 RX ADMIN — VALPROATE SODIUM 750 MG: 100 INJECTION, SOLUTION INTRAVENOUS at 17:39

## 2017-01-01 RX ADMIN — METOPROLOL TARTRATE 50 MG: 50 TABLET ORAL at 17:50

## 2017-01-01 RX ADMIN — DIGOXIN 125 MCG: 125 TABLET ORAL at 12:15

## 2017-01-01 RX ADMIN — INSULIN ASPART 4 UNITS: 100 INJECTION, SOLUTION INTRAVENOUS; SUBCUTANEOUS at 13:19

## 2017-01-01 RX ADMIN — INSULIN ASPART 5 UNITS: 100 INJECTION, SOLUTION INTRAVENOUS; SUBCUTANEOUS at 08:21

## 2017-01-01 RX ADMIN — QUETIAPINE FUMARATE 25 MG: 25 TABLET, FILM COATED ORAL at 17:42

## 2017-01-01 RX ADMIN — CALCIUM ACETATE MONOHYDRATE AND ALUMINUM SULFATE TETRADECAHYDRATE 1 PACKET: 952; 1347 POWDER, FOR SOLUTION TOPICAL at 13:47

## 2017-01-01 RX ADMIN — POTASSIUM CHLORIDE 40 MEQ: 750 CAPSULE, EXTENDED RELEASE ORAL at 15:24

## 2017-01-01 RX ADMIN — METOPROLOL TARTRATE 50 MG: 50 TABLET ORAL at 08:06

## 2017-01-01 RX ADMIN — INSULIN DETEMIR 10 UNITS: 100 INJECTION, SOLUTION SUBCUTANEOUS at 21:58

## 2017-01-01 RX ADMIN — LEVETIRACETAM 500 MG: 5 INJECTION INTRAVENOUS at 10:07

## 2017-01-01 RX ADMIN — CALCIUM ACETATE MONOHYDRATE AND ALUMINUM SULFATE TETRADECAHYDRATE 1 PACKET: 952; 1347 POWDER, FOR SOLUTION TOPICAL at 05:28

## 2017-01-01 RX ADMIN — ASPIRIN 81 MG: 81 TABLET, CHEWABLE ORAL at 08:26

## 2017-01-01 RX ADMIN — POTASSIUM CHLORIDE 10 MEQ: 10 INJECTION, SOLUTION INTRAVENOUS at 08:13

## 2017-01-01 RX ADMIN — POTASSIUM CHLORIDE 10 MEQ: 10 INJECTION, SOLUTION INTRAVENOUS at 21:30

## 2017-01-01 RX ADMIN — POTASSIUM CHLORIDE 40 MEQ: 750 CAPSULE, EXTENDED RELEASE ORAL at 11:44

## 2017-01-01 RX ADMIN — INSULIN ASPART 8 UNITS: 100 INJECTION, SOLUTION INTRAVENOUS; SUBCUTANEOUS at 02:39

## 2017-01-01 RX ADMIN — NYSTATIN: 100000 OINTMENT TOPICAL at 08:16

## 2017-01-01 RX ADMIN — NYSTATIN: 100000 OINTMENT TOPICAL at 09:19

## 2017-01-01 RX ADMIN — INSULIN ASPART 4 UNITS: 100 INJECTION, SOLUTION INTRAVENOUS; SUBCUTANEOUS at 17:44

## 2017-01-01 RX ADMIN — ATORVASTATIN CALCIUM 20 MG: 20 TABLET, FILM COATED ORAL at 18:59

## 2017-01-01 RX ADMIN — QUETIAPINE FUMARATE 25 MG: 25 TABLET, FILM COATED ORAL at 08:47

## 2017-01-01 RX ADMIN — INSULIN ASPART 6 UNITS: 100 INJECTION, SOLUTION INTRAVENOUS; SUBCUTANEOUS at 21:16

## 2017-01-01 RX ADMIN — MICONAZOLE NITRATE: 2 POWDER TOPICAL at 21:44

## 2017-01-01 RX ADMIN — NYSTATIN: 100000 OINTMENT TOPICAL at 08:27

## 2017-01-01 RX ADMIN — QUETIAPINE FUMARATE 25 MG: 25 TABLET, FILM COATED ORAL at 21:50

## 2017-01-01 RX ADMIN — CEFTRIAXONE SODIUM 1 G: 1 INJECTION, SOLUTION INTRAVENOUS at 19:49

## 2017-01-01 RX ADMIN — QUETIAPINE FUMARATE 25 MG: 25 TABLET, FILM COATED ORAL at 08:21

## 2017-01-01 RX ADMIN — QUETIAPINE FUMARATE 25 MG: 25 TABLET, FILM COATED ORAL at 21:10

## 2017-01-01 RX ADMIN — INSULIN DETEMIR 20 UNITS: 100 INJECTION, SOLUTION SUBCUTANEOUS at 21:26

## 2017-01-01 RX ADMIN — VALPROIC ACID 750 MG: 250 SOLUTION ORAL at 14:33

## 2017-01-01 RX ADMIN — METOPROLOL TARTRATE 50 MG: 50 TABLET ORAL at 06:53

## 2017-01-01 RX ADMIN — METOPROLOL TARTRATE 50 MG: 50 TABLET ORAL at 21:17

## 2017-01-01 RX ADMIN — MICONAZOLE NITRATE: 2 POWDER TOPICAL at 08:23

## 2017-01-01 RX ADMIN — VALPROIC ACID 750 MG: 250 SOLUTION ORAL at 05:37

## 2017-01-01 RX ADMIN — QUETIAPINE FUMARATE 12.5 MG: 25 TABLET, FILM COATED ORAL at 08:54

## 2017-01-01 RX ADMIN — POTASSIUM & SODIUM PHOSPHATES POWDER PACK 280-160-250 MG 1 PACKET: 280-160-250 PACK at 09:08

## 2017-01-01 RX ADMIN — AMPICILLIN SODIUM 500 MG: 500 INJECTION, POWDER, FOR SOLUTION INTRAMUSCULAR; INTRAVENOUS at 06:22

## 2017-01-01 RX ADMIN — HALOPERIDOL LACTATE 2 MG: 5 INJECTION, SOLUTION INTRAMUSCULAR at 14:30

## 2017-01-01 RX ADMIN — QUETIAPINE FUMARATE 12.5 MG: 25 TABLET, FILM COATED ORAL at 17:38

## 2017-01-01 RX ADMIN — LEVETIRACETAM 1500 MG: 15 INJECTION INTRAVENOUS at 00:05

## 2017-01-01 RX ADMIN — METOPROLOL TARTRATE 50 MG: 50 TABLET ORAL at 13:45

## 2017-01-01 RX ADMIN — VALPROATE SODIUM 3000 MG: 100 INJECTION, SOLUTION INTRAVENOUS at 15:03

## 2017-01-01 RX ADMIN — VALPROATE SODIUM 750 MG: 100 INJECTION, SOLUTION INTRAVENOUS at 11:16

## 2017-01-01 RX ADMIN — SODIUM CHLORIDE 75 ML/HR: 9 INJECTION, SOLUTION INTRAVENOUS at 11:50

## 2017-01-01 RX ADMIN — SODIUM CHLORIDE 500 ML: 9 INJECTION, SOLUTION INTRAVENOUS at 11:43

## 2017-01-01 RX ADMIN — CALCIUM ACETATE MONOHYDRATE AND ALUMINUM SULFATE TETRADECAHYDRATE 1 PACKET: 952; 1347 POWDER, FOR SOLUTION TOPICAL at 13:00

## 2017-01-01 RX ADMIN — CALCIUM ACETATE MONOHYDRATE AND ALUMINUM SULFATE TETRADECAHYDRATE 1 PACKET: 952; 1347 POWDER, FOR SOLUTION TOPICAL at 21:46

## 2017-01-01 RX ADMIN — MICONAZOLE NITRATE: 2 POWDER TOPICAL at 08:48

## 2017-01-01 RX ADMIN — POTASSIUM CHLORIDE 10 MEQ: 7.46 INJECTION, SOLUTION INTRAVENOUS at 13:45

## 2017-01-01 RX ADMIN — POTASSIUM CHLORIDE 40 MEQ: 750 CAPSULE, EXTENDED RELEASE ORAL at 17:35

## 2017-01-01 RX ADMIN — GADOBENATE DIMEGLUMINE 17 ML: 529 INJECTION, SOLUTION INTRAVENOUS at 11:10

## 2017-01-01 RX ADMIN — METOPROLOL TARTRATE 50 MG: 50 TABLET ORAL at 17:35

## 2017-01-01 RX ADMIN — METOPROLOL TARTRATE 50 MG: 50 TABLET ORAL at 17:42

## 2017-01-01 RX ADMIN — ATORVASTATIN CALCIUM 20 MG: 20 TABLET, FILM COATED ORAL at 10:02

## 2017-01-01 RX ADMIN — POTASSIUM CHLORIDE 10 MEQ: 10 INJECTION, SOLUTION INTRAVENOUS at 17:43

## 2017-01-01 RX ADMIN — ASPIRIN 81 MG: 81 TABLET, CHEWABLE ORAL at 17:42

## 2017-01-01 RX ADMIN — POTASSIUM CHLORIDE 10 MEQ: 10 INJECTION, SOLUTION INTRAVENOUS at 14:32

## 2017-01-01 RX ADMIN — POTASSIUM CHLORIDE 10 MEQ: 7.46 INJECTION, SOLUTION INTRAVENOUS at 11:24

## 2017-01-01 RX ADMIN — VALPROATE SODIUM 750 MG: 100 INJECTION, SOLUTION INTRAVENOUS at 01:52

## 2017-01-01 RX ADMIN — INSULIN DETEMIR 10 UNITS: 100 INJECTION, SOLUTION SUBCUTANEOUS at 22:44

## 2017-01-01 RX ADMIN — INSULIN ASPART 4 UNITS: 100 INJECTION, SOLUTION INTRAVENOUS; SUBCUTANEOUS at 11:26

## 2017-01-01 RX ADMIN — DIAZEPAM 2.5 MG: 5 INJECTION, SOLUTION INTRAMUSCULAR; INTRAVENOUS at 22:52

## 2017-01-01 RX ADMIN — QUETIAPINE FUMARATE 25 MG: 25 TABLET, FILM COATED ORAL at 17:44

## 2017-01-01 RX ADMIN — SODIUM CHLORIDE 1000 ML: 9 INJECTION, SOLUTION INTRAVENOUS at 14:52

## 2017-01-01 RX ADMIN — INSULIN ASPART 2 UNITS: 100 INJECTION, SOLUTION INTRAVENOUS; SUBCUTANEOUS at 21:09

## 2017-01-01 RX ADMIN — ATORVASTATIN CALCIUM 20 MG: 20 TABLET, FILM COATED ORAL at 09:01

## 2017-01-01 RX ADMIN — INSULIN DETEMIR 20 UNITS: 100 INJECTION, SOLUTION SUBCUTANEOUS at 08:49

## 2017-01-01 RX ADMIN — DIGOXIN 125 MCG: 125 TABLET ORAL at 13:16

## 2017-01-01 RX ADMIN — LEVETIRACETAM 1500 MG: 100 SOLUTION ORAL at 09:51

## 2017-01-01 RX ADMIN — INSULIN ASPART 4 UNITS: 100 INJECTION, SOLUTION INTRAVENOUS; SUBCUTANEOUS at 00:22

## 2017-01-01 RX ADMIN — QUETIAPINE FUMARATE 25 MG: 25 TABLET, FILM COATED ORAL at 08:26

## 2017-01-01 RX ADMIN — ASPIRIN 300 MG: 300 SUPPOSITORY RECTAL at 08:13

## 2017-01-01 RX ADMIN — METOPROLOL TARTRATE 50 MG: 50 TABLET ORAL at 22:50

## 2017-01-01 RX ADMIN — ASPIRIN 300 MG: 300 SUPPOSITORY RECTAL at 17:38

## 2017-01-01 RX ADMIN — INSULIN ASPART 6 UNITS: 100 INJECTION, SOLUTION INTRAVENOUS; SUBCUTANEOUS at 10:26

## 2017-01-01 RX ADMIN — INSULIN ASPART 8 UNITS: 100 INJECTION, SOLUTION INTRAVENOUS; SUBCUTANEOUS at 11:45

## 2017-01-01 RX ADMIN — DIGOXIN 125 MCG: 125 TABLET ORAL at 12:42

## 2017-01-01 RX ADMIN — METOPROLOL TARTRATE 50 MG: 50 TABLET ORAL at 00:05

## 2017-01-01 RX ADMIN — POTASSIUM CHLORIDE 10 MEQ: 7.46 INJECTION, SOLUTION INTRAVENOUS at 08:13

## 2017-01-01 RX ADMIN — DIGOXIN 125 MCG: 125 TABLET ORAL at 12:13

## 2017-01-01 RX ADMIN — POTASSIUM CHLORIDE 10 MEQ: 10 INJECTION, SOLUTION INTRAVENOUS at 02:55

## 2017-01-01 RX ADMIN — METOPROLOL TARTRATE 50 MG: 50 TABLET ORAL at 22:34

## 2017-01-01 RX ADMIN — LEVETIRACETAM 1500 MG: 100 SOLUTION ORAL at 08:15

## 2017-01-01 RX ADMIN — IOPAMIDOL 95 ML: 755 INJECTION, SOLUTION INTRAVENOUS at 13:30

## 2017-01-01 RX ADMIN — ZIPRASIDONE MESYLATE 20 MG: 20 INJECTION, POWDER, LYOPHILIZED, FOR SOLUTION INTRAMUSCULAR at 10:21

## 2017-01-01 RX ADMIN — DIAZEPAM 5 MG: 5 INJECTION, SOLUTION INTRAMUSCULAR; INTRAVENOUS at 15:11

## 2017-01-01 RX ADMIN — METOPROLOL TARTRATE 50 MG: 50 TABLET ORAL at 22:00

## 2017-01-01 RX ADMIN — VALPROIC ACID 750 MG: 250 SOLUTION ORAL at 14:21

## 2017-01-01 RX ADMIN — INSULIN ASPART 10 UNITS: 100 INJECTION, SOLUTION INTRAVENOUS; SUBCUTANEOUS at 16:31

## 2017-01-01 RX ADMIN — GADOBENATE DIMEGLUMINE 18 ML: 529 INJECTION, SOLUTION INTRAVENOUS at 15:41

## 2017-01-01 RX ADMIN — POTASSIUM CHLORIDE 10 MEQ: 10 INJECTION, SOLUTION INTRAVENOUS at 04:24

## 2017-01-01 RX ADMIN — POTASSIUM CHLORIDE, DEXTROSE MONOHYDRATE AND SODIUM CHLORIDE 75 ML/HR: 300; 5; 900 INJECTION, SOLUTION INTRAVENOUS at 00:13

## 2017-01-01 RX ADMIN — ATORVASTATIN CALCIUM 80 MG: 80 TABLET, FILM COATED ORAL at 21:50

## 2017-01-01 RX ADMIN — INSULIN ASPART 3 UNITS: 100 INJECTION, SOLUTION INTRAVENOUS; SUBCUTANEOUS at 17:42

## 2017-01-01 RX ADMIN — MAGNESIUM SULFATE HEPTAHYDRATE 2 G: 1 INJECTION, SOLUTION INTRAVENOUS at 14:01

## 2017-01-01 RX ADMIN — AMPICILLIN SODIUM 500 MG: 500 INJECTION, POWDER, FOR SOLUTION INTRAMUSCULAR; INTRAVENOUS at 17:02

## 2017-01-01 RX ADMIN — ACETAMINOPHEN 650 MG: 325 TABLET ORAL at 19:53

## 2017-01-01 RX ADMIN — INSULIN ASPART 6 UNITS: 100 INJECTION, SOLUTION INTRAVENOUS; SUBCUTANEOUS at 22:37

## 2017-01-01 RX ADMIN — SODIUM CHLORIDE 50 ML/HR: 9 INJECTION, SOLUTION INTRAVENOUS at 18:14

## 2017-01-01 RX ADMIN — ATORVASTATIN CALCIUM 20 MG: 20 TABLET, FILM COATED ORAL at 11:44

## 2017-01-01 RX ADMIN — LEVETIRACETAM 1500 MG: 15 INJECTION INTRAVENOUS at 21:46

## 2017-01-01 RX ADMIN — METOPROLOL TARTRATE 50 MG: 50 TABLET ORAL at 14:22

## 2017-01-01 RX ADMIN — METOPROLOL TARTRATE 50 MG: 50 TABLET ORAL at 13:14

## 2017-01-01 RX ADMIN — MICONAZOLE NITRATE: 2 POWDER TOPICAL at 20:59

## 2017-01-01 RX ADMIN — POTASSIUM CHLORIDE 10 MEQ: 7.46 INJECTION, SOLUTION INTRAVENOUS at 06:46

## 2017-01-01 RX ADMIN — DIGOXIN 125 MCG: 125 TABLET ORAL at 13:21

## 2017-01-01 RX ADMIN — METOPROLOL TARTRATE 50 MG: 50 TABLET ORAL at 14:01

## 2017-01-01 RX ADMIN — CALCIUM ACETATE MONOHYDRATE AND ALUMINUM SULFATE TETRADECAHYDRATE 1 PACKET: 952; 1347 POWDER, FOR SOLUTION TOPICAL at 05:38

## 2017-01-01 RX ADMIN — VALPROIC ACID 750 MG: 250 SOLUTION ORAL at 21:02

## 2017-01-01 RX ADMIN — METOPROLOL TARTRATE 50 MG: 50 TABLET ORAL at 06:04

## 2017-01-01 RX ADMIN — INSULIN ASPART 6 UNITS: 100 INJECTION, SOLUTION INTRAVENOUS; SUBCUTANEOUS at 06:23

## 2017-01-01 RX ADMIN — METOPROLOL TARTRATE 5 MG: 5 INJECTION INTRAVENOUS at 01:35

## 2017-01-01 RX ADMIN — METOPROLOL TARTRATE 50 MG: 50 TABLET ORAL at 05:28

## 2017-01-01 RX ADMIN — SODIUM CHLORIDE 1293 MG PE: 9 INJECTION, SOLUTION INTRAVENOUS at 21:50

## 2017-01-01 RX ADMIN — METOPROLOL TARTRATE 50 MG: 50 TABLET ORAL at 12:24

## 2017-01-01 RX ADMIN — METOPROLOL TARTRATE 5 MG: 5 INJECTION INTRAVENOUS at 05:31

## 2017-01-01 RX ADMIN — METOPROLOL TARTRATE 50 MG: 50 TABLET ORAL at 13:06

## 2017-01-01 RX ADMIN — DEXTROSE MONOHYDRATE 25 G: 25 INJECTION, SOLUTION INTRAVENOUS at 11:41

## 2017-01-01 RX ADMIN — METOPROLOL TARTRATE 50 MG: 50 TABLET ORAL at 21:47

## 2017-01-01 RX ADMIN — VALPROATE SODIUM 750 MG: 100 INJECTION, SOLUTION INTRAVENOUS at 17:47

## 2017-01-01 RX ADMIN — ASPIRIN 300 MG: 300 SUPPOSITORY RECTAL at 09:27

## 2017-01-01 RX ADMIN — SODIUM CHLORIDE 75 ML/HR: 9 INJECTION, SOLUTION INTRAVENOUS at 04:24

## 2017-01-01 RX ADMIN — CEFTRIAXONE SODIUM 1 G: 1 INJECTION, SOLUTION INTRAVENOUS at 23:06

## 2017-01-01 RX ADMIN — METOPROLOL TARTRATE 50 MG: 50 TABLET ORAL at 09:42

## 2017-01-01 RX ADMIN — INSULIN ASPART 6 UNITS: 100 INJECTION, SOLUTION INTRAVENOUS; SUBCUTANEOUS at 08:54

## 2017-01-01 RX ADMIN — VANCOMYCIN HYDROCHLORIDE 1250 MG: 1 INJECTION, POWDER, LYOPHILIZED, FOR SOLUTION INTRAVENOUS at 11:22

## 2017-01-01 RX ADMIN — QUETIAPINE FUMARATE 25 MG: 25 TABLET, FILM COATED ORAL at 21:02

## 2017-01-01 RX ADMIN — HALOPERIDOL LACTATE 4 MG: 5 INJECTION, SOLUTION INTRAMUSCULAR at 00:28

## 2017-01-01 RX ADMIN — METOPROLOL TARTRATE 50 MG: 50 TABLET ORAL at 17:43

## 2017-01-01 RX ADMIN — SODIUM CHLORIDE, POTASSIUM CHLORIDE, SODIUM LACTATE AND CALCIUM CHLORIDE 1000 ML: 600; 310; 30; 20 INJECTION, SOLUTION INTRAVENOUS at 14:04

## 2017-01-01 RX ADMIN — INFLUENZA A VIRUS A/CALIFORNIA/7/2009 X-179A (H1N1) ANTIGEN (FORMALDEHYDE INACTIVATED), INFLUENZA A VIRUS A/HONG KONG/4801/2014 X-263B (H3N2) ANTIGEN (FORMALDEHYDE INACTIVATED), INFLUENZA B VIRUS B/PHUKET/3073/2013 ANTIGEN (FORMALDEHYDE INACTIVATED), AND INFLUENZA B VIRUS B/BRISBANE/60/2008 ANTIGEN (FORMALDEHYDE INACTIVATED) 0.5 ML: 15; 15; 15; 15 INJECTION, SUSPENSION INTRAMUSCULAR at 16:28

## 2017-01-01 RX ADMIN — AMPICILLIN SODIUM 500 MG: 500 INJECTION, POWDER, FOR SOLUTION INTRAMUSCULAR; INTRAVENOUS at 05:34

## 2017-01-01 RX ADMIN — HALOPERIDOL LACTATE 1 MG: 5 INJECTION, SOLUTION INTRAMUSCULAR at 23:27

## 2017-01-01 RX ADMIN — AMPICILLIN SODIUM 500 MG: 500 INJECTION, POWDER, FOR SOLUTION INTRAMUSCULAR; INTRAVENOUS at 22:58

## 2017-01-01 RX ADMIN — CALCIUM ACETATE MONOHYDRATE AND ALUMINUM SULFATE TETRADECAHYDRATE 1 PACKET: 952; 1347 POWDER, FOR SOLUTION TOPICAL at 21:02

## 2017-02-13 PROBLEM — I25.10 CAD (CORONARY ARTERY DISEASE): Status: ACTIVE | Noted: 2017-01-01

## 2017-02-13 PROBLEM — R55 SYNCOPE AND COLLAPSE: Status: ACTIVE | Noted: 2017-01-01

## 2017-02-13 PROBLEM — R30.0 DYSURIA: Status: ACTIVE | Noted: 2017-01-01

## 2017-02-13 PROBLEM — I48.20 CHRONIC A-FIB (HCC): Status: ACTIVE | Noted: 2017-01-01

## 2017-02-13 PROBLEM — E11.9 DM TYPE 2 (DIABETES MELLITUS, TYPE 2) (HCC): Status: ACTIVE | Noted: 2017-01-01

## 2017-02-13 NOTE — ED NOTES
Pt states he does take a blood thinner but he is unable to state what kind     Mary Jane Page, ADILENE  02/13/17 0848

## 2017-02-13 NOTE — ED PROVIDER NOTES
EMERGENCY DEPARTMENT ENCOUNTER       CHIEF COMPLAINT  Chief Complaint: Fall/Syncope  History given by: Patient, Daughter  History limited by: Patient is amnestic to the event   Room Number: 25/25  PMD: JANIE Urena      HPI:  HPI Comments: Pt presents to the ED s/p a possible syncopal episode today. While at the [employer] this AM, pt either lost consciousness and fell or fell and lost consciousness (pt is unable to differentiate because he is amnestic to the events directly prior to and after the incident). Pt did sustain injury to head during the fall/syncopal episode. Pt is currently on Coumadin due to h/o A-Fib. Pt denies neck pain, abd pain, and CP. There are no other complaints at this time.     Patient is a 71 y.o. male presenting with syncope.   Syncope   Episode history:  Single  Most recent episode:  Today  Duration: onset earlier today.  Timing:  Constant  Progression:  Resolved  Context: normal activity (while pt was at the [employer])    Relieved by:  Nothing  Worsened by:  Nothing  Associated symptoms: no chest pain          PAST MEDICAL HISTORY  Active Ambulatory Problems     Diagnosis Date Noted   • B12 deficiency 02/08/2016   • Thrombocytopenia 04/25/2016   • Carotid artery stenosis 12/15/2016     Resolved Ambulatory Problems     Diagnosis Date Noted   • No Resolved Ambulatory Problems     Past Medical History   Diagnosis Date   • Atrial fibrillation    • Coronary artery disease    • Depression    • Diabetes mellitus    • History of thrombocytopenia    • Hyperlipidemia    • Hypertension    • Old myocardial infarct 03/2015   • Stroke          PAST SURGICAL HISTORY  Past Surgical History   Procedure Laterality Date   • Coronary artery bypass graft        x 3   • Back surgery     • Carotid stent  2015   • Tonsillectomy and adenoidectomy     • Inguinal hernia repair           FAMILY HISTORY  Family History   Problem Relation Age of Onset   • Cancer Other    • Coronary artery disease  Other    • Diabetes Other    • Hypertension Other    • Cancer Mother    • Diabetes Father    • Heart disease Father    • Stroke Father    • Diabetes Daughter    • Diabetes Son    • Heart disease Son          SOCIAL HISTORY  Social History     Social History   • Marital status:      Spouse name: Krystal   • Number of children: N/A   • Years of education: N/A     Occupational History   •  Retired     Social History Main Topics   • Smoking status: Former Smoker     Quit date: 1995   • Smokeless tobacco: Former User      Comment: Quit 25 yrs ago   • Alcohol use No      Comment: quit in 1995   • Drug use: No   • Sexual activity: Not on file     Other Topics Concern   • Not on file     Social History Narrative         ALLERGIES  No known drug allergy        REVIEW OF SYSTEMS  Review of Systems   Unable to perform ROS: Other (pt is amnestic to the event)   Cardiovascular: Positive for syncope. Negative for chest pain.   Gastrointestinal: Negative for abdominal pain.   Musculoskeletal: Negative for neck pain.            PHYSICAL EXAM  ED Triage Vitals   Temp Heart Rate Resp BP SpO2   02/13/17 0844 02/13/17 0844 02/13/17 0844 02/13/17 0844 02/13/17 0844   97.7 °F (36.5 °C) 69 14 169/100 98 % WNL      Temp src Heart Rate Source Patient Position BP Location FiO2 (%)   -- 02/13/17 0902 02/13/17 0902 02/13/17 0902 --    Monitor Sitting Left arm        Physical Exam   Constitutional: No distress.   HENT:   Mouth/Throat: Mucous membranes are normal.   Eyes: EOM are normal. Pupils are equal, round, and reactive to light.   Neck: Normal range of motion. Neck supple.   Cardiovascular: Normal rate and normal heart sounds.  An irregular rhythm present.   Pulmonary/Chest: Effort normal and breath sounds normal. No respiratory distress. He has no decreased breath sounds. He has no wheezes. He has no rhonchi. He has no rales.   Abdominal: Soft. There is no tenderness. There is no rebound and no guarding.   Musculoskeletal: Normal  range of motion.   There is no hip tenderness.     Neurological: He is alert. He has normal sensation and normal strength.   Skin: Skin is warm and dry. Abrasion (to the occiput) noted.   Pt is amnestic to the event.    Psychiatric: Mood and affect normal.   Nursing note and vitals reviewed.          LAB RESULTS  Recent Results (from the past 24 hour(s))   Comprehensive Metabolic Panel    Collection Time: 02/13/17  9:06 AM   Result Value Ref Range    Glucose 208 (H) 65 - 99 mg/dL    BUN 13 8 - 23 mg/dL    Creatinine 0.76 0.76 - 1.27 mg/dL    Sodium 142 136 - 145 mmol/L    Potassium 4.2 3.5 - 5.2 mmol/L    Chloride 103 98 - 107 mmol/L    CO2 26.3 22.0 - 29.0 mmol/L    Calcium 9.0 8.6 - 10.5 mg/dL    Total Protein 6.7 6.0 - 8.5 g/dL    Albumin 4.00 3.50 - 5.20 g/dL    ALT (SGPT) 23 1 - 41 U/L    AST (SGOT) 20 1 - 40 U/L    Alkaline Phosphatase 102 39 - 117 U/L    Total Bilirubin 0.7 0.1 - 1.2 mg/dL    eGFR Non African Amer 101 >60 mL/min/1.73    Globulin 2.7 gm/dL    A/G Ratio 1.5 g/dL    BUN/Creatinine Ratio 17.1 7.0 - 25.0    Anion Gap 12.7 mmol/L   Magnesium    Collection Time: 02/13/17  9:06 AM   Result Value Ref Range    Magnesium 1.8 1.6 - 2.4 mg/dL   Troponin    Collection Time: 02/13/17  9:06 AM   Result Value Ref Range    Troponin T 0.032 (H) 0.000 - 0.030 ng/mL   Protime-INR    Collection Time: 02/13/17  9:06 AM   Result Value Ref Range    Protime 24.6 (H) 11.7 - 14.2 Seconds    INR 2.31 (H) 0.90 - 1.10   CBC Auto Differential    Collection Time: 02/13/17  9:06 AM   Result Value Ref Range    WBC 7.95 4.50 - 10.70 10*3/mm3    RBC 4.03 (L) 4.60 - 6.00 10*6/mm3    Hemoglobin 13.0 (L) 13.7 - 17.6 g/dL    Hematocrit 38.3 (L) 40.4 - 52.2 %    MCV 95.0 79.8 - 96.2 fL    MCH 32.3 27.0 - 32.7 pg    MCHC 33.9 32.6 - 36.4 g/dL    RDW 12.9 11.5 - 14.5 %    RDW-SD 44.8 37.0 - 54.0 fl    MPV 9.8 6.0 - 12.0 fL    Platelets 121 (L) 140 - 500 10*3/mm3    Neutrophil % 72.8 42.7 - 76.0 %    Lymphocyte % 18.2 (L) 19.6 - 45.3 %     Monocyte % 6.2 5.0 - 12.0 %    Eosinophil % 2.1 0.3 - 6.2 %    Basophil % 0.4 0.0 - 1.5 %    Immature Grans % 0.3 0.0 - 0.5 %    Neutrophils, Absolute 5.79 1.90 - 8.10 10*3/mm3    Lymphocytes, Absolute 1.45 0.90 - 4.80 10*3/mm3    Monocytes, Absolute 0.49 0.20 - 1.20 10*3/mm3    Eosinophils, Absolute 0.17 0.00 - 0.70 10*3/mm3    Basophils, Absolute 0.03 0.00 - 0.20 10*3/mm3    Immature Grans, Absolute 0.02 0.00 - 0.03 10*3/mm3       Ordered the above labs and reviewed the results.        RADIOLOGY  CT Head Without Contrast - Negative acute. Interpreted by radiologist. Discussed with radiologist. Independently viewed by me.             Ordered the above noted radiological studies. Reviewed by me in PACS.       PROCEDURES  Procedures    EKG:           EKG time: 09:27 AM  Rhythm/Rate: A-Fib with rate 63  P waves and AK: None present  QRS, axis: Normal QRS   ST and T waves: Nonspecific T wave flattening diffusely     Interpreted Contemporaneously by me, independently viewed  Unchanged compared to prior 10/28/15      PROGRESS AND CONSULTS  ED Course   Comment By Time   11:46 AM  Patient with syncope or head injury with amnesia to whole event.  States does not remember the incident or the ride in.  No seizure witnessed.  Patient unclear whether he passed out.  Has no symptoms here.  Troponin indeterminant.  Discussed with Dr. Stevens who will admit. John Hoskins MD 02/13 1147     10:53 AM: Rechecked pt. Pt is resting comfortably and appears in no acute distress. Informed pt and family that pt's troponin is 0.032 --> it is indeterminate. CT Head is negative acute. Need for admission for further evaluation. Pt agrees with plan. Decision time to admit: now.    10:55 AM: Placed call to A for admission.     11:36 AM: Discussed case with Dr. Stevens, hospitalist. He will admit pt to a telemetry bed.     11:43 AM: Ordered Zofran and IV fluids to treat for nausea and hydrate pt. Pt's BP is currently 110/83.          MEDICAL DECISION MAKING      MDM  Number of Diagnoses or Management Options     Amount and/or Complexity of Data Reviewed  Clinical lab tests: ordered and reviewed (Troponin is 0.032. )  Tests in the radiology section of CPT®: ordered and reviewed (CT Head is negative acute. )  Tests in the medicine section of CPT®: reviewed and ordered (EKG interpreted.   )  Discussion of test results with the performing providers: yes (CT Head results d/w radiologist.   )  Discuss the patient with other providers: yes (Case d/w Dr. Stevens, hospitalist, who will admit pt to a telemetry bed.   )    Patient Progress  Patient progress: stable             DIAGNOSIS  Final diagnoses:   Syncope and collapse   Head injury, initial encounter   Amnesia         DISPOSITION  Pt admitted to telemetry.    ADMISSION    Discussed treatment plan and reason for admission with pt/family and admitting physician.  Pt/family voiced understanding of the plan for admission for further testing/treatment as needed.         Latest Documented Vital Signs:  As of 11:57 AM  BP- 110/83 HR- 58 Temp- 97.3 °F (36.3 °C) O2 sat- 99%        --  Documentation assistance provided by rosa Lisa for Dr. Aidee MD.  Information recorded by the rosa was done at my direction and has been verified and validated by me.            Stacey Lisa  02/13/17 3128       John Hoskins MD  02/13/17 0909

## 2017-02-13 NOTE — PLAN OF CARE
Problem: Patient Care Overview (Adult)  Goal: Plan of Care Review  Outcome: Ongoing (interventions implemented as appropriate)    02/13/17 1259   Coping/Psychosocial Response Interventions   Plan Of Care Reviewed With patient   Patient Care Overview   Progress improving       Goal: Adult Individualization and Mutuality  Outcome: Ongoing (interventions implemented as appropriate)    02/13/17 1259   Individualization   Patient Specific Goals no falls; safety; return home at discharge   Patient Specific Interventions falls precautions; use of call light       Goal: Discharge Needs Assessment  Outcome: Ongoing (interventions implemented as appropriate)    02/13/17 1259   Discharge Needs Assessment   Concerns To Be Addressed no discharge needs identified   Readmission Within The Last 30 Days no previous admission in last 30 days   Equipment Needed After Discharge none   Discharge Planning Comments pt from home and plans to return home at discharge   Current Health   Anticipated Changes Related to Illness none   Self-Care   Equipment Currently Used at Home cane, straight   Living Environment   Transportation Available car         Problem: Fall Risk (Adult)  Goal: Identify Related Risk Factors and Signs and Symptoms  Outcome: Ongoing (interventions implemented as appropriate)    02/13/17 1259   Fall Risk   Fall Risk: Related Risk Factors history of falls   Fall Risk: Signs and Symptoms other (see comments)       Goal: Absence of Falls  Outcome: Ongoing (interventions implemented as appropriate)    02/13/17 1259   Fall Risk (Adult)   Absence of Falls making progress toward outcome

## 2017-02-13 NOTE — CONSULTS
Date of Hospital Visit: 17  Encounter Provider: Zheng Briones MD  Place of Service: [facility] CARDIOLOGY  Patient Name: Aime Hernandez  :1945  [id number]  Referral Provider: Rafat Stevens*    Chief complaint: syncope    History of Present Illness: Patient is a 71 year old male, followed by Dr. Sherwood for a history of coronary artery disease status post coronary artery bypass grafting, TIA, atrial fibrillation on warfarin, and stroke in 2015 at which time he was found to have left carotid artery stenosis and then had angioplasty with stenting of that. Patient presented to the emergency room this morning after a syncopal episode. Patient does not remember any symptoms leading up to the event and it was unwitnessed. He is currently at baseline and denies any chest pain, light headed, dizziness, syncope, near syncope, SOA, palpitations, diaphoresis. CT of the head was negative for any acute bleeding. His INR was 2.3 on admission. Patient's troponin was also noted to be slightly elevated (0.32). Repeat  troponin is pending.     Stress 16  · Myocardial perfusion imaging indicates a normal myocardial perfusion study with no evidence of ischemia.  · Left ventricular ejection fraction is normal (Calculated EF = 60%).  · Impressions are consistent with a low risk study.    Carotid doppler 6/30/15  IMPRESSION-  1. Left carotid stent appears widely patent.  2. No evidence of recurrent carotid stenosis on the left or right.  3. Both vertebral arteries are widely patent with normal antegrade flow.     Echo 3/18/15  Conclusions  The left ventricular chamber size is normal.  Global left ventricular wall motion and contractility are within normal  limits.  The estimated ejection fraction is 60-65%.   The left ventricular filling pattern is indeterminate.  The left atrium is mildly dilated.  A patent foramen ovale is not demonstrated by agitated saline  contrast.  Past Medical History   Diagnosis Date   • Atrial fibrillation    • B12 deficiency    • Coronary artery disease    • Depression    • Diabetes mellitus    • History of thrombocytopenia    • Hyperlipidemia    • Hypertension    • Old myocardial infarct 03/2015   • Stroke        Past Surgical History   Procedure Laterality Date   • Coronary artery bypass graft        x 3   • Back surgery     • Carotid stent  2015   • Tonsillectomy and adenoidectomy     • Inguinal hernia repair         Prescriptions Prior to Admission   Medication Sig Dispense Refill Last Dose   • acetaminophen (TYLENOL) 325 MG tablet Take 650 mg by mouth Every 6 (Six) Hours As Needed for mild pain (1-3).      • atorvastatin (LIPITOR) 20 MG tablet Take 20 mg by mouth daily.   Taking   • B-D ULTRAFINE III SHORT PEN 31G X 8 MM misc    Taking   • Bioflavonoid Products (VITAMIN C PLUS PO) Take  by mouth Daily.   Taking   • glipiZIDE (GLUCOTROL) 10 MG tablet Take 10 mg by mouth. Take 2 tabs twice daily   Taking   • insulin glargine (LANTUS) 100 UNIT/ML injection Inject 20 Units under the skin daily.   Not Taking   • JANUVIA 100 MG tablet 100 mg Daily.   Not Taking   • LANTUS SOLOSTAR 100 UNIT/ML injection pen Inject  under the skin 2 (Two) Times a Day.   Taking   • metFORMIN (GLUCOPHAGE) 1000 MG tablet 1,000 mg Daily With Breakfast.   Not Taking   • metoprolol tartrate (LOPRESSOR) 50 MG tablet Take 50 mg by mouth 2 (Two) Times a Day.   Taking   • nitroglycerin (NITROSTAT) 0.4 MG SL tablet Place 1 tablet under the tongue every 5 (five) minutes as needed for chest pain. 25 tablet 3 Taking   • saxagliptin (ONGLYZA) 2.5 MG tablet Take 2.5 mg by mouth Daily.   Taking   • sertraline (ZOLOFT) 50 MG tablet 50 mg Every Night.   Taking   • VITAMIN E PO Take  by mouth Daily.   Taking   • warfarin (COUMADIN) 5 MG tablet Take 5 mg by mouth 3 (Three) Times a Week. Monday, Wednesday, Friday      • warfarin (COUMADIN) 7.5 MG tablet Take 7.5 mg by mouth 4 (Four)  Times a Week. Sunday, Tuesday, Thursday, Saturday      • warfarin (COUMADIN) 5 MG tablet TAKE ONE AND ONE-HALF (1 & 1/2) TABLET BY MOUTH ON TUESDAY, AND TAKE ONE TABLET BY MOUTH ON ALL OTHER DAYS AS DIRECTED (Patient not taking: Reported on 2/13/2017) 100 tablet 0 Not Taking at Unknown time       Current Meds  Scheduled Meds:    atorvastatin 20 mg Oral Daily   insulin aspart 0-7 Units Subcutaneous 4x Daily AC & at Bedtime   insulin detemir 20 Units Subcutaneous Nightly   metoprolol tartrate 50 mg Oral BID   sertraline 50 mg Oral Nightly   warfarin 5 mg Oral Once per day on Mon Wed Fri   [START ON 2/14/2017] warfarin 7.5 mg Oral Once per day on Sun Tue Thu Sat     Continuous Infusions:    sodium chloride 100 mL/hr Last Rate: 100 mL/hr (02/13/17 2032)     PRN Meds:.•  acetaminophen  •  dextrose  •  dextrose  •  glucagon (human recombinant)  •  influenza vaccine  •  ondansetron  •  ondansetron **OR** ondansetron ODT **OR** ondansetron  •  pneumococcal polysaccharide 23-valent  •  sodium chloride  •  sodium chloride    Allergies as of 02/13/2017 - Isacc as Reviewed 02/13/2017   Allergen Reaction Noted   • No known drug allergy  10/18/2016       Social History     Social History   • Marital status:      Spouse name: Krystal   • Number of children: N/A   • Years of education: N/A     Occupational History   •  Retired     Social History Main Topics   • Smoking status: Former Smoker     Quit date: 1995   • Smokeless tobacco: Former User      Comment: Quit 25 yrs ago   • Alcohol use No      Comment: quit in 1995   • Drug use: No   • Sexual activity: Not on file     Other Topics Concern   • Not on file     Social History Narrative       Family History   Problem Relation Age of Onset   • Cancer Other    • Coronary artery disease Other    • Diabetes Other    • Hypertension Other    • Cancer Mother    • Diabetes Father    • Heart disease Father    • Stroke Father    • Diabetes Daughter    • Diabetes Son    • Heart disease  "Son        REVIEW OF SYSTEMS:   ROS was performed and is negative except as outlined in HPI     REVIEW OF SYSTEMS:   CONSTITUTIONAL: No weight loss, fever, chills, weakness or fatigue.   HEENT: Eyes: No visual loss, blurred vision, double vision or yellow sclerae. Ears, Nose, Throat: No hearing loss, sneezing, congestion, runny nose or sore throat.   SKIN: No rash or itching.     RESPIRATORY: No shortness of breath, hemoptysis, cough or sputum.   GASTROINTESTINAL: No anorexia, nausea, vomiting or diarrhea. No abdominal pain, bright red blood per rectum or melena.  GENITOURINARY: No burning on urination, hematuria or increased frequency.  NEUROLOGICAL: No headache, dizziness, syncope, paralysis, ataxia, numbness or tingling in the extremities. No change in bowel or bladder control.   MUSCULOSKELETAL: No muscle, back pain, joint pain or stiffness.   HEMATOLOGIC: No anemia, bleeding or bruising.   LYMPHATICS: No enlarged nodes. No history of splenectomy.   PSYCHIATRIC: No history of depression, anxiety, hallucinations.   ENDOCRINOLOGIC: No reports of sweating, cold or heat intolerance. No polyuria or polydipsia.       Objective:   Temp:  [97.3 °F (36.3 °C)-100.1 °F (37.8 °C)] 100.1 °F (37.8 °C)  Heart Rate:  [51-99] 95  Resp:  [14-17] 17  BP: ()/() 131/82  Body mass index is 30.42 kg/(m^2).  Flowsheet Rows         First Filed Value    Admission Height  69\" (175.3 cm) Documented at 02/13/2017 0859    Admission Weight  200 lb (90.7 kg) Documented at 02/13/2017 0859        Vitals:    02/13/17 2030   BP: 131/82   Pulse: 95   Resp: 17   Temp: 100.1 °F (37.8 °C)   SpO2: 97%       Head:    Normocephalic, without obvious abnormality, atraumatic   Eyes:            Lids and lashes normal, conjunctivae and sclerae normal, no   icterus, no pallor   Ears:    Ears appear intact with no abnormalities noted   Throat:   No oral lesions, dentition good   Neck:   No adenopathy, supple, trachea midline, no thyromegaly, no   " carotid bruit, no JVD   Lungs:     Breath sounds are equal and clear to auscultation    Heart:    Normal S1 and S2, iRRR, No M/G/R   Abdomen:     Normal bowel sounds, no masses, no organomegaly, soft        non-tender, non-distended, no guarding   Extremities:   Moves all extremities well, no edema, no cyanosis, no redness   Pulses:   Pulses palpable and equal bilaterally.    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 He does not have any recollection of what happened to him until he gets to the hospital. So, it is unclear to me whether he sustained a concussion. He has a narrow QRS on his ECG. He does have very mildly increased troponin, but it is not really going up. He has not had chest pain, shortness of breath, or palpitations. What he has had is that he has had diarrhea recently and I wonder if this could have been orthostatic symptoms. He has been getting hydrated today. I think we will just observe him. Repeat his ECG and troponin in the morning. I do not think this is an ischemic rhythm. It just seems odd.        Zheng Briones MD  02/13/17  9:10 PM.

## 2017-02-13 NOTE — H&P
[facility] Admission H&P    Patient Care Team:  JANIE Vilchis as PCP - General (Family Medicine)  Basilio Briggs MD as Consulting Physician (Hematology and Oncology)  Sea Sherwood MD as Consulting Physician (Cardiology)    Chief complaint: I fell and passed out    History of Present Illness    This is a 71-year-old white male with a history of coronary artery disease, carotid stenosis, atrial fibrillation for which he is on Coumadin, and diabetes mellitus who presented to the emergency room after he sustained a fall and loss of consciousness earlier today.  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  He is amnestic to the events that immediately preceded the event.  All that we are able to obtain is that he fell and hit the back of his head.  He was found to be unconscious thereafter.  It is not clear if he had a syncopal episode first which led to the fall or if he concussed himself.  There are no reports of witnessed seizure activity.  Again, the patient does not remember the events just before and after the episode.  It is my understanding that the moment by moment events that led to him becoming unconscious were unwitnessed.  He denies any chest pain, shortness of breath, nausea, vomiting, fevers or chills, abdominal pain.  He has been having turning with urination today and has had increased urinary frequency.  He denies any recent medication changes.  He is feeling back to baseline currently.    Past Medical History   Diagnosis Date   • Atrial fibrillation    • B12 deficiency    • Coronary artery disease    • Depression    • Diabetes mellitus    • History of thrombocytopenia    • Hyperlipidemia    • Hypertension    • Old myocardial infarct 03/2015   • Stroke      Past Surgical History   Procedure Laterality Date   • Coronary artery bypass graft        x 3   • Back surgery     • Carotid stent  2015   • Tonsillectomy and adenoidectomy     • Inguinal hernia repair       Family History    Problem Relation Age of Onset   • Cancer Other    • Coronary artery disease Other    • Diabetes Other    • Hypertension Other    • Cancer Mother    • Diabetes Father    • Heart disease Father    • Stroke Father    • Diabetes Daughter    • Diabetes Son    • Heart disease Son      Social History   Substance Use Topics   • Smoking status: Former Smoker     Quit date: 1995   • Smokeless tobacco: Former User      Comment: Quit 25 yrs ago   • Alcohol use No      Comment: quit in 1995     Prescriptions Prior to Admission   Medication Sig Dispense Refill Last Dose   • acetaminophen (TYLENOL) 325 MG tablet Take 650 mg by mouth Every 6 (Six) Hours As Needed for mild pain (1-3).      • atorvastatin (LIPITOR) 20 MG tablet Take 20 mg by mouth daily.   Taking   • B-D ULTRAFINE III SHORT PEN 31G X 8 MM misc    Taking   • Bioflavonoid Products (VITAMIN C PLUS PO) Take  by mouth Daily.   Taking   • glipiZIDE (GLUCOTROL) 10 MG tablet Take 10 mg by mouth. Take 2 tabs twice daily   Taking   • insulin glargine (LANTUS) 100 UNIT/ML injection Inject 20 Units under the skin daily.   Not Taking   • JANUVIA 100 MG tablet 100 mg Daily.   Not Taking   • LANTUS SOLOSTAR 100 UNIT/ML injection pen Inject  under the skin 2 (Two) Times a Day.   Taking   • metFORMIN (GLUCOPHAGE) 1000 MG tablet 1,000 mg Daily With Breakfast.   Not Taking   • metoprolol tartrate (LOPRESSOR) 50 MG tablet Take 50 mg by mouth 2 (Two) Times a Day.   Taking   • nitroglycerin (NITROSTAT) 0.4 MG SL tablet Place 1 tablet under the tongue every 5 (five) minutes as needed for chest pain. 25 tablet 3 Taking   • saxagliptin (ONGLYZA) 2.5 MG tablet Take 2.5 mg by mouth Daily.   Taking   • sertraline (ZOLOFT) 50 MG tablet 50 mg Every Night.   Taking   • VITAMIN E PO Take  by mouth Daily.   Taking   • warfarin (COUMADIN) 5 MG tablet Take 5 mg by mouth 3 (Three) Times a Week. Monday, Wednesday, Friday      • warfarin (COUMADIN) 7.5 MG tablet Take 7.5 mg by mouth 4 (Four) Times a  Week. , Tuesday, Thursday, Saturday      • warfarin (COUMADIN) 5 MG tablet TAKE ONE AND ONE-HALF (1 & 1/2) TABLET BY MOUTH ON TUESDAY, AND TAKE ONE TABLET BY MOUTH ON ALL OTHER DAYS AS DIRECTED (Patient not taking: Reported on 2017) 100 tablet 0 Not Taking at Unknown time     Allergies:  No known drug allergy    Review of Systems   Unable to perform ROS: Other   He is amnestic to the events that occurred earlier.  All other significant review of systems listed in history of present illness    PHYSICAL EXAM    Vital Signs  tMax 24 hrs:  Temp (24hrs), Av.6 °F (36.4 °C), Min:97.3 °F (36.3 °C), Max:97.9 °F (36.6 °C)    Vitals Ranges:  Temp:  [97.3 °F (36.3 °C)-97.9 °F (36.6 °C)] 97.5 °F (36.4 °C)  Heart Rate:  [51-84] 69  Resp:  [14-16] 16  BP: ()/() 138/87    Physical Exam   Constitutional: He is oriented to person, place, and time. He appears well-developed and well-nourished. No distress.   HENT:   Head: Normocephalic.   Scattered bruising of the posterior occipital region.  No lacerations or active bleeding.   Eyes: EOM are normal. Pupils are equal, round, and reactive to light.   Neck: Neck supple. No tracheal deviation present.   Cardiovascular: Normal rate.  Exam reveals no gallop.    No murmur heard.  Irregular rhythm   Pulmonary/Chest: Effort normal. No respiratory distress. He has no wheezes.   Abdominal: Soft. Bowel sounds are normal. He exhibits no distension. There is no tenderness.   Musculoskeletal: He exhibits no edema or tenderness.   Neurological: He is alert and oriented to person, place, and time. No cranial nerve deficit.   He is amnestic to the events that occurred earlier today.  His memory of events from this morning prior to his fall are intact as well as those of being in the emergency room earlier today leading up until now.   Skin: Skin is warm and dry.   Nursing note and vitals reviewed.      Results Review:    Results from last 7 days  Lab Units 17  0906    WBC 10*3/mm3 7.95   HEMOGLOBIN g/dL 13.0*   HEMATOCRIT % 38.3*   PLATELETS 10*3/mm3 121*       Results from last 7 days  Lab Units 02/13/17  0906   SODIUM mmol/L 142   POTASSIUM mmol/L 4.2   CHLORIDE mmol/L 103   TOTAL CO2 mmol/L 26.3   BUN mg/dL 13   CREATININE mg/dL 0.76   CALCIUM mg/dL 9.0   BILIRUBIN mg/dL 0.7   ALK PHOS U/L 102   ALT (SGPT) U/L 23   AST (SGOT) U/L 20   GLUCOSE mg/dL 208*     Troponin 0.032  Magnesium 1.8  INR 2.3    EKG shows atrial fibrillation    CT head without contrast:  No evidence for acute traumatic intracranial pathology.   Multiple chronic infarcts are identified within the brain parenchyma.     I reviewed the patient's new clinical results.  I reviewed the patient's new imaging results and agree with the interpretation.  I personally viewed and interpreted the patient's EKG/Telemetry data      Principal Problem:    Syncope and collapse  Active Problems:    Carotid artery stenosis    CAD (coronary artery disease)    Chronic a-fib    DM type 2 (diabetes mellitus, type 2)    Dysuria      Assessment & Plan    The patient will be admitted.  It is unclear if he had a syncopal episode that then led to his fall or if the fall and active hitting his head caused him to become unconscious.  I will go ahead and work him up for possible syncope with echocardiogram and carotid ultrasound.  We'll monitor him on telemetry.  I'll ask cardiology to evaluate.  He does have an indeterminate troponin and nasal be trended over the course of the evening.  Head CT looks okay.  We will continue Coumadin and monitor INR.  I will check urinalysis for his complaints of dysuria and increased frequency.  Continue outpatient insulin and monitor blood sugars to ensure no hypoglycemia.  Check hemoglobin A1c.  Physical therapy evaluation.  Additional plans based on his clinical course.    I discussed the patients findings and my recommendations with patient and nursing staff    Rafat Stevens,  MD  02/13/17  2:10 PM

## 2017-02-14 PROBLEM — N39.0 UTI (URINARY TRACT INFECTION): Status: ACTIVE | Noted: 2017-01-01

## 2017-02-14 NOTE — NURSING NOTE
PT found kneeling on side of bed at approximately 230 AM.  No apparent injury. MD, HM and family notified.  Proper paperwork completed.  Bed re-zeroed and bed alarm restarted.

## 2017-02-14 NOTE — PROGRESS NOTES
" LOS: 1 day     Name: Aime Hernandez  Age: 71 y.o.  Sex: male  :  1945  MRN: [id number]         Primary Care Physician: JANIE Urena    Subjective   Subjective  Feels well, no new complaints.    Objective   Vital Signs  Temp:  [97.9 °F (36.6 °C)-100.1 °F (37.8 °C)] 97.9 °F (36.6 °C)  Heart Rate:  [81-99] 96  Resp:  [17-19] 19  BP: (118-155)/(75-99) 132/79  Body mass index is 30.42 kg/(m^2).    Objective:  General Appearance:  Comfortable and in no acute distress.    Vital signs: (most recent): Blood pressure 132/79, pulse 96, temperature 97.9 °F (36.6 °C), temperature source Oral, resp. rate 19, height 69\" (175.3 cm), weight 206 lb (93.4 kg), SpO2 95 %.    Lungs:  Normal respiratory rate and normal effort.    Heart: Normal rate.  Irregular rhythm.    Abdomen: Abdomen is soft.  Bowel sounds are normal.   There is no abdominal tenderness.     Extremities: There is no local swelling or dependent edema.    Neurological: Patient is alert and oriented to person, place and time.    Skin:  Warm and dry.              Results Review:       I reviewed the patient's new clinical results.      Results from last 7 days  Lab Units 17  0626 17  0906   WBC 10*3/mm3 8.86 7.95   HEMOGLOBIN g/dL 11.4* 13.0*   PLATELETS 10*3/mm3 125* 121*       Results from last 7 days  Lab Units 17  0626 17  0906   SODIUM mmol/L 140 142   POTASSIUM mmol/L 3.6 4.2   CHLORIDE mmol/L 104 103   TOTAL CO2 mmol/L 26.4 26.3   BUN mg/dL 14 13   CREATININE mg/dL 0.80 0.76   CALCIUM mg/dL 8.5* 9.0   GLUCOSE mg/dL 152* 208*       Results from last 7 days  Lab Units 17  0626 17  0906   INR  2.49* 2.31*     Scheduled Meds:     atorvastatin 20 mg Oral Daily   ceftriaxone 1 g Intravenous Q24H   insulin aspart 0-7 Units Subcutaneous 4x Daily AC & at Bedtime   insulin detemir 20 Units Subcutaneous Nightly   metoprolol tartrate 50 mg Oral BID   sertraline 50 mg Oral Nightly   warfarin 5 mg Oral Once per day on " Mon Wed Fri   warfarin 7.5 mg Oral Once per day on Sun Tue Thu Sat     PRN Meds:   •  acetaminophen  •  dextrose  •  dextrose  •  glucagon (human recombinant)  •  influenza vaccine  •  ondansetron  •  ondansetron **OR** ondansetron ODT **OR** ondansetron  •  pneumococcal polysaccharide 23-valent  •  sodium chloride  •  sodium chloride  Continuous Infusions:    sodium chloride 100 mL/hr Last Rate: 100 mL/hr (02/14/17 1001)       Assessment/Plan   Principal Problem:    Syncope and collapse  Active Problems:    Carotid artery stenosis    CAD (coronary artery disease)    Chronic a-fib    DM type 2 (diabetes mellitus, type 2)    Dysuria    UTI (urinary tract infection)      Assessment & Plan    - Rocephin started for UTI, await Ucx  - ECHO and Carotid reviewed, appear unremarkable  - Glucose stable  - Feel he is adequately hydrated at this point, will stop IVF  - Troponins downtrending, no ischemia per cardiology  - Syncope likely from combination of UTI and dehydration  - Possible d/c tomorrow    Rafat Stevens MD  [facility]  02/14/17  12:36 PM

## 2017-02-14 NOTE — PLAN OF CARE
Problem: Patient Care Overview (Adult)  Goal: Plan of Care Review    02/14/17 1333   Outcome Evaluation   Outcome Summary/Follow up Plan Pt presents with slighlty unsteady gait and would benefit from PT to address balance and gait training.         Problem: Inpatient Physical Therapy  Goal: Bed Mobility Goal LTG- PT    02/14/17 1333   Bed Mobility PT LTG   Bed Mobility PT LTG, Date Established 02/14/17   Bed Mobility PT LTG, Time to Achieve 1 wk   Bed Mobility PT LTG, Activity Type all bed mobility   Bed Mobility PT LTG, [place] Level independent       Goal: Transfer Training Goal 1 LTG- PT    02/14/17 1333   Transfer Training PT LTG   Transfer Training PT LTG, Date Established 02/14/17   Transfer Training PT LTG, Time to Achieve 1 wk   Transfer Training PT LTG, Activity Type all transfers   Transfer Training PT LTG, [place] Level independent   Transfer Training PT LTG, Assist Device cane, straight       Goal: Gait Training Goal LTG- PT    02/14/17 1333   Gait Training PT LTG   Gait Training Goal PT LTG, Date Established 02/14/17   Gait Training Goal PT LTG, Time to Achieve 1 wk   Gait Training Goal PT LTG, [place] Level independent   Gait Training Goal PT LTG, Assist Device cane, straight   Gait Training Goal PT LTG, Distance to Achieve 150 ft       Goal: Patient Education Goal LTG- PT    02/14/17 1333   Patient Education PT LTG   Patient Education PT LTG, Date Established 02/14/17   Patient Education PT LTG, Time to Achieve 1 wk   Patient Education PT LTG, Education Type HEP   Patient Education PT LTG, Education Understanding demonstrate adequately

## 2017-02-14 NOTE — PROGRESS NOTES
"CC: Syncope    Interval History:   Off floor    Vital Signs  Temp:  [97.3 °F (36.3 °C)-100.1 °F (37.8 °C)] 98.6 °F (37 °C)  Heart Rate:  [51-99] 85  Resp:  [14-19] 19  BP: ()/() 118/75    Intake/Output Summary (Last 24 hours) at 02/14/17 0839  Last data filed at 02/14/17 0102   Gross per 24 hour   Intake   1010 ml   Output    550 ml   Net    460 ml     Flowsheet Rows         First Filed Value    Admission Height  69\" (175.3 cm) Documented at 02/13/2017 0859    Admission Weight  200 lb (90.7 kg) Documented at 02/13/2017 0859          PHYSICAL EXAM:  Off floor    Results Review:      Results from last 7 days  Lab Units 02/14/17  0626   SODIUM mmol/L 140   POTASSIUM mmol/L 3.6   CHLORIDE mmol/L 104   TOTAL CO2 mmol/L 26.4   BUN mg/dL 14   CREATININE mg/dL 0.80   GLUCOSE mg/dL 152*   CALCIUM mg/dL 8.5*       Results from last 7 days  Lab Units 02/13/17  2356 02/13/17  1755 02/13/17  0906   TROPONIN T ng/mL 0.028 0.019 0.032*       Results from last 7 days  Lab Units 02/14/17  0626   WBC 10*3/mm3 8.86   HEMOGLOBIN g/dL 11.4*   HEMATOCRIT % 33.6*   PLATELETS 10*3/mm3 125*       Results from last 7 days  Lab Units 02/14/17  0626 02/13/17  0906   INR  2.49* 2.31*           Results from last 7 days  Lab Units 02/13/17  0906   MAGNESIUM mg/dL 1.8         @LABRCNT(bnp)@  I reviewed the patient's new clinical results.  I personally viewed and interpreted the patient's EKG/Telemetry data        Medication Review:   Meds reviewed      sodium chloride 100 mL/hr Last Rate: 100 mL/hr (02/13/17 2032)       Assessment/Plan  1. This is a 70-year-old gentleman with syncope.  Off the floor now we'll review his echo and Holter.  2.  history a of severe coronary disease status post  coronary artery bypass grafting, mild left ventricular systolic dysfunction, left  ventricular ejection fraction of 45%. However, echocardiogram in December 2012 showed  left ventricular ejection fraction was 61% with some inferior hypokinesis. He " has  occlusion of the vein graft after the posterior descending artery to the left ventricular  branch, ischemic areas being the marginal left ventricular branch but now doing well on  medical therapy.  Normal perfusion stress test June 2016.  2.  Chronic atrial fibrillation.   • The patient's CHADS2-VASc score is 6  Continue the same.  3. History of diabetes mellitus followed in your office.   4. Hypertension. His blood pressure is adequately controlled.   5. Hyperlipidemia on atorvastatin. Target LDL would be 70 or less    6. Carotid artery stenosis with carotid artery stenting of the left internal carotid artery. This could have accounted for his recurrent TIAs despite his therapeutic INR.  Carotid ultrasound pending.  7.  Equivocal troponin no ECG changes no ischemic symptoms.            Sea Sherwood MD  02/14/17  8:39 AM

## 2017-02-14 NOTE — PLAN OF CARE
Problem: Patient Care Overview (Adult)  Goal: Plan of Care Review  Outcome: Ongoing (interventions implemented as appropriate)    02/13/17 1259 02/13/17 2030 02/14/17 0141   Coping/Psychosocial Response Interventions   Plan Of Care Reviewed With --  patient --    Patient Care Overview   Progress improving --  --    Outcome Evaluation   Outcome Summary/Follow up Plan --  --  new admit for syncope and uti, iv rocephin, recent fall, on coumadin, afib, ac/hs, plan d/c home       Goal: Adult Individualization and Mutuality  Outcome: Ongoing (interventions implemented as appropriate)  Goal: Discharge Needs Assessment  Outcome: Ongoing (interventions implemented as appropriate)    Problem: Fall Risk (Adult)  Goal: Identify Related Risk Factors and Signs and Symptoms  Outcome: Outcome(s) achieved Date Met:  02/14/17  Goal: Absence of Falls  Outcome: Ongoing (interventions implemented as appropriate)  Goal: Identify Related Risk Factors and Signs and Symptoms  Outcome: Outcome(s) achieved Date Met:  02/14/17  Goal: Absence of Falls  Outcome: Ongoing (interventions implemented as appropriate)    Problem: Infection, Risk/Actual (Adult)  Goal: Identify Related Risk Factors and Signs and Symptoms  Outcome: Outcome(s) achieved Date Met:  02/14/17  Goal: Infection Prevention/Resolution  Outcome: Ongoing (interventions implemented as appropriate)

## 2017-02-14 NOTE — PROGRESS NOTES
Discharge Planning Assessment  [facility]     Patient Name: Aime Hernandez  MRN: [id number]  Today's Date: 2/14/2017    Admit Date: 2/13/2017          Discharge Needs Assessment       02/14/17 1444    Living Environment    Lives With spouse;child(toni), adult    Living Arrangements house    Quality Of Family Relationships supportive    Able to Return to Prior Living Arrangements yes    Discharge Needs Assessment    Concerns To Be Addressed no discharge needs identified    Anticipated Changes Related to Illness none    Equipment Currently Used at Home walker, standard;cane, straight   Uses walker when outside and feels he needs it.    Equipment Needed After Discharge none    Transportation Available car;family or friend will provide    Discharge Planning Comments Wife Krystal [phone]            Discharge Plan       02/14/17 1446    Case Management/Social Work Plan    Plan Return home    Patient/Family In Agreement With Plan yes    Additional Comments Spoke with patient at bedside.  Patient is IADL, has a cane and walker, but states he only uses them occasionally.  He has been to SNF in the past.  Patient plans to return home at DC - lives with wife and daughter, and does not anticipate any DC needs  CCP will follow as needed.        Discharge Placement     No information found                Demographic Summary       02/14/17 1443    Referral Information    Admission Type inpatient    Arrived From admitted as an inpatient;home or self-care    Referral Source admission list    Reason For Consult discharge planning    Record Reviewed history and physical    Primary Care Physician Information    Name JANIE Bravo ([place])            Functional Status       02/14/17 1443    Functional Status Current    Ambulation 2-->assistive person    Transferring 2-->assistive person    Toileting 2-->assistive person    Bathing 2-->assistive person    Dressing 0-->independent    Eating 0-->independent     Communication 0-->understands/communicates without difficulty    Change in Functional Status Since Onset of Current Illness/Injury no    Functional Status Prior    Ambulation 0-->independent    Transferring 0-->independent    Toileting 0-->independent    Bathing 0-->independent    Dressing 0-->independent    Eating 0-->independent    Communication 0-->understands/communicates without difficulty    IADL    Medications independent    Meal Preparation assistive person   Wife    Housekeeping assistive person    Laundry assistive person    Shopping assistive person    Oral Care independent    Activity Tolerance    Current Activity Limitations none    Usual Activity Tolerance good    Current Activity Tolerance moderate    Cognitive/Perceptual/Developmental    Current Mental Status/Cognitive Functioning no deficits noted    Recent Changes in Mental Status/Cognitive Functioning no changes            Psychosocial     None            Abuse/Neglect     None            Legal     None            Substance Abuse     None            Patient Forms     None          Becky S. [place], RN

## 2017-02-14 NOTE — PROGRESS NOTES
Acute Care - Physical Therapy Initial Evaluation  [facility]     Patient Name: Aime Hernandez  : 1945  MRN: [id number]  Today's Date: 2017                Admit Date: 2017     Visit Dx:    ICD-10-CM ICD-9-CM   1. Syncope and collapse R55 780.2   2. Head injury, initial encounter S09.90XA 959.01   3. Amnesia R41.3 780.93   4. Difficulty walking R26.2 719.7     Patient Active Problem List   Diagnosis   • B12 deficiency   • Thrombocytopenia   • Carotid artery stenosis   • Syncope and collapse   • CAD (coronary artery disease)   • Chronic a-fib   • DM type 2 (diabetes mellitus, type 2)   • Dysuria   • UTI (urinary tract infection)     Past Medical History   Diagnosis Date   • Atrial fibrillation    • B12 deficiency    • Coronary artery disease    • Depression    • Diabetes mellitus    • History of thrombocytopenia    • Hyperlipidemia    • Hypertension    • Old myocardial infarct 2015   • Stroke      Past Surgical History   Procedure Laterality Date   • Coronary artery bypass graft        x 3   • Back surgery     • Carotid stent     • Tonsillectomy and adenoidectomy     • Inguinal hernia repair            PT ASSESSMENT (last 72 hours)      PT Evaluation       17 1102 17 1417    Rehab Evaluation    Document Type evaluation  -     Subjective Information agree to therapy;no complaints  -     Evaluation Not Performed  --   Pt just admitted. Limited infomationin chart. Will follow up tomorrow  -    Patient Effort, Rehab Treatment excellent  -     Symptoms Noted During/After Treatment none  -     General Information    General Observations in chair  -     Pertinent History Of Current Problem syncope  -     Precautions/Limitations fall precautions  -     Prior Level of Function independent:  -     Equipment Currently Used at Home cane, straight  -     Plans/Goals Discussed With patient  -     Clinical Impression    Patient/Family Goals Statement return home  -      Criteria for Skilled Therapeutic Interventions Met treatment indicated  -     Impairments Found (describe specific impairments) gait, locomotion, and balance  -     Rehab Potential good, to achieve stated therapy goals  -     Pain Assessment    Pain Assessment No/denies pain  -     Cognitive Assessment/Intervention    Current Cognitive/Communication Assessment functional  -     Orientation Status oriented x 4  -     Follows Commands/Answers Questions 100% of the time  -KH     Personal Safety WNL/WFL  -     Personal Safety Interventions fall prevention program maintained;gait belt  -     ROM (Range of Motion)    General ROM Detail WFL  -     MMT (Manual Muscle Testing)    General MMT Assessment Detail WFL  -     Bed Mobility, Assessment/Treatment    Bed Mob, Supine to Sit, [place] not tested  -     Bed Mob, Sit to Supine, [place] not tested  -     Bed Mobility, Comment in chair  -     Transfer Assessment/Treatment    Transfers, Sit-Stand [place] contact guard assist  -     Transfers, Stand-Sit [place] stand by assist  -     Gait Assessment/Treatment    Gait, [place] Level contact guard assist;minimum assist (75% patient effort)  -     Gait, Distance (Feet) 150  -     Gait, Gait Deviations tamika decreased;narrow base  -     Gait, Safety Issues balance decreased during turns  -     Gait, Comment slightly unsteady at times, LOB x 2 with min assist to recover  -     Positioning and Restraints    Pre-Treatment Position sitting in chair/recliner  -     Post Treatment Position chair  -KH     In Chair sitting;call light within reach;encouraged to call for assist  -       02/13/17 1307 02/13/17 1302    General Information    Equipment Currently Used at Home  cane, straight  -KT    Living Environment    Lives With child(toni), adult;spouse  -KT     Living Arrangements house  -KT     Home Accessibility no concerns  -KT     Stair Railings at Home other (see  comments)   n/a  -KT     Type of Financial/Environmental Concern none  -KT     Transportation Available car  -KT       02/13/17 1259       General Information    Equipment Currently Used at Home cane, straight  -KT     Living Environment    Transportation Available car  -KT       User Key  (r) = Recorded By, (t) = Taken By, (c) = Cosigned By    Initials Name Provider Type     Karo Disla, PT Physical Therapist    KT Marichuy Chavez, RN Registered Nurse          Physical Therapy Education     Title: PT OT SLP Therapies (Active)     Topic: Physical Therapy (Active)     Point: Mobility training (Done)    Learning Progress Summary    Learner Readiness Method Response Comment Documented by Status   Patient Acceptance E [facility] 02/14/17 1333 Done               Point: Home exercise program (Done)    Learning Progress Summary    Learner Readiness Method Response Comment Documented by Status   Patient Acceptance E [facility] 02/14/17 1333 Done                      User Key     Initials Effective Dates Name Provider Type Discipline     05/18/15 -  Karo Disla, PT Physical Therapist PT                PT Recommendation and Plan  Anticipated Discharge Disposition: home with assist  Planned Therapy Interventions: balance training, bed mobility training, gait training, home exercise program, stair training, transfer training  PT Frequency: daily  Plan of Care Review  Outcome Summary/Follow up Plan: Pt presents with slighlty unsteady gait and would benefit from PT to address balance and gait training.          IP PT Goals       02/14/17 1333          Bed Mobility PT LTG    Bed Mobility PT LTG, Date Established 02/14/17  -      Bed Mobility PT LTG, Time to Achieve 1 wk  -      Bed Mobility PT LTG, Activity Type all bed mobility  -      Bed Mobility PT LTG, [place] Level independent  -      Transfer Training PT LTG    Transfer Training PT LTG, Date Established 02/14/17  -      Transfer  Training PT LTG, Time to Achieve 1 wk  -KH      Transfer Training PT LTG, Activity Type all transfers  -KH      Transfer Training PT LTG, [place] Level independent  -KH      Transfer Training PT LTG, Assist Device cane, straight  -KH      Gait Training PT LTG    Gait Training Goal PT LTG, Date Established 02/14/17  -KH      Gait Training Goal PT LTG, Time to Achieve 1 wk  -KH      Gait Training Goal PT LTG, [place] Level independent  -KH      Gait Training Goal PT LTG, Assist Device cane, straight  -KH      Gait Training Goal PT LTG, Distance to Achieve 150 ft  -KH      Patient Education PT LTG    Patient Education PT LTG, Date Established 02/14/17  -KH      Patient Education PT LTG, Time to Achieve 1 wk  -KH      Patient Education PT LTG, Education Type HEP  -KH      Patient Education PT LTG, Education Understanding demonstrate adequately  -KH        User Key  (r) = Recorded By, (t) = Taken By, (c) = Cosigned By    Initials Name Provider Type    KASH Disla, PT Physical Therapist                Outcome Measures       02/14/17 1115          How much help from another person do you currently need...    Turning from your back to your side while in flat bed without using bedrails? 4  -KH      Moving from lying on back to sitting on the side of a flat bed without bedrails? 4  -KH      Moving to and from a bed to a chair (including a wheelchair)? 3  -KH      Standing up from a chair using your arms (e.g., wheelchair, bedside chair)? 3  -KH      Climbing 3-5 steps with a railing? 3  -KH      To walk in hospital room? 3  -KH      AM-PAC 6 Clicks Score 20  -KH      Functional Assessment    Outcome Measure Options AM-PAC 6 Clicks Basic Mobility (PT)  -KH        User Key  (r) = Recorded By, (t) = Taken By, (c) = Cosigned By    Initials Name Provider Type    KASH Disla, [place] Physical Therapist           Time Calculation:         PT Charges       02/14/17 1119          Time Calculation     Start Time 1050  -      Stop Time 1101  -      Time Calculation (min) 11 min  -      PT Received On 02/14/17  -      PT - Next Appointment 02/15/17  -      PT Goal Re-Cert Due Date 02/21/17  -        User Key  (r) = Recorded By, (t) = Taken By, (c) = Cosigned By    Initials Name Provider Type     Karo Disla, PT Physical Therapist          Therapy Charges for Today     Code Description Service Date Service Provider Modifiers Qty    84842866325 HC PT EVAL LOW COMPLEXITY 2 2/14/2017 Karo Disla, PT GP 1    12569961975 HC PT THER PROC EA 15 MIN 2/14/2017 Karo Disla, PT GP 1          PT G-Codes  Outcome Measure Options: AM-PAC 6 Clicks Basic Mobility (PT)      Karo Disla, PT  2/14/2017

## 2017-02-14 NOTE — PLAN OF CARE
Problem: Patient Care Overview (Adult)  Goal: Plan of Care Review  Outcome: Ongoing (interventions implemented as appropriate)    02/14/17 1614   Coping/Psychosocial Response Interventions   Plan Of Care Reviewed With patient   Patient Care Overview   Progress improving   Outcome Evaluation   Outcome Summary/Follow up Plan Pt is alert and orientated. Patient had an echo today. Possible discharge tomorrow.       Goal: Adult Individualization and Mutuality  Outcome: Ongoing (interventions implemented as appropriate)    Problem: Fall Risk (Adult)  Goal: Absence of Falls  Outcome: Ongoing (interventions implemented as appropriate)  Goal: Absence of Falls  Outcome: Ongoing (interventions implemented as appropriate)    Problem: Infection, Risk/Actual (Adult)  Goal: Infection Prevention/Resolution  Outcome: Ongoing (interventions implemented as appropriate)

## 2017-02-15 NOTE — PROGRESS NOTES
" LOS: 2 days     Name: Aime Hernandez  Age: 71 y.o.  Sex: male  :  1945  MRN: [id number]         Primary Care Physician: JANIE Urena    Subjective   Subjective  Confused and disoriented last night.  Back to baseline today.  Family states there is some developing memory impairment going on at home.    Objective   Vital Signs  Temp:  [97.2 °F (36.2 °C)-100.1 °F (37.8 °C)] 97.2 °F (36.2 °C)  Heart Rate:  [] 106  Resp:  [18] 18  BP: (122-165)/(95-99) 122/96  Body mass index is 30.42 kg/(m^2).    Objective:  General Appearance:  Comfortable and in no acute distress.    Vital signs: (most recent): Blood pressure 122/96, pulse 106, temperature 97.2 °F (36.2 °C), temperature source Oral, resp. rate 18, height 69\" (175.3 cm), weight 206 lb (93.4 kg), SpO2 99 %.    Lungs:  Normal respiratory rate and normal effort.    Heart: Normal rate.  Regular rhythm.    Abdomen: Abdomen is soft.  Bowel sounds are normal.   There is no abdominal tenderness.     Extremities: There is no local swelling or dependent edema.    Neurological: Patient is alert and oriented to person, place and time.    Skin:  Warm and dry.              Results Review:       I reviewed the patient's new clinical results.      Results from last 7 days  Lab Units 02/15/17  0800 17  0626 17  0906   WBC 10*3/mm3 8.60 8.86 7.95   HEMOGLOBIN g/dL 12.4* 11.4* 13.0*   PLATELETS 10*3/mm3 130* 125* 121*       Results from last 7 days  Lab Units 02/15/17  0800 17  0626 17  0906   SODIUM mmol/L 141 140 142   POTASSIUM mmol/L 3.3* 3.6 4.2   CHLORIDE mmol/L 104 104 103   TOTAL CO2 mmol/L 24.0 26.4 26.3   BUN mg/dL 10 14 13   CREATININE mg/dL 0.59* 0.80 0.76   CALCIUM mg/dL 8.6 8.5* 9.0   GLUCOSE mg/dL 152* 152* 208*       Results from last 7 days  Lab Units 02/15/17  0800 17  0626 17  0906   INR  3.10* 2.49* 2.31*             Scheduled Meds:     atorvastatin 20 mg Oral Daily   ceftriaxone 1 g Intravenous Q24H "   insulin aspart 0-7 Units Subcutaneous 4x Daily AC & at Bedtime   insulin detemir 20 Units Subcutaneous Nightly   metoprolol tartrate 50 mg Oral BID   sertraline 50 mg Oral Nightly   warfarin 5 mg Oral Once per day on Mon Wed Fri   warfarin 7.5 mg Oral Once per day on Sun Tue Thu Sat     PRN Meds:   •  acetaminophen  •  dextrose  •  dextrose  •  glucagon (human recombinant)  •  influenza vaccine  •  ondansetron  •  ondansetron **OR** ondansetron ODT **OR** ondansetron  •  pneumococcal polysaccharide 23-valent  •  sodium chloride  •  sodium chloride  Continuous Infusions:       Assessment/Plan   Principal Problem:    Syncope and collapse  Active Problems:    Carotid artery stenosis    CAD (coronary artery disease)    Chronic a-fib    DM type 2 (diabetes mellitus, type 2)    Dysuria    UTI (urinary tract infection)      Assessment & Plan    - Rocephin started for UTI, await Ucx  - ECHO and Carotid reviewed, appear unremarkable  - Glucose stable  - Feel he is adequately hydrated at this point, will stop IVF  - Troponins downtrending, no ischemia per cardiology  - Syncope likely from combination of UTI and dehydration  - Possible d/c tomorrow or when Ucx results  - replace K today    Rafat Stevens MD  [facility]  02/15/17  10:32 AM

## 2017-02-15 NOTE — PROGRESS NOTES
"CC: SYncope    Interval History:   No complaints today.  Reviewed his echo and lab results.    Vital Signs  Temp:  [97.9 °F (36.6 °C)-100.1 °F (37.8 °C)] 98.8 °F (37.1 °C)  Heart Rate:  [83-96] 83  Resp:  [18-19] 18  BP: (132-165)/(79-99) 165/95    Intake/Output Summary (Last 24 hours) at 02/15/17 0544  Last data filed at 02/15/17 0127   Gross per 24 hour   Intake      0 ml   Output    350 ml   Net   -350 ml     Flowsheet Rows         First Filed Value    Admission Height  69\" (175.3 cm) Documented at 02/13/2017 0859    Admission Weight  200 lb (90.7 kg) Documented at 02/13/2017 0859          PHYSICAL EXAM:  General: No acute distress  Resp:NL Rate, unlabored, clear  CV:NL rate and rhythm, NL PMI, Nl S1 and S2, no Mumur, no gallop, no rub, No JVD. Normal pedal pulses  ABD:Nl sounds, no masses or tenderness, nondistended, no quarding or rebound  Neuro: alert,cooperative and oriented  Extr: No edema or cyanosis, moves all extremities      Results Review:      Results from last 7 days  Lab Units 02/14/17  0626   SODIUM mmol/L 140   POTASSIUM mmol/L 3.6   CHLORIDE mmol/L 104   TOTAL CO2 mmol/L 26.4   BUN mg/dL 14   CREATININE mg/dL 0.80   GLUCOSE mg/dL 152*   CALCIUM mg/dL 8.5*       Results from last 7 days  Lab Units 02/13/17  2356 02/13/17  1755 02/13/17  0906   TROPONIN T ng/mL 0.028 0.019 0.032*       Results from last 7 days  Lab Units 02/14/17  0626   WBC 10*3/mm3 8.86   HEMOGLOBIN g/dL 11.4*   HEMATOCRIT % 33.6*   PLATELETS 10*3/mm3 125*       Results from last 7 days  Lab Units 02/14/17  0626 02/13/17  0906   INR  2.49* 2.31*           Results from last 7 days  Lab Units 02/13/17  0906   MAGNESIUM mg/dL 1.8         @LABRCNT(bnp)@  I reviewed the patient's new clinical results.  I personally viewed and interpreted the patient's EKG/Telemetry data        Medication Review:   Meds reviewed         Assessment/Plan  1. This is a 70-year-old gentleman with syncope. Normal LV function by echo and no significant " valvular diasease. Most likely secondary to UTI /Dehydration.  He will see our nurse practitioner in 4 weeks.  He'll keep his appointment with me in June and to call back for problems.  He has another episode will need to consider loop recorder.  2. history a of severe coronary disease status post coronary artery bypass grafting, mild left ventricular systolic dysfunction, left ventricular ejection fraction of 45%. However, echocardiogram normal left ventricular ejection fraction was 61% with some inferior hypokinesis. He has occlusion of the vein graft after the posterior descending artery to the left ventricular branch, ischemic areas being the marginal left ventricular branch but now doing well on medical therapy.  Normal perfusion stress test June 2016.  2.  Chronic atrial fibrillation.   • The patient's CHADS2-VASc score is 6 Continue the same.  3. History of diabetes mellitus followed in your office.   4. Hypertension. His blood pressure is adequately controlled.   5. Hyperlipidemia on atorvastatin. Target LDL would be 70 or less    6. Carotid artery stenosis with carotid artery stenting of the left internal carotid artery. This could have accounted for his recurrent TIAs despite his therapeutic INR.  Carotid ultrasound pending.  7. Equivocal troponin no ECG changes no ischemic symptoms.    Sea Sherwood MD  02/15/17  5:44 AM

## 2017-02-15 NOTE — PLAN OF CARE
Problem: Patient Care Overview (Adult)  Goal: Plan of Care Review    02/15/17 0958   Coping/Psychosocial Response Interventions   Plan Of Care Reviewed With patient   Patient Care Overview   Progress progress toward functional goals is gradual   Outcome Evaluation   Outcome Summary/Follow up Plan Pt tolerated increased gait distance but continues to demonstrate decreased balance requiring assist for safety with gait.

## 2017-02-15 NOTE — PLAN OF CARE
Problem: Patient Care Overview (Adult)  Goal: Plan of Care Review  Outcome: Ongoing (interventions implemented as appropriate)    02/15/17 0958 02/15/17 1625   Coping/Psychosocial Response Interventions   Plan Of Care Reviewed With patient --    Patient Care Overview   Progress --  improving   Outcome Evaluation   Outcome Summary/Follow up Plan --  Pt alert and oriented today. Patient sat up in chair today. PT working with. Possible discharge home tomorrow       Goal: Adult Individualization and Mutuality  Outcome: Ongoing (interventions implemented as appropriate)    Problem: Fall Risk (Adult)  Goal: Absence of Falls  Outcome: Ongoing (interventions implemented as appropriate)    Problem: Infection, Risk/Actual (Adult)  Goal: Infection Prevention/Resolution  Outcome: Ongoing (interventions implemented as appropriate)

## 2017-02-15 NOTE — PROGRESS NOTES
Acute Care - Physical Therapy Treatment Note  [facility]     Patient Name: Aime Hernandez  : 1945  MRN: [id number]  Today's Date: 2/15/2017             Admit Date: 2017    Visit Dx:    ICD-10-CM ICD-9-CM   1. Syncope and collapse R55 780.2   2. Head injury, initial encounter S09.90XA 959.01   3. Amnesia R41.3 780.93   4. Difficulty walking R26.2 719.7     Patient Active Problem List   Diagnosis   • B12 deficiency   • Thrombocytopenia   • Carotid artery stenosis   • Syncope and collapse   • CAD (coronary artery disease)   • Chronic a-fib   • DM type 2 (diabetes mellitus, type 2)   • Dysuria   • UTI (urinary tract infection)               Adult Rehabilitation Note       02/15/17 0956          Rehab Assessment/Intervention    Discipline physical therapy assistant  -DM      Document Type therapy note (daily note)  -DM      Subjective Information agree to therapy  -DM      Patient Effort, Rehab Treatment good  -DM      Precautions/Limitations fall precautions  -DM      Recorded by [DM] Olegario Kurtz PTA      Pain Assessment    Pain Assessment No/denies pain  -DM      Recorded by [DM] Olegario Kurtz PTA      Bed Mobility, Assessment/Treatment    Bed Mob, Supine to Sit, [place] not tested  -DM      Bed Mob, Sit to Supine, [place] not tested  -DM      Recorded by [DM] Olegario Kurtz PTA      Transfer Assessment/Treatment    Transfers, Sit-Stand [place] contact guard assist  -DM      Transfers, Stand-Sit [place] stand by assist  -DM      Recorded by [DM] Olegario Kurtz PTA      Gait Assessment/Treatment    Gait, [place] Level contact guard assist;minimum assist (75% patient effort)  -DM      Gait, Assistive Device --   No AD  -DM      Gait, Distance (Feet) 160  -DM      Gait, Gait Deviations tamika decreased;narrow base;step length decreased;stride length decreased;toe-to-floor clearance decreased;weight-shifting ability decreased  -DM      Gait, Safety Issues  balance decreased during turns  -DM      Gait, Comment LOB x 2, corrected with min assist  -DM      Recorded by [MARTINEZ] Olegario Kurtz PTA      Therapy Exercises    Bilateral Lower Extremities AROM:;20 reps;sitting  -DM      Recorded by [MARTINEZ] Olegario Kurtz PTA      Positioning and Restraints    Pre-Treatment Position sitting in chair/recliner  -DM      Post Treatment Position chair  -DM      In Chair sitting;call light within reach;encouraged to call for assist;exit alarm on  -DM      Recorded by [MARTINEZ] Olegario Kurtz PTA        User Key  (r) = Recorded By, (t) = Taken By, (c) = Cosigned By    Initials Name Effective Dates    DM Olegario Kurtz PTA 05/18/15 -                 IP PT Goals       02/14/17 1333          Bed Mobility PT LTG    Bed Mobility PT LTG, Date Established 02/14/17  -KH      Bed Mobility PT LTG, Time to Achieve 1 wk  -KH      Bed Mobility PT LTG, Activity Type all bed mobility  -KH      Bed Mobility PT LTG, [place] Level independent  -KH      Transfer Training PT LTG    Transfer Training PT LTG, Date Established 02/14/17  -KH      Transfer Training PT LTG, Time to Achieve 1 wk  -KH      Transfer Training PT LTG, Activity Type all transfers  -KH      Transfer Training PT LTG, [place] Level independent  -KH      Transfer Training PT LTG, Assist Device cane, straight  -KH      Gait Training PT LTG    Gait Training Goal PT LTG, Date Established 02/14/17  -KH      Gait Training Goal PT LTG, Time to Achieve 1 wk  -KH      Gait Training Goal PT LTG, [place] Level independent  -KH      Gait Training Goal PT LTG, Assist Device cane, straight  -KH      Gait Training Goal PT LTG, Distance to Achieve 150 ft  -KH      Patient Education PT LTG    Patient Education PT LTG, Date Established 02/14/17  -KH      Patient Education PT LTG, Time to Achieve 1 wk  -KH      Patient Education PT LTG, Education Type HEP  -KH      Patient Education PT LTG, Education Understanding demonstrate  adequately  -        User Key  (r) = Recorded By, (t) = Taken By, (c) = Cosigned By    Initials Name Provider Type    KASH Disla, PT Physical Therapist          Physical Therapy Education     Title: PT OT SLP Therapies (Done)     Topic: Physical Therapy (Done)     Point: Mobility training (Done)    Learning Progress Summary    Learner Readiness Method Response Comment Documented by Status   Patient Acceptance E,D VU   02/15/17 0958 Done    Acceptance E [facility] 02/14/17 1333 Done               Point: Home exercise program (Done)    Learning Progress Summary    Learner Readiness Method Response Comment Documented by Status   Patient Acceptance E,D [facility] 02/15/17 0958 Done    Acceptance E [facility] 02/14/17 1333 Done               Point: Body mechanics (Done)    Learning Progress Summary    Learner Readiness Method Response Comment Documented by Status   Patient Acceptance E,D [facility] 02/15/17 0958 Done               Point: Precautions (Done)    Learning Progress Summary    Learner Readiness Method Response Comment Documented by Status   Patient Acceptance E,D [facility] 02/15/17 0958 Done                      User Key     Initials Effective Dates Name Provider Type [facility] 05/18/15 -  Karo Disla, PT Physical Therapist PT     05/18/15 -  Olegario Kurtz PTA Physical Therapy Assistant PT                    PT Recommendation and Plan  Anticipated Discharge Disposition: home with assist  Planned Therapy Interventions: balance training, bed mobility training, gait training, home exercise program, stair training, transfer training  PT Frequency: daily  Plan of Care Review  Plan Of Care Reviewed With: patient  Progress: progress toward functional goals is gradual  Outcome Summary/Follow up Plan: Pt tolerated increased gait distance but continues to demonstrate decreased balance requiring assist for safety with gait.          Outcome Measures       02/15/17 0900 02/14/17 1115       How  much help from another person do you currently need...    Turning from your back to your side while in flat bed without using bedrails? 4  -DM 4  -KH     Moving from lying on back to sitting on the side of a flat bed without bedrails? 4  -DM 4  -KH     Moving to and from a bed to a chair (including a wheelchair)? 3  -DM 3  -KH     Standing up from a chair using your arms (e.g., wheelchair, bedside chair)? 3  -DM 3  -KH     Climbing 3-5 steps with a railing? 3  -DM 3  -KH     To walk in hospital room? 3  -DM 3  -KH     AM-PAC 6 Clicks Score 20  -DM 20  -KH     Functional Assessment    Outcome Measure Options AM-PAC 6 Clicks Basic Mobility (PT)  -DM AM-PAC 6 Clicks Basic Mobility (PT)  -KH       User Key  (r) = Recorded By, (t) = Taken By, (c) = Cosigned By    Initials Name Provider Type     Karo Disla, PT Physical Therapist    MARTINEZ Kurtz PTA Physical Therapy Assistant           Time Calculation:         PT Charges       02/15/17 0959          Time Calculation    Start Time 0946  -DM      Stop Time 0959  -DM      Time Calculation (min) 13 min  -DM      PT Received On 02/15/17  -DM      PT - Next Appointment 02/16/17  -DM        User Key  (r) = Recorded By, (t) = Taken By, (c) = Cosigned By    Initials Name Provider Type    MARTINEZ Kurtz PTA Physical Therapy Assistant          Therapy Charges for Today     Code Description Service Date Service Provider Modifiers Qty    30105758026 HC PT THER PROC EA 15 MIN 2/15/2017 Olegario Kurtz PTA GP 1          PT G-Codes  Outcome Measure Options: AM-PAC 6 Clicks Basic Mobility (PT)    Olegario Kurtz PTA  2/15/2017

## 2017-02-15 NOTE — PLAN OF CARE
Problem: Patient Care Overview (Adult)  Goal: Plan of Care Review  Outcome: Ongoing (interventions implemented as appropriate)    02/14/17 1614 02/15/17 0345   Coping/Psychosocial Response Interventions   Plan Of Care Reviewed With patient --    Patient Care Overview   Progress improving --    Outcome Evaluation   Outcome Summary/Follow up Plan --  probable sundowners, recent fall, iv rocephin, echo= valvular regurg, ac/hs, plan d/c home       Goal: Adult Individualization and Mutuality  Outcome: Ongoing (interventions implemented as appropriate)  Goal: Discharge Needs Assessment  Outcome: Ongoing (interventions implemented as appropriate)    Problem: Fall Risk (Adult)  Goal: Absence of Falls  Outcome: Ongoing (interventions implemented as appropriate)    Problem: Infection, Risk/Actual (Adult)  Goal: Infection Prevention/Resolution  Outcome: Ongoing (interventions implemented as appropriate)

## 2017-02-16 PROBLEM — R30.0 DYSURIA: Status: RESOLVED | Noted: 2017-01-01 | Resolved: 2017-01-01

## 2017-02-16 PROBLEM — N39.0 UTI (URINARY TRACT INFECTION): Status: RESOLVED | Noted: 2017-01-01 | Resolved: 2017-01-01

## 2017-02-16 PROBLEM — R55 SYNCOPE AND COLLAPSE: Status: RESOLVED | Noted: 2017-01-01 | Resolved: 2017-01-01

## 2017-02-16 NOTE — PROGRESS NOTES
" LOS: 3 days     Name: Aime Hernandez  Age: 71 y.o.  Sex: male  :  1945  MRN: [id number]         Primary Care Physician: JANIE Urena    Subjective   Subjective  No new complaints.  Feels well    Objective   Vital Signs  Temp:  [97.2 °F (36.2 °C)-98.3 °F (36.8 °C)] 98.3 °F (36.8 °C)  Heart Rate:  [] 79  Resp:  [16-18] 16  BP: (114-146)/(80-96) 146/88  Body mass index is 31.09 kg/(m^2).    Objective:  General Appearance:  Comfortable and in no acute distress.    Vital signs: (most recent): Blood pressure 146/88, pulse 79, temperature 98.3 °F (36.8 °C), temperature source Oral, resp. rate 16, height 69\" (175.3 cm), weight 210 lb 8 oz (95.5 kg), SpO2 97 %.    Lungs:  Normal respiratory rate and normal effort.    Heart: Normal rate.  Regular rhythm.    Abdomen: Abdomen is soft.  Bowel sounds are normal.   There is no abdominal tenderness.     Extremities: There is no local swelling or dependent edema.    Neurological: Patient is alert and oriented to person, place and time.    Skin:  Warm and dry.              Results Review:       I reviewed the patient's new clinical results.      Results from last 7 days  Lab Units 17  0711 02/15/17  0800 17  0617  0906   WBC 10*3/mm3 9.74 8.60 8.86 7.95   HEMOGLOBIN g/dL 13.3* 12.4* 11.4* 13.0*   PLATELETS 10*3/mm3 142 130* 125* 121*       Results from last 7 days  Lab Units 17  0711 02/15/17  2120 02/15/17  0800 17  0626 17  0906   SODIUM mmol/L 142  --  141 140 142   POTASSIUM mmol/L 3.7 4.1 3.3* 3.6 4.2   CHLORIDE mmol/L 102  --  104 104 103   TOTAL CO2 mmol/L 22.9  --  24.0 26.4 26.3   BUN mg/dL 14  --  10 14 13   CREATININE mg/dL 0.68*  --  0.59* 0.80 0.76   CALCIUM mg/dL 9.3  --  8.6 8.5* 9.0   GLUCOSE mg/dL 117*  --  152* 152* 208*       Results from last 7 days  Lab Units 17  0711 02/15/17  0800 17  0626 17  0906   INR  2.96* 3.10* 2.49* 2.31*             Scheduled Meds:     atorvastatin 20 " mg Oral Daily   ceftriaxone 1 g Intravenous Q24H   insulin aspart 0-7 Units Subcutaneous 4x Daily AC & at Bedtime   insulin detemir 20 Units Subcutaneous Nightly   metoprolol tartrate 50 mg Oral BID   sertraline 50 mg Oral Nightly   warfarin 5 mg Oral Once per day on Mon Wed Fri   warfarin 7.5 mg Oral Once per day on Sun Tue Thu Sat     PRN Meds:   •  acetaminophen  •  dextrose  •  dextrose  •  glucagon (human recombinant)  •  influenza vaccine  •  ondansetron  •  ondansetron **OR** ondansetron ODT **OR** ondansetron  •  pneumococcal polysaccharide 23-valent  •  potassium chloride  •  sodium chloride  •  sodium chloride  Continuous Infusions:       Assessment/Plan   Principal Problem:    Syncope and collapse  Active Problems:    Carotid artery stenosis    CAD (coronary artery disease)    Chronic a-fib    DM type 2 (diabetes mellitus, type 2)    Dysuria    UTI (urinary tract infection)      Assessment & Plan    - Still awaiting final urine culture results.  Will check back later today and if full results are back then will adjust to oral abx and discharge him home.    Rafat Stevens MD  [facility]  02/16/17  8:35 AM

## 2017-02-16 NOTE — PROGRESS NOTES
Continued Stay Note  [facility]     Patient Name: Aime Hernandez  MRN: [id number]  Today's Date: 2/16/2017    Admit Date: 2/13/2017          Discharge Plan       02/16/17 1551    Case Management/Social Work Plan    Plan Return home    Patient/Family In Agreement With Plan yes    Additional Comments Spoke with patient at bedside.  He confirms that he will return home at DC and his wife and daughter can help him if needed.  CCP will continue to follow.              Discharge Codes     None        Expected Discharge Date and Time     Expected Discharge Date Expected Discharge Time    Feb 16, 2017             Becky S. [place], RN

## 2017-02-16 NOTE — DISCHARGE SUMMARY
Date of Admission: 2/13/2017  Date of Discharge:  2/16/2017  Primary Care Physician: Levi Peterson, PA     Discharge Diagnosis:  Principal Problem:    Syncope and collapse  Active Problems:    Carotid artery stenosis    CAD (coronary artery disease)    Chronic a-fib    DM type 2 (diabetes mellitus, type 2)    Dysuria    UTI (urinary tract infection)      DETAILS OF HOSPITAL STAY     Pertinent Test Results and Procedures Performed    CT scan of the head showed no acute intracranial abnormality.  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     urine culture grew greater than [id number]  Aerococcus urinae    Transthoracic echocardiogram:  · Left ventricular function is normal. Calculated EF = 56.7%. Estimated EF was in agreement with the calculated EF. Estimated EF = 57%. Normal left ventricular cavity size noted. All left ventricular wall segments contract normally. Left ventricular wall thickness is consistent with borderline concentric hypertrophy. Septal wall motion is normal.  · The aortic valve is abnormal in structure. There is mild thickening of the aortic valve. No aortic valve regurgitation is present. No aortic valve stenosis is present.  · There is nodular sclerosis of the antrum mitral leaflet this is not well visualized. Trace mitral valve regurgitation is present. No significant mitral valve stenosis is present.  · Trace tricuspid valve regurgitation is present. Estimated right ventricular systolic pressure from tricuspid regurgitation is normal (<35 mmHg).    Carotid Doppler:  No hemodynamically significant stenosis in the either carotid system.    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  Please see H&P for full details of admission.  Upon presentation he describes dysuria and was found to have a urinary tract infection and was dehydrated.  This was found to be the etiology of his  syncope.  He also had echocardiogram and carotid Doppler as described above.  He was hydrated and is now euvolemic.  He was evaluated by cardiology who agreed with treatment course.  The patient was initially started on Rocephin but has been changed to amoxicillin that he will take at home once urine culture grew Aerococcus.  He is feeling well today.  He had a mild encephalopathy on admission that is now resolved.  He has worked with physical therapy and deemed appropriate to return home. Of note, the patient was on numerous diabetic medications including for oral medications as well as insulin.  I have eliminated his Januvia and Onglyza.  His blood sugars have remained stable on 20 units of Levemir at night time.  I will keep him on this and restart his metformin as this should not cause any additional hypoglycemia.   he will follow-up with his primary care physician in one to 2 weeks and can have additional titration of diabetic medications at that time.  He will be released today in stable condition.     Consults:   Consults     Date and Time Order Name Status Description    2/13/2017 1404 Inpatient Consult to Cardiology Completed     2/13/2017 1055 [facility] (on-call MD unless specified) Completed             Condition on Discharge: Stable    Discharge Disposition  Home or Self Care    Discharge Medications   Aime Hernandez   Home Medication Instructions JOHN:[id number]    Printed on:02/16/17 1422   Medication Information                      acetaminophen (TYLENOL) 325 MG tablet  Take 650 mg by mouth Every 6 (Six) Hours As Needed for mild pain (1-3).             amoxicillin (AMOXIL) 500 MG capsule  Take 1 capsule by mouth 3 (Three) Times a Day for 5 days.             atorvastatin (LIPITOR) 20 MG tablet  Take 20 mg by mouth daily.             B-D ULTRAFINE III SHORT PEN 31G X 8 MM misc               Bioflavonoid Products (VITAMIN C PLUS PO)  Take  by mouth Daily.             glipiZIDE (GLUCOTROL) 10 MG  tablet  Take 10 mg by mouth. Take 2 tabs twice daily             insulin glargine (LANTUS) 100 UNIT/ML injection  Inject 20 Units under the skin daily.             metFORMIN (GLUCOPHAGE) 1000 MG tablet  1,000 mg Daily With Breakfast.             metoprolol tartrate (LOPRESSOR) 50 MG tablet  Take 50 mg by mouth 2 (Two) Times a Day.             nitroglycerin (NITROSTAT) 0.4 MG SL tablet  Place 1 tablet under the tongue every 5 (five) minutes as needed for chest pain.             sertraline (ZOLOFT) 50 MG tablet  50 mg Every Night.             VITAMIN E PO  Take  by mouth Daily.             warfarin (COUMADIN) 5 MG tablet  Take 5 mg by mouth 3 (Three) Times a Week. Monday, Wednesday, Friday             warfarin (COUMADIN) 7.5 MG tablet  Take 7.5 mg by mouth 4 (Four) Times a Week. Sunday, Tuesday, Thursday, Saturday               **Correction** -  the patient's glipizide will be discontinued at discharge.  Please disregard it as listed above.        Discharge Diet:   Diet Instructions     Diet: Regular, Consistent Carbohydrate, Cardiac; Thin Liquids, No Restrictions       Discharge Diet:   Regular  Consistent Carbohydrate  Cardiac      Fluid Consistency:  Thin Liquids, No Restrictions                 Activity at Discharge:   Activity Instructions     Activity as Tolerated                     Follow-up Appointments  Future Appointments  Date Time Provider Department Center   2/21/2017 10:45 AM CHAIR 4 [facility] INFUS LAG LAG   3/15/2017 9:30 AM TARIQ Aranda [facility] CD LCGKR None   3/21/2017 10:30 AM CHAIR 4 [facility] INFUS LAG LAG   4/18/2017 10:30 AM [facility]   4/18/2017 10:40 AM Basilio Briggs MD [facility] CBC [place]   4/18/2017 11:00 AM CHAIR 4 [facility] INFUS LAG LAG   6/19/2017 10:00 AM Sea Sherwood MD [facility] CD LCGEP None     Referrals and Follow-ups to Schedule     Follow-Up    As directed    In 1-2 weeks   Follow Up Details:  With your primary care                        I have examined and discussed discharge planning with the patient today.     Rafat Stevens MD  02/16/17  2:22 PM    Time: Discharge 34 min

## 2017-02-16 NOTE — PLAN OF CARE
Problem: Patient Care Overview (Adult)  Goal: Plan of Care Review  Outcome: Ongoing (interventions implemented as appropriate)    02/15/17 1625 02/16/17 0420   Coping/Psychosocial Response Interventions   Plan Of Care Reviewed With --  patient   Patient Care Overview   Progress improving --    Outcome Evaluation   Outcome Summary/Follow up Plan --  Pt very confused about time tonight. Pt triggering bed and chair alarm repeatedly tonight. Strict falls precautions. Possible DC in am.       Goal: Discharge Needs Assessment  Outcome: Ongoing (interventions implemented as appropriate)    Problem: Fall Risk (Adult)  Goal: Absence of Falls  Outcome: Ongoing (interventions implemented as appropriate)  Goal: Absence of Falls  Outcome: Ongoing (interventions implemented as appropriate)    Problem: Infection, Risk/Actual (Adult)  Goal: Infection Prevention/Resolution  Outcome: Ongoing (interventions implemented as appropriate)

## 2017-02-16 NOTE — PROGRESS NOTES
Acute Care - Physical Therapy Treatment Note  [facility]     Patient Name: Aime Hernandez  : 1945  MRN: [id number]  Today's Date: 2017             Admit Date: 2017    Visit Dx:    ICD-10-CM ICD-9-CM   1. Syncope and collapse R55 780.2   2. Head injury, initial encounter S09.90XA 959.01   3. Amnesia R41.3 780.93   4. Difficulty walking R26.2 719.7     Patient Active Problem List   Diagnosis   • B12 deficiency   • Thrombocytopenia   • Carotid artery stenosis   • Syncope and collapse   • CAD (coronary artery disease)   • Chronic a-fib   • DM type 2 (diabetes mellitus, type 2)   • Dysuria   • UTI (urinary tract infection)               Adult Rehabilitation Note       17 1118 02/15/17 0956       Rehab Assessment/Intervention    Discipline physical therapy assistant  -RW physical therapy assistant  -DM     Document Type therapy note (daily note)  -RW therapy note (daily note)  -DM     Subjective Information agree to therapy;no complaints  -RW agree to therapy  -DM     Patient Effort, Rehab Treatment good  -RW good  -DM     Precautions/Limitations fall precautions  -RW fall precautions  -DM     Recorded by [RW] Kirstie Cunningham PTA [DM] Olegario Kurtz PTA     Pain Assessment    Pain Assessment No/denies pain  -RW No/denies pain  -DM     Recorded by [RW] Kirstie Cunningham PTA [DM] Olegario Kurtz PTA     Cognitive Assessment/Intervention    Current Cognitive/Communication Assessment functional  -RW      Orientation Status oriented x 4  -RW      Follows Commands/Answers Questions 100% of the time  -RW      Personal Safety mild impairment;decreased insight to deficits  -RW      Personal Safety Interventions fall prevention program maintained;gait belt;nonskid shoes/slippers when out of bed  -RW      Recorded by [RW] Kirstie Cunningham PTA      Bed Mobility, Assessment/Treatment    Bed Mobility, Assistive Device bed rails  -RW      Bed Mob, Supine to Sit, [place] supervision required  -RW  not tested  -DM     Bed Mob, Sit to Supine, [place] not tested   Pt up in chair  -RW not tested  -DM     Recorded by [RW] Kirstie Cunningham PTA [DM] Olegario Kurtz PTA     Transfer Assessment/Treatment    Transfers, Sit-Stand [place] verbal cues required;stand by assist  -RW contact guard assist  -DM     Transfers, Stand-Sit [place] stand by assist  -RW stand by assist  -DM     Recorded by [RW] Kirstie Cunningham PTA [DM] Olegario Kurtz PTA     Gait Assessment/Treatment    Gait, [place] Level verbal cues required;stand by assist;contact guard assist  -RW contact guard assist;minimum assist (75% patient effort)  -DM     Gait, Assistive Device  --   No AD  -DM     Gait, Distance (Feet) 150  -  -DM     Gait, Gait Deviations forward flexed posture;bilateral:;tamika decreased;narrow base  -RW tamika decreased;narrow base;step length decreased;stride length decreased;toe-to-floor clearance decreased;weight-shifting ability decreased  -DM     Gait, Safety Issues balance decreased during turns  -RW balance decreased during turns  -DM     Gait, Comment Lateral sway noted, but no LOB. Does have a cane at home  -RW LOB x 2, corrected with min assist  -DM     Recorded by [RW] Kirstie Cunningham PTA [DM] Olegario Kurtz PTA     Therapy Exercises    Bilateral Lower Extremities  AROM:;20 reps;sitting  -DM     Recorded by  [DM] Olegario Kurzt PTA     Positioning and Restraints    Pre-Treatment Position in bed  -RW sitting in chair/recliner  -DM     Post Treatment Position chair  -RW chair  -DM     In Chair reclined;call light within reach;encouraged to call for assist;exit alarm on  -RW sitting;call light within reach;encouraged to call for assist;exit alarm on  -DM     Recorded by [RW] Kirstie Cunningham PTA [DM] Olegario Kurtz PTA       User Key  (r) = Recorded By, (t) = Taken By, (c) = Cosigned By    Initials Name Effective Dates    MARTINEZ Kurtz PTA 05/18/15 -     RW Kirstie EUGENE  Marisa, PTA 04/06/16 -                 IP PT Goals       02/14/17 1333          Bed Mobility PT LTG    Bed Mobility PT LTG, Date Established 02/14/17  -KH      Bed Mobility PT LTG, Time to Achieve 1 wk  -KH      Bed Mobility PT LTG, Activity Type all bed mobility  -KH      Bed Mobility PT LTG, [place] Level independent  -KH      Transfer Training PT LTG    Transfer Training PT LTG, Date Established 02/14/17  -KH      Transfer Training PT LTG, Time to Achieve 1 wk  -KH      Transfer Training PT LTG, Activity Type all transfers  -KH      Transfer Training PT LTG, [place] Level independent  -KH      Transfer Training PT LTG, Assist Device cane, straight  -KH      Gait Training PT LTG    Gait Training Goal PT LTG, Date Established 02/14/17  -KH      Gait Training Goal PT LTG, Time to Achieve 1 wk  -KH      Gait Training Goal PT LTG, [place] Level independent  -KH      Gait Training Goal PT LTG, Assist Device cane, straight  -KH      Gait Training Goal PT LTG, Distance to Achieve 150 ft  -KH      Patient Education PT LTG    Patient Education PT LTG, Date Established 02/14/17  -KH      Patient Education PT LTG, Time to Achieve 1 wk  -KH      Patient Education PT LTG, Education Type HEP  -KH      Patient Education PT LTG, Education Understanding demonstrate adequately  -KH        User Key  (r) = Recorded By, (t) = Taken By, (c) = Cosigned By    Initials Name Provider Type    KASH Disla, PT Physical Therapist          Physical Therapy Education     Title: PT OT SLP Therapies (Done)     Topic: Physical Therapy (Done)     Point: Mobility training (Done)    Learning Progress Summary    Learner Readiness Method Response Comment Documented by Status   Patient Acceptance E ANABELLE,NR  RW 02/16/17 1252 Done    Acceptance E,D ANABELLE  DM 02/15/17 0958 Done    Acceptance E ANABELLE  KH 02/14/17 1333 Done               Point: Home exercise program (Done)    Learning Progress Summary    Learner Readiness Method Response  Comment Documented by Status   Patient Acceptance E,D VU  DM 02/15/17 0958 Done    Acceptance E VU   02/14/17 1333 Done               Point: Body mechanics (Done)    Learning Progress Summary    Learner Readiness Method Response Comment Documented by Status   Patient Acceptance E VU,NR  RW 02/16/17 1252 Done    Acceptance E,D VU  DM 02/15/17 0958 Done               Point: Precautions (Done)    Learning Progress Summary    Learner Readiness Method Response Comment Documented by Status   Patient Acceptance E VU,NR   02/16/17 1252 Done    Acceptance E,D VU  DM 02/15/17 0958 Done                      User Key     Initials Effective Dates Name Provider Type Discipline     05/18/15 -  Karo Disla, PT Physical Therapist PT     05/18/15 -  Olegario Kurtz, PTA Physical Therapy Assistant PT     04/06/16 -  Kirstie Cunningham PTA Physical Therapy Assistant PT                    PT Recommendation and Plan  Anticipated Discharge Disposition: home with assist  Planned Therapy Interventions: balance training, bed mobility training, gait training, home exercise program, stair training, transfer training  PT Frequency: daily  Plan of Care Review  Plan Of Care Reviewed With: patient  Outcome Summary/Follow up Plan: Pt tolerated PT well. Does have a cane at home and encouraged pt to use for a period of time upon d/c. Lateral sway noted w/ gait, but no LOB.          Outcome Measures       02/16/17 1100 02/15/17 0900 02/14/17 1115    How much help from another person do you currently need...    Turning from your back to your side while in flat bed without using bedrails? 4  -RW 4  -DM 4  -KH    Moving from lying on back to sitting on the side of a flat bed without bedrails? 4  -RW 4  -DM 4  -KH    Moving to and from a bed to a chair (including a wheelchair)? 3  -RW 3  -DM 3  -KH    Standing up from a chair using your arms (e.g., wheelchair, bedside chair)? 3  -RW 3  -DM 3  -KH    Climbing 3-5 steps with a railing?  3  -RW 3  -DM 3  -KH    To walk in hospital room? 3  -RW 3  -DM 3  -KH    AM-PAC 6 Clicks Score 20  -RW 20  -DM 20  -KH    Functional Assessment    Outcome Measure Options AM-PAC 6 Clicks Basic Mobility (PT)  -RW AM-PAC 6 Clicks Basic Mobility (PT)  -DM AM-PAC 6 Clicks Basic Mobility (PT)  -KH      User Key  (r) = Recorded By, (t) = Taken By, (c) = Cosigned By    Initials Name Provider Type     Karo Disla, PT Physical Therapist    MARTINEZ Kurtz, PTA Physical Therapy Assistant    UDAY Cunningham PTA Physical Therapy Assistant           Time Calculation:         PT Charges       02/16/17 1148          Time Calculation    Start Time 1118  -RW      Stop Time 1126  -RW      Time Calculation (min) 8 min  -RW      PT Received On 02/16/17  -      PT - Next Appointment 02/17/17  -        User Key  (r) = Recorded By, (t) = Taken By, (c) = Cosigned By    Initials Name Provider Type     Kirstie Cunningham PTA Physical Therapy Assistant          Therapy Charges for Today     Code Description Service Date Service Provider Modifiers Qty    20842645321 HC PT THER PROC EA 15 MIN 2/16/2017 Kirstie Cunningham PTA GP 1          PT G-Codes  Outcome Measure Options: AM-PAC 6 Clicks Basic Mobility (PT)    Kirstie Cunningham PTA  2/16/2017

## 2017-02-16 NOTE — PLAN OF CARE
Problem: Patient Care Overview (Adult)  Goal: Plan of Care Review  Outcome: Ongoing (interventions implemented as appropriate)    02/16/17 1252   Coping/Psychosocial Response Interventions   Plan Of Care Reviewed With patient   Outcome Evaluation   Outcome Summary/Follow up Plan Pt tolerated PT well. Does have a cane at home and encouraged pt to use for a period of time upon d/c. Lateral sway noted w/ gait, but no LOB.

## 2017-02-17 NOTE — PLAN OF CARE
Problem: Inpatient Physical Therapy  Goal: Bed Mobility Goal LTG- PT  Outcome: Unable to achieve outcome(s) by discharge Date Met:  02/17/17 02/17/17 0815   Bed Mobility PT LTG   Bed Mobility PT LTG, Activity Type all bed mobility   Bed Mobility PT LTG, [place] Level supervision required   Bed Mobility PT LTG, Outcome goal not met   Bed Mobility PT LTG, Reason Goal Not Met discharged from facility       Goal: Transfer Training Goal 1 LTG- PT  Outcome: Unable to achieve outcome(s) by discharge Date Met:  02/17/17 02/17/17 0815   Transfer Training PT LTG   Transfer Training PT LTG, Activity Type all transfers   Transfer Training PT LTG, [place] Level contact guard assist  (sba)   Transfer Training PT LTG, Outcome goal not met   Transfer Training PT LTG, Reason Goal Not Met discharged from facility       Goal: Gait Training Goal LTG- PT  Outcome: Unable to achieve outcome(s) by discharge Date Met:  02/17/17 02/17/17 0815   Gait Training PT LTG   Gait Training Goal PT LTG, [place] Level contact guard assist   Gait Training Goal PT LTG, Distance to Achieve 150   Gait Training Goal PT LTG, Outcome goal not met   Gait Training Goal PT LTG, Reason Goal Not Met discharged from facility       Goal: Patient Education Goal LTG- PT  Outcome: Unable to achieve outcome(s) by discharge Date Met:  02/17/17 02/17/17 0815   Patient Education PT LTG   Patient Education PT LTG Outcome goal not met   Patient Education PT LTG, Reason Goal Not Met discharged from facility

## 2017-02-17 NOTE — THERAPY DISCHARGE NOTE
Acute Care - Physical Therapy Treatment Note/Discharge  [facility]     Patient Name: Aime Hernandez  : 1945  MRN: [id number]  Today's Date: 2017             Admit Date: 2017    Visit Dx:    ICD-10-CM ICD-9-CM   1. Syncope and collapse R55 780.2   2. Head injury, initial encounter S09.90XA 959.01   3. Amnesia R41.3 780.93   4. Difficulty walking R26.2 719.7     Patient Active Problem List   Diagnosis   • B12 deficiency   • Thrombocytopenia   • Carotid artery stenosis   • CAD (coronary artery disease)   • Chronic a-fib   • DM type 2 (diabetes mellitus, type 2)       Physical Therapy Education     Title: PT OT SLP Therapies (Resolved)     Topic: Physical Therapy (Resolved)     Point: Mobility training (Resolved)    Learning Progress Summary    Learner Readiness Method Response Comment Documented by Status   Patient Acceptance E VU,NR   17 1252 Done    Acceptance E,D VU   02/15/17 0958 Done    Acceptance E [facility] 17 1333 Done               Point: Home exercise program (Resolved)    Learning Progress Summary    Learner Readiness Method Response Comment Documented by Status   Patient Acceptance E,D VU   02/15/17 0958 Done    Acceptance E [facility] 17 1333 Done               Point: Body mechanics (Resolved)    Learning Progress Summary    Learner Readiness Method Response Comment Documented by Status   Patient Acceptance E VU,NR   17 1252 Done    Acceptance E,D VU   02/15/17 0958 Done               Point: Precautions (Resolved)    Learning Progress Summary    Learner Readiness Method Response Comment Documented by Status   Patient Acceptance E VU,NR   17 1252 Done    Acceptance E,D VU   02/15/17 0958 Done                      User Key     Initials Effective Dates Name Provider Type Discipline     05/18/15 -  Karo Disla, PT Physical Therapist PT     05/18/15 -  Olegario Kurtz PTA Physical Therapy Assistant PT     16 -  Kirstie EUGENE  Marisa, PTA Physical Therapy Assistant PT                    IP PT Goals       02/17/17 0815 02/14/17 1333       Bed Mobility PT LTG    Bed Mobility PT LTG, Date Established  02/14/17  -KH     Bed Mobility PT LTG, Time to Achieve  1 wk  -KH     Bed Mobility PT LTG, Activity Type all bed mobility  -RW all bed mobility  -KH     Bed Mobility PT LTG, [place] Level supervision required  -RW independent  -KH     Bed Mobility PT LTG, Outcome goal not met  -RW      Bed Mobility PT LTG, Reason Goal Not Met discharged from facility  -RW      Transfer Training PT LTG    Transfer Training PT LTG, Date Established  02/14/17  -KH     Transfer Training PT LTG, Time to Achieve  1 wk  -KH     Transfer Training PT LTG, Activity Type all transfers  -RW all transfers  -KH     Transfer Training PT LTG, [place] Level contact guard assist   sba  -RW independent  -KH     Transfer Training PT LTG, Assist Device  cane, straight  -KH     Transfer Training PT LTG, Outcome goal not met  -RW      Transfer Training PT LTG, Reason Goal Not Met discharged from facility  -RW      Gait Training PT LTG    Gait Training Goal PT LTG, Date Established  02/14/17  -KH     Gait Training Goal PT LTG, Time to Achieve  1 wk  -KH     Gait Training Goal PT LTG, [place] Level contact guard assist  -RW independent  -KH     Gait Training Goal PT LTG, Assist Device  cane, straight  -KH     Gait Training Goal PT LTG, Distance to Achieve 150  - ft  -KH     Gait Training Goal PT LTG, Outcome goal not met  -RW      Gait Training Goal PT LTG, Reason Goal Not Met discharged from facility  -RW      Patient Education PT LTG    Patient Education PT LTG, Date Established  02/14/17  -     Patient Education PT LTG, Time to Achieve  1 wk  -KH     Patient Education PT LTG, Education Type  HEP  -KH     Patient Education PT LTG, Education Understanding  demonstrate adequately  -KH     Patient Education PT LTG Outcome goal not met  -RW      Patient Education  PT LTG, Reason Goal Not Met discharged from facility  -RW        User Key  (r) = Recorded By, (t) = Taken By, (c) = Cosigned By    Initials Name Provider Type     Karo Disla, PT Physical Therapist    RW Kirstie Cunningham PTA Physical Therapy Assistant              Adult Rehabilitation Note       02/16/17 1118 02/15/17 0956       Rehab Assessment/Intervention    Discipline physical therapy assistant  -RW physical therapy assistant  -DM     Document Type therapy note (daily note)  -RW therapy note (daily note)  -DM     Subjective Information agree to therapy;no complaints  -RW agree to therapy  -DM     Patient Effort, Rehab Treatment good  -RW good  -DM     Precautions/Limitations fall precautions  -RW fall precautions  -DM     Recorded by [RW] Kirstie Cunningham PTA [DM] Olegario Kurtz PTA     Pain Assessment    Pain Assessment No/denies pain  -RW No/denies pain  -DM     Recorded by [RW] Kirstie Cunningham PTA [DM] Olegario Kurtz PTA     Cognitive Assessment/Intervention    Current Cognitive/Communication Assessment functional  -RW      Orientation Status oriented x 4  -RW      Follows Commands/Answers Questions 100% of the time  -RW      Personal Safety mild impairment;decreased insight to deficits  -RW      Personal Safety Interventions fall prevention program maintained;gait belt;nonskid shoes/slippers when out of bed  -RW      Recorded by [RW] Kirstie Cunningham PTA      Bed Mobility, Assessment/Treatment    Bed Mobility, Assistive Device bed rails  -RW      Bed Mob, Supine to Sit, [place] supervision required  -RW not tested  -DM     Bed Mob, Sit to Supine, [place] not tested   Pt up in chair  -RW not tested  -DM     Recorded by [RW] Kirstie Cunningham PTA [DM] Olegario Kurtz PTA     Transfer Assessment/Treatment    Transfers, Sit-Stand [place] verbal cues required;stand by assist  -RW contact guard assist  -DM     Transfers, Stand-Sit [place] stand by assist  -RW stand by  assist  -DM     Recorded by [RW] Kirstie Cunningham PTA [DM] Olegario Kurtz PTA     Gait Assessment/Treatment    Gait, [place] Level verbal cues required;stand by assist;contact guard assist  -RW contact guard assist;minimum assist (75% patient effort)  -DM     Gait, Assistive Device  --   No AD  -DM     Gait, Distance (Feet) 150  -  -DM     Gait, Gait Deviations forward flexed posture;bilateral:;tamika decreased;narrow base  -RW tamika decreased;narrow base;step length decreased;stride length decreased;toe-to-floor clearance decreased;weight-shifting ability decreased  -DM     Gait, Safety Issues balance decreased during turns  -RW balance decreased during turns  -DM     Gait, Comment Lateral sway noted, but no LOB. Does have a cane at home  -RW LOB x 2, corrected with min assist  -DM     Recorded by [RW] Kirstie Cunningham PTA [DM] Olegario Kurtz PTA     Therapy Exercises    Bilateral Lower Extremities  AROM:;20 reps;sitting  -DM     Recorded by  [DM] Olegario Kurtz PTA     Positioning and Restraints    Pre-Treatment Position in bed  -RW sitting in chair/recliner  -DM     Post Treatment Position chair  -RW chair  -DM     In Chair reclined;call light within reach;encouraged to call for assist;exit alarm on  -RW sitting;call light within reach;encouraged to call for assist;exit alarm on  -DM     Recorded by [RW] Kirstie Cunningham PTA [DM] Olegario Kurtz PTA       User Key  (r) = Recorded By, (t) = Taken By, (c) = Cosigned By    Initials Name Effective Dates    MARTINEZ Kurtz PTA 05/18/15 -     UDAY Cunningham PTA 04/06/16 -           PT Recommendation and Plan  Anticipated Discharge Disposition: home with assist  Planned Therapy Interventions: balance training, bed mobility training, gait training, home exercise program, stair training, transfer training  PT Frequency: daily  Plan of Care Review  Plan Of Care Reviewed With: patient  Outcome Summary/Follow up Plan: Pt tolerated PT  well. Does have a cane at home and encouraged pt to use for a period of time upon d/c. Lateral sway noted w/ gait, but no LOB.          Outcome Measures       02/16/17 1100 02/15/17 0900 02/14/17 1115    How much help from another person do you currently need...    Turning from your back to your side while in flat bed without using bedrails? 4  -RW 4  -DM 4  -KH    Moving from lying on back to sitting on the side of a flat bed without bedrails? 4  -RW 4  -DM 4  -KH    Moving to and from a bed to a chair (including a wheelchair)? 3  -RW 3  -DM 3  -KH    Standing up from a chair using your arms (e.g., wheelchair, bedside chair)? 3  -RW 3  -DM 3  -KH    Climbing 3-5 steps with a railing? 3  -RW 3  -DM 3  -KH    To walk in hospital room? 3  -RW 3  -DM 3  -KH    AM-PAC 6 Clicks Score 20  -RW 20  -DM 20  -KH    Functional Assessment    Outcome Measure Options AM-PAC 6 Clicks Basic Mobility (PT)  -RW AM-PAC 6 Clicks Basic Mobility (PT)  -DM AM-PAC 6 Clicks Basic Mobility (PT)  -KH      User Key  (r) = Recorded By, (t) = Taken By, (c) = Cosigned By    Initials Name Provider Type    KASH Disla, PT Physical Therapist    MARTINEZ Kurtz, [place] Physical Therapy Assistant    UDAY Cunningham PTA Physical Therapy Assistant           Time Calculation:       Therapy Charges for Today     Code Description Service Date Service Provider Modifiers Qty    14084892998 HC PT THER PROC EA 15 MIN 2/16/2017 Kirstie Cunningham PTA GP 1          PT G-Codes  Outcome Measure Options: AM-PAC 6 Clicks Basic Mobility (PT)    PT Discharge Summary  Anticipated Discharge Disposition: home with assist  Reason for Discharge: Discharge from facility  Outcomes Achieved: Refer to plan of care for updates on goals achieved  Discharge Destination: Home with assist    Kirstie Cunningham PTA  2/17/2017

## 2017-03-12 PROBLEM — D62 ACUTE POSTHEMORRHAGIC ANEMIA: Status: ACTIVE | Noted: 2017-01-01

## 2017-03-12 NOTE — ED PROVIDER NOTES
" EMERGENCY DEPARTMENT ENCOUNTER    CHIEF COMPLAINT  Chief Complaint: altered mental status  History given by: EMS, patient  History limited by: patient's altered mental status  Room Number: 18/18  PMD: JANIE Urena      HPI:  Patient brought via EMS from home for altered mental status (confusion), generalized weakness, and fatigue for the last 2 days. EMS reports that pt has had URI for about 1 month. Pt reports that he has been feeling \"sick and tired\" and fell recently. There are no other complaints at this time. He denies any pain. Unable to tell me when he fell despite obvious contusion to left arm, chest and abdomen.     Location: generalized  Radiation: N/A  Quality: confusion  Intensity/Severity: moderate  Duration: for about 2 days  Onset quality: unknown  Timing: constant  Progression: worsening  Aggravating Factors: unknown  Alleviating Factors: unknown  Previous Episodes: unknown  Treatment before arrival: unknown  Associated Symptoms: generalized weakness, fatigue, URI for 1 month      PAST MEDICAL HISTORY  Active Ambulatory Problems     Diagnosis Date Noted   • B12 deficiency 02/08/2016   • Thrombocytopenia 04/25/2016   • Carotid artery stenosis 12/15/2016   • CAD (coronary artery disease) 02/13/2017   • Chronic a-fib 02/13/2017   • DM type 2 (diabetes mellitus, type 2) 02/13/2017     Resolved Ambulatory Problems     Diagnosis Date Noted   • Syncope and collapse 02/13/2017   • Dysuria 02/13/2017   • UTI (urinary tract infection) 02/14/2017     Past Medical History   Diagnosis Date   • Atrial fibrillation    • Carotid artery disease    • Coronary artery disease    • Depression    • Diabetes mellitus    • History of thrombocytopenia    • Hyperlipidemia    • Hypertension    • Old myocardial infarct 03/2015   • Stroke    • Vision loss, left eye          PAST SURGICAL HISTORY  Past Surgical History   Procedure Laterality Date   • Coronary artery bypass graft        x 3   • Back surgery     • " Carotid stent  2015   • Tonsillectomy and adenoidectomy     • Inguinal hernia repair           FAMILY HISTORY  Family History   Problem Relation Age of Onset   • Cancer Other    • Coronary artery disease Other    • Diabetes Other    • Hypertension Other    • Cancer Mother    • Diabetes Father    • Heart disease Father    • Stroke Father    • Diabetes Daughter    • Diabetes Son    • Heart disease Son          SOCIAL HISTORY  Social History     Social History   • Marital status:      Spouse name: Krystal   • Number of children: N/A   • Years of education: N/A     Occupational History   •  Retired     Social History Main Topics   • Smoking status: Former Smoker     Quit date: 1995   • Smokeless tobacco: Former User      Comment: Quit 25 yrs ago   • Alcohol use No      Comment: quit in 1995   • Drug use: No   • Sexual activity: Not on file     Other Topics Concern   • Not on file     Social History Narrative         ALLERGIES  No known drug allergy        REVIEW OF SYSTEMS  Review of Systems   Unable to perform ROS: Mental status change   Constitutional: Positive for fatigue (per EMS).        URI for 1 month per EMS   Neurological: Positive for weakness (generalized weakness per EMS).   Psychiatric/Behavioral: Positive for confusion (per EMS).            PHYSICAL EXAM  ED Triage Vitals   Temp Heart Rate Resp BP SpO2   03/12/17 1243 03/12/17 1243 03/12/17 1243 03/12/17 1243 03/12/17 1331   98.4 °F (36.9 °C) 82 18 109/68 99 % WNL      Temp src Heart Rate Source Patient Position BP Location FiO2 (%)   -- 03/12/17 1243 03/12/17 1243 03/12/17 1359 --    Monitor Lying Right arm          Physical Exam   Constitutional: He appears distressed (acutely on chronically ill appearing. No respiratory distress. Appears tired and weak. ).   Elderly, frail, and chronically ill appearing   HENT:   Head: Normocephalic.   Mouth/Throat: Mucous membranes are dry.   Eyes: EOM are normal. Pupils are equal, round, and reactive to light.    Pale conjunctivae   Neck: Normal range of motion. Neck supple.   Cardiovascular: Normal rate, normal heart sounds and intact distal pulses.  An irregularly irregular rhythm present.   No murmur heard.  HR= 80s   Pulmonary/Chest: Effort normal and breath sounds normal. No respiratory distress. He has no decreased breath sounds. He has no wheezes. He has no rhonchi. He has no rales.   O2 sat is 98% on room air   Abdominal: Soft. There is no tenderness. There is no rebound and no guarding.   Genitourinary: Rectal exam shows guaiac positive stool (heme positive brown stool in rectal vault).   Musculoskeletal: Normal range of motion.   NV intact distally to all extremities   Neurological: He is alert. He has normal sensation. He is disoriented (disoriented to time). No cranial nerve deficit.   Generalized weakness, no focal weakness, forgetful and confuses easily, answers simple questions , follows commands, no focal neuro deficits   Skin: Skin is warm and dry. There is pallor.   Swelling and bruising to LUE (most prominent in left upper arm) and to left trunk (from left nipple to umbilicus)    Psychiatric: Mood and affect normal.   Nursing note and vitals reviewed.          LAB RESULTS  Recent Results (from the past 24 hour(s))   Comprehensive Metabolic Panel    Collection Time: 03/12/17  1:04 PM   Result Value Ref Range    Glucose 188 (H) 65 - 99 mg/dL    BUN 22 8 - 23 mg/dL    Creatinine 0.99 0.76 - 1.27 mg/dL    Sodium 138 136 - 145 mmol/L    Potassium 3.4 (L) 3.5 - 5.2 mmol/L    Chloride 99 98 - 107 mmol/L    CO2 20.8 (L) 22.0 - 29.0 mmol/L    Calcium 8.6 8.6 - 10.5 mg/dL    Total Protein 6.3 6.0 - 8.5 g/dL    Albumin 3.40 (L) 3.50 - 5.20 g/dL    ALT (SGPT) 31 1 - 41 U/L    AST (SGOT) 60 (H) 1 - 40 U/L    Alkaline Phosphatase 139 (H) 39 - 117 U/L    Total Bilirubin 1.8 (H) 0.1 - 1.2 mg/dL    eGFR Non African Amer 74 >60 mL/min/1.73    Globulin 2.9 gm/dL    A/G Ratio 1.2 g/dL    BUN/Creatinine Ratio 22.2 7.0 - 25.0     Anion Gap 18.2 mmol/L   Troponin    Collection Time: 03/12/17  1:04 PM   Result Value Ref Range    Troponin T 0.145 (C) 0.000 - 0.030 ng/mL   Magnesium    Collection Time: 03/12/17  1:04 PM   Result Value Ref Range    Magnesium 1.4 (C) 1.6 - 2.4 mg/dL   Protime-INR    Collection Time: 03/12/17  1:04 PM   Result Value Ref Range    Protime >100.0 (C) 11.7 - 14.2 Seconds    INR >10.00 (C) 0.90 - 1.10   Light Blue Top    Collection Time: 03/12/17  1:04 PM   Result Value Ref Range    Extra Tube hold for add-on    Green Top (Gel)    Collection Time: 03/12/17  1:04 PM   Result Value Ref Range    Extra Tube Hold for add-ons.    Lavender Top    Collection Time: 03/12/17  1:04 PM   Result Value Ref Range    Extra Tube hold for add-on    Gold Top - SST    Collection Time: 03/12/17  1:04 PM   Result Value Ref Range    Extra Tube Hold for add-ons.    CBC Auto Differential    Collection Time: 03/12/17  1:04 PM   Result Value Ref Range    WBC 12.02 (H) 4.50 - 10.70 10*3/mm3    RBC 2.47 (L) 4.60 - 6.00 10*6/mm3    Hemoglobin 7.8 (L) 13.7 - 17.6 g/dL    Hematocrit 23.1 (L) 40.4 - 52.2 %    MCV 93.5 79.8 - 96.2 fL    MCH 31.6 27.0 - 32.7 pg    MCHC 33.8 32.6 - 36.4 g/dL    RDW 13.9 11.5 - 14.5 %    RDW-SD 46.6 37.0 - 54.0 fl    MPV 8.9 6.0 - 12.0 fL    Platelets 173 140 - 500 10*3/mm3    Neutrophil % 81.1 (H) 42.7 - 76.0 %    Lymphocyte % 9.6 (L) 19.6 - 45.3 %    Monocyte % 8.8 5.0 - 12.0 %    Eosinophil % 0.1 (L) 0.3 - 6.2 %    Basophil % 0.2 0.0 - 1.5 %    Immature Grans % 0.2 0.0 - 0.5 %    Neutrophils, Absolute 9.75 (H) 1.90 - 8.10 10*3/mm3    Lymphocytes, Absolute 1.15 0.90 - 4.80 10*3/mm3    Monocytes, Absolute 1.06 0.20 - 1.20 10*3/mm3    Eosinophils, Absolute 0.01 0.00 - 0.70 10*3/mm3    Basophils, Absolute 0.02 0.00 - 0.20 10*3/mm3    Immature Grans, Absolute 0.03 0.00 - 0.03 10*3/mm3   Type & Screen    Collection Time: 03/12/17  1:35 PM   Result Value Ref Range    ABO Type A     RH type Positive     Antibody Screen  Negative    Urinalysis With / Culture If Indicated    Collection Time: 03/12/17  2:33 PM   Result Value Ref Range    Color, UA Dark Yellow (A) Yellow, Straw    Appearance, UA Clear Clear    pH, UA 6.0 5.0 - 8.0    Specific Gravity, UA 1.014 1.005 - 1.030    Glucose, UA Negative Negative    Ketones, UA 40 mg/dL (2+) (A) Negative    Bilirubin, UA Negative Negative    Blood, UA Large (3+) (A) Negative    Protein,  mg/dL (2+) (A) Negative    Leuk Esterase, UA Negative Negative    Nitrite, UA Negative Negative    Urobilinogen, UA 1.0 E.U./dL 0.2 - 1.0 E.U./dL   Urinalysis, Microscopic Only    Collection Time: 03/12/17  2:33 PM   Result Value Ref Range    RBC, UA Too Numerous to Count (A) None Seen, 0-2 /HPF    WBC, UA 0-2 None Seen, 0-2 /HPF    Bacteria, UA None Seen None Seen /HPF    Squamous Epithelial Cells, UA 0-2 None Seen, 0-2 /HPF    Hyaline Casts, UA 3-6 None Seen /LPF    Methodology Automated Microscopy    Protime-INR    Collection Time: 03/12/17  2:53 PM   Result Value Ref Range    Protime 15.5 (H) 11.7 - 14.2 Seconds    INR 1.27 (H) 0.90 - 1.10   POC Glucose Fingerstick    Collection Time: 03/12/17  4:11 PM   Result Value Ref Range    Glucose 182 (H) 70 - 130 mg/dL   Prepare RBC, 2 Units    Collection Time: 03/12/17  6:18 PM   Result Value Ref Range    Product Code D1246X06     Unit Number [id number]     UNIT  ABO O     UNIT  RH POS     CROSSMATCH 1 INTERPRETATION Compatible     Dispense Status IS     Blood Type OPOS     Blood Expiration Date 201703222359     Blood Type Barcode 5100     Product Code Q3049N35     Unit Number [id number]     UNIT  ABO O     UNIT  RH POS     CROSSMATCH 1 INTERPRETATION Compatible     Dispense Status IS     Blood Type OPOS     Blood Expiration Date 201703222359     Blood Type Barcode 5100        Ordered the above labs and reviewed the results.        RADIOLOGY         CT Head Without Contrast (Preliminary result)  (independently viewed by me, interpreted by  radiologist (Dr Vizcaino, discussed with radiologist (Dr Vizcaino))   Result time: 03/12/17 15:34:22     Preliminary result by Interface, Rad Results [place] (03/12/17 15:34:22)     Impression:     1. Acute bilateral cerebral convexity subdural hematomas more extensive  on the left. Subdural hematoma lateral to the left frontal lobe and  sylvian fissure measures 11 mm in thickness. There is also extension of  subdural hematoma along the left tentorium. Follow up evaluation  recommended.  2. Multifocal cerebral and left cerebellar areas of encephalomalacia  consistent with old infarctions.     Discussed Dr. You in the emergency room on 03/12/2017 at 3:30 PM.              Narrative:     CT HEAD WITHOUT CONTRAST     HISTORY: Confusion. Recent fall. Weakness.     TECHNIQUE: Head CT includes axial imaging from the base of the skull to  the vertex without IV contrast.     COMPARISON: Head CT without contrast 02/13/2017.     FINDINGS: There are acute bilateral cerebral convexity subdural  hematomas. On the right side, acute subdural hematomas extend anterior  to the right frontal lobe. Subdural hematomas are more extensive on the  left extending adjacent to the anterior lateral left frontal lobe, as  well as adjacent to the left temporal, occipital and parietal lobes.  Subdural hematoma anterior to the medial right frontal lobe measures 7  mm in thickness. Subdural hematoma lateral to the left frontal lobe and  sylvian fissure measures 11 mm in thickness. Subdural hematoma adjacent  to the posterolateral left occipital lobe where there is chronic  encephalomalacia measures 11 mm. There is also acute subdural hemorrhage  extending along the left tentorium measuring 4 mm. Acute subdural  hematomas are associated with localized mass effect particularly along  the anterolateral left frontal lobe. There is no shift in the midline  structures.     There are chronic areas of encephalomalacia similar to the previous  exam  consistent with old infarcts. Chronic areas of encephalomalacia are  present within the left lateral frontal lobe, left parietal lobe, left  occipital lobe, right frontal lobe, and left cerebellar hemisphere.  There is no evidence for intraventricular hemorrhage. Bone windows  demonstrate no calvarial fracture. There is a chronic defect in the left  anterior maxilla.                  CT Chest Without Contrast (Final result)  (independently viewed by me, interpreted by radiologist (Dr Lindo), discussed with radiologist (Dr Lindo))   Result time: 03/12/17 15:30:36     Final result by Rony Lindo MD (03/12/17 15:30:36)     Narrative:     EMERGENCY NONCONTRAST CT CHEST  EMERGENCY NONCONTRAST CT ABDOMEN AND PELVIS     HISTORY: 71-year-old male with confusion and weakness, recent trauma  with bruising over the torso. He complains of chest and abdominal pain.     COMPARISON: (none available)     FINDINGS:  1. There is prominent soft tissue stranding within the soft tissues the  left thorax with soft tissue mass left axilla likely hematoma measuring  83 x 66 mm on image #31.  2. Probable chronic pulmonary change with mild dependent atelectasis  without evidence of active parenchymal process.  3. Vascular calcification of the thoracic aorta without aneurysm,  previous CABG and median sternotomy relative elevation left  hemidiaphragm.  4. Question old rib fractures but no evidence of acute osseous process.  5. Soft tissue mass posterior to the left SI joint extending laterally  measuring up to 11 x 2 cm consistent with hematoma.  6. No obstruction, free air nor dilatation of bowel. The appendix is  normal I see no free fluid. The gallbladder surgically absent. The  liver, bili system, liver, spleen, adrenal glands are otherwise normal.  7. Minimal enlargement of the infrarenal aorta with maximum dimension  less than 28 mm.  8. Right inferior iliopsoas muscle enlargement likely due to hemorrhage  measuring 63 x  54 mm centered about image #98.  9. The left kidney and right kidney both contain nonobstructing  calcifications, and the pancreas demonstrates minimal calcifications  suggesting chronic pancreatitis.     The above has been reviewed with Dr. You of the ER by Dr. Lindo     This report was finalized on 3/12/2017 3:30 PM by Dr. Rony Lindo MD.                  CT Abdomen Pelvis Without Contrast (Final result)  (independently viewed by me, interpreted by radiologist (Dr Lindo), discussed with radiologist (Dr Lindo))   Result time: 03/12/17 15:30:36     Final result by Rony Lindo MD (03/12/17 15:30:36)     Narrative:     EMERGENCY NONCONTRAST CT CHEST  EMERGENCY NONCONTRAST CT ABDOMEN AND PELVIS     HISTORY: 71-year-old male with confusion and weakness, recent trauma  with bruising over the torso. He complains of chest and abdominal pain.     COMPARISON: (none available)     FINDINGS:  1. There is prominent soft tissue stranding within the soft tissues the  left thorax with soft tissue mass left axilla likely hematoma measuring  83 x 66 mm on image #31.  2. Probable chronic pulmonary change with mild dependent atelectasis  without evidence of active parenchymal process.  3. Vascular calcification of the thoracic aorta without aneurysm,  previous CABG and median sternotomy relative elevation left  hemidiaphragm.  4. Question old rib fractures but no evidence of acute osseous process.  5. Soft tissue mass posterior to the left SI joint extending laterally  measuring up to 11 x 2 cm consistent with hematoma.  6. No obstruction, free air nor dilatation of bowel. The appendix is  normal I see no free fluid. The gallbladder surgically absent. The  liver, bili system, liver, spleen, adrenal glands are otherwise normal.  7. Minimal enlargement of the infrarenal aorta with maximum dimension  less than 28 mm.  8. Right inferior iliopsoas muscle enlargement likely due to hemorrhage  measuring 63 x 54 mm centered  about image #98.  9. The left kidney and right kidney both contain nonobstructing  calcifications, and the pancreas demonstrates minimal calcifications  suggesting chronic pancreatitis.     The above has been reviewed with Dr. You of the ER by Dr. Lindo     This report was finalized on 3/12/2017 3:30 PM by Dr. Rony Lindo MD.                  XR Chest 1 View (Final result) Result time: 03/12/17 13:15:35     Final result by Rony Lindo MD (03/12/17 13:15:35)     Narrative:     EMERGENCY PORTABLE CHEST SINGLE VIEW 12:56     HISTORY: 71-year-old male presenting with weakness and dizziness.  HISTORY includes coronary artery disease previous CABG, diabetes,  hypertension and atrial fibrillation.     COMPARISON: 10/20/2015     FINDINGS:  1. Limited imaging due to body habitus portable technique and low lung  volumes.  2. Multiple old left rib fractures stable.  3. Cardiac enlargement, median sternotomy, CABG markers and vascular  calcification.  4. The right lung appears grossly clear.  5. Ill-defined opacification left base suggesting effusion and  atelectasis can't rule out developing airspace process.     Follow-up PA and lateral imaging would be helpful when condition permits  for further evaluation of left base opacification.     This report was finalized on 3/12/2017 1:15 PM by Dr. Rony Lindo MD.               XR Humerus Left (Preliminary result) Result time: 03/12/17 17:14:30     Preliminary result by Spotsi, [employer] Results [place] In (03/12/17 17:14:30)     Narrative:     LEFT HUMERUS     HISTORY: Altered mental status. Fatigue. Fall with bruising.     LEFT HUMERUS: 2 VIEWS     FINDINGS: There is no fracture or osseous abnormality of the left  humerus, and the soft tissues appear normal. There are mild arthritic  changes of the left acromioclavicular joint. There are also old healed  left lateral rib fractures.              Ordered the above noted radiological studies. Reviewed by me in PACS.        PROCEDURES  Critical Care  Performed by: NAZANIN JONES  Authorized by: NAZANIN JONES   Total critical care time: 50 minutes  Critical care time was exclusive of separately billable procedures and treating other patients.  Critical care was necessary to treat or prevent imminent or life-threatening deterioration of the following conditions: cardiac failure, metabolic crisis, trauma, CNS failure or compromise and circulatory failure.  Critical care was time spent personally by me on the following activities: development of treatment plan with patient or surrogate, discussions with consultants, discussions with primary provider, evaluation of patient's response to treatment, examination of patient, obtaining history from patient or surrogate, ordering and performing treatments and interventions, ordering and review of laboratory studies, ordering and review of radiographic studies, pulse oximetry, re-evaluation of patient's condition and review of old charts.          EKG           EKG time: 1246  Rhythm/Rate: atrial fibrillation, rate= 88  QRS, axis: narrow QRS   ST and T waves: nonspecific ST-T changes     Interpreted Contemporaneously by me, independently viewed  unchanged compared to prior 2/24/17      PROGRESS AND CONSULTS  ED Course     12:44 PM- Ordered blood work, INR, UA, magnesium level, troponin, CXR, and EKG for further evaluation.     1:33 PM- Ordered CT abd/pel, CT chest, CT head, and left humerus xray for further evaluation. Stool is heme positive on rectal exam. Ordered protonix to cover for GI bleeding.    1:35 PM- Hemoglobin is 7.8. Ordered type and screen and 2 units PRBCs.    1:40 PM- Potassium level is 3.4. Magnesium level is 1.4. Ordered KCl and magnesium sulfate.     1:58 PM- Sent call out to intensivist on call. Admission decision time= 1358    2:13 PM- Discussed case with Dr DARA Jones (intensivist)  Reviewed history, exam, results and treatments.  Discussed concerns and plan of care.   Karen accepts pt to be admitted to ICU and recommends administering KCentra and Vitamin K for coumadin reversal (have been ordered).    2:18 PM- Family now at bedside. Rechecked pt. BP is 117/79. O2 sat is 98% on room air. HR is in 80s. He is resting comfortably and is in no acute distress. Discussed with pt and family about all pertinent results including hemoglobin of 7.8, INR >10, magnesium level of 1.4, potassium level of 3.4, and elevated troponin. Informed them that pt's stool is heme positive. Notified them about plan to transfuse blood, to reverse coumadin, and to obtain imaging of abdomen, chest, and head to rule out internal bleeding. Discussed pt admission for further care and observation in the ICU. Pt and family verbalize understanding and agree with plan. Pt is ready for admission.    3:32 PM- CT head shows subdural hematoma. Sent call out to neurosurgeon on call for consult.    3:34 PM- Discussed case with Dr Granados (neurosurgeon)  Reviewed history, exam, results and treatments.  Discussed concerns and plan of care. Dr Granados will consult.    3:37 PM- Rechecked pt. His condition remains unchanged. He follows commands and moves all extremities. He remains confused. Nonfocal neuro exam. Discussed with family about CT head results (subdural hematoma) and CT chest/abd/pel results (no intraperitoneal blood; no hemothorax; no pneumothorax; hematoma in left axilla, left chest wall, left buttock, and iliopsoas on the right). Informed family about pt's critical condition and explained treatment plan again. Pt has already received KCentra and is currently receiving IV KCl and blood transfusion. I have explained to the patient and the family the results of our tests and initial treatment plan.  The patient is critically ill and will need to be admitted to the ICU or CCU.  I explained that the patient has a significant potential for serious morbidity and potential mortality.  They appear to understand my concerns  and I have answered all of their questions.      4:09 PM- Updated Dr Jones regarding CT head and CT chest/abd/pel results.      MEDICAL DECISION MAKING  Results were reviewed/discussed with the patient and family.     MDM  Number of Diagnoses or Management Options  Acute posthemorrhagic anemia:   Bruising and hematoma to left chest and left abdomen:   Chronic atrial fibrillation:   Contusion of left upper arm, initial encounter:   Elevated troponin:   Hypomagnesemia:   Occult GI bleeding:   Subdural hematoma:   Toxic metabolic encephalopathy:   Warfarin-induced coagulopathy (severe):      Amount and/or Complexity of Data Reviewed  Clinical lab tests: ordered and reviewed (INR >10, magnesium level= 1.4, WBC= 12.02, hgb= 7.8, troponin= 0.145, potassium= 3.4)  Tests in the radiology section of CPT®: reviewed and ordered (CT head-  subdural hematoma, no midline shift    CT chest/abd/pel- no intraperitoneal blood; no hemothorax; no pneumothorax; hematoma in left axilla, left chest wall, left buttock, and iliopsoas on the right    Left humerus xray- no acute process    CXR- limited imaging due to body habitus portable technique and low lung volumes, multiple old left rib fractures stable, cardiac enlargement, median sternotomy, CABG markers and vascular calcification, the right lung appears grossly clear, ill-defined opacification left base suggesting effusion and atelectasis (cannot rule out developing airspace process))  Tests in the medicine section of CPT®: reviewed and ordered (EKG)  Discussion of test results with the performing providers: yes (Dr Lindo (radiologist) regarding CT chest/abd/pel    Dr Vizcaino (radiologist) regarding CT head)  Decide to obtain previous medical records or to obtain history from someone other than the patient: yes  Review and summarize past medical records: yes (Pt was discharged on 2/16/17 from [facility] for syncope and UTI. At that time, ECHO showed EF of 57%, no stenosis, and was fairly  unremarkable. Carotid doppler was normal.     3 weeks ago, hgb was 13.3, INR was 2.96, and troponin was 0.028. )  Discuss the patient with other providers: yes (Dr DARA Jones (intensivist) will admit. Dr Granados (neurosurgeon) will consult. )  Independent visualization of images, tracings, or specimens: yes    Patient Progress  Patient progress: other (comment) (Condition= critical)             DIAGNOSIS  Final diagnoses:   Acute posthemorrhagic anemia   Warfarin-induced coagulopathy (severe)   Occult GI bleeding   Elevated troponin   Bruising and hematoma to left chest and left abdomen   Chronic atrial fibrillation   Toxic metabolic encephalopathy   Hypomagnesemia   Contusion of left upper arm, initial encounter   Subdural hematoma         DISPOSITION  Admitted- ICU    Discussed treatment plan and reason for admission with pt/family and admitting physician.  Pt/family voiced understanding of the plan for admission for further testing/treatment as needed.       Latest Documented Vital Signs:  As of 3:42 PM  BP- 124/64 HR- 88 Temp- 99.2 °F (37.3 °C) O2 sat- 99%        --  Documentation assistance provided by rosa Lisa for Dr You.  Information recorded by the scribe was done at my direction and has been verified and validated by me.       Marin Lisa  03/12/17 2108       Isacc You MD  03/12/17 2118

## 2017-03-12 NOTE — ED NOTES
Patient brought via EMS from home for AMS and fatigue for the last 2 days. States that he has had a cold for about 1 month. Patient is A/O to self, time, and place but not situation. Patient has large bruising on his left torso and arm.      Modesta Gottlieb RN  03/12/17 6720

## 2017-03-12 NOTE — H&P
[facility]    CC: weakness    HPI:  Mr. Hernandez is a 70yo WM with a history of Afib and history of recent admission just about a month ago after sustaining a fall.  He comes to the ER via EMS from home for confusion, weakness, and fatigue.  He sates he has felt like he has a cold for about a month and he fell at somepoint.  History is not terribly clear because he is confused.    He does not report specific alleviating or exacerbating factors.      Past Medical History   Diagnosis Date   • Atrial fibrillation    • B12 deficiency    • Carotid artery disease    • Coronary artery disease    • Depression    • Diabetes mellitus    • History of thrombocytopenia    • Hyperlipidemia    • Hypertension    • Old myocardial infarct 03/2015   • Stroke    • Vision loss, left eye      Family History   Problem Relation Age of Onset   • Cancer Other    • Coronary artery disease Other    • Diabetes Other    • Hypertension Other    • Cancer Mother    • Diabetes Father    • Heart disease Father    • Stroke Father    • Diabetes Daughter    • Diabetes Son    • Heart disease Son      Social History     Social History   • Marital status:      Spouse name: Krystal   • Number of children: N/A   • Years of education: N/A     Occupational History   •  Retired     Social History Main Topics   • Smoking status: Former Smoker     Quit date: 1995   • Smokeless tobacco: Former User      Comment: Quit 25 yrs ago   • Alcohol use No      Comment: quit in 1995   • Drug use: No   • Sexual activity: Not Asked     Other Topics Concern   • None     Social History Narrative     MEDS: reviewed  ALL: NKDA  ROS: mental status prohibits    Vital Sign Min/Max for last 24 hours  Temp  Min: 98.4 °F (36.9 °C)  Max: 99.2 °F (37.3 °C)   BP  Min: 109/68  Max: 124/64   Pulse  Min: 78  Max: 88   Resp  Min: 18  Max: 18   SpO2  Min: 98 %  Max: 99 %   No Data Recorded   Weight  Min: 200 lb (90.7 kg)  Max: 200 lb (90.7 kg)     GEN:  NAD,  AxOx1  HEENT:  PERRL, EOMI, no icterus, mmm, no jvd, trachea midline, neck supple  CHEST: CTA bilat, no wheezes, no crackles, no use of accessory muscles  CV: RRR, no m/g/r  ABD: soft, nt, nd +bs, no hepatosplenomegaly  EXT: no c/c/e  SKIN: no rashes, no xanthomas, nl turgor hematoma prohibits.  LYMPH: no palpable cervical or supraclavicular lymphadenopathy  NEURO: CN 2-12 intact and symmetric bilaterally  PSYCH: nl affect, nl orientation, nl judgement, nl mood  MSK: No kyphoscoliosis, 5/5 strength ue and le bilat    Labs:  inr >10  Wbc 12  hgb 7.8  plts 173  Mg 1.4  Trop 0.145  Bicarb 20.8  Glucose 188    Ct: acute subdural; axillary hematoma (8.3cmX6.6cm); left si joint hematoma (11X2cm); iliopsoas muscle hematoma(6.3X 5.4)    A/P:  1. Acute subdural hematoma -- correct coagulopathy, q1 hour neuro checks; keli to follow. Systolic <160  2. Multiple hematomas -- axillary, si joing, iliopsoas -- correct coagulopathy  3. Acute blood loss anemia -- monitor transfuse as needed, I think his hematomas explain it; he did have guiac positive stool in er but I don't think this is the major source of blood loss  4. Elevated troponin -- will have cardiology to see  5. coagulopathy -- given kcentra and vit k in er per protocol, most recent check, inr is ok.  6. Dm/hyperglycemia -- ssi as per icu protocol  7. afib -- rate controlled, I would say he is no longer an anticoagulation candidate given frequent falls  8. Confusion -- suspect encephalopathy 2/2 #1  9. bp control -- keep systolic less than 160    D/w rn and with son at bedside  CC 35

## 2017-03-12 NOTE — CONSULTS
Patient Name: Aime Hernandez  Age/Sex: 71 y.o. male  : 1945  MRN: [id number]    Date of Admission: 3/12/2017  Date of Encounter Visit: 17  Encounter Provider: Yariel Martinez MD  Place of Service: [facility] CARDIOLOGY      Referring Provider: Evgeny Jones MD  Patient Care Team:  JANIE Vilchis as PCP - General (Family Medicine)  Basilio Briggs MD as Consulting Physician (Hematology and Oncology)  Sea Sherwood MD as Consulting Physician (Cardiology)    Subjective:       Admitted/Consulted for: Elevated troponin   Chief Complaint: Confusion        [id number]:  6     History of Present Illness:  Aime Hernandez is a 71 y.o. male Patient who normally sees .   The patient has a history of coronary artery disease and is status post previous bypass surgery.  He also has chronic atrial fibrillation and has been on chronic warfarin therapy.  In  the patient had a cerebral vascular accident and subsequently underwent a left carotid stent placement.    The patient was hospitalized in February of this year with a syncopal episode.  When he was evaluated on  he appeared to be at baseline without any complaints of headache and lightheadedness dizziness or shortness of breath or any chest discomfort.  A CT scan of the head was negative for an acute bleed.  His INR was 2.3 at the time of his admission.  His troponin was slightly elevated at 0.32.  In 2016 the patient did have a perfusion study which did not show any evidence of myocardial ischemia.  His left ventricular function is normal.    The patient presented back to the hospital today with what appears to be some confusion according to his family.  He just hadn't felt well for the last few days.  The patient has been diagnosed with a subdural hematoma.  His INR was greater than 10 upon admission.      the patient also has a slightly elevated troponin but it is  actually less then when he was here previously.  Electrocardiogram shows ST-T wave abnormalities anteriorly that are similar to what he's had in the past.           Past Medical History:  Past Medical History   Diagnosis Date   • Atrial fibrillation    • B12 deficiency    • Carotid artery disease    • Coronary artery disease    • Depression    • Diabetes mellitus    • History of thrombocytopenia    • Hyperlipidemia    • Hypertension    • Old myocardial infarct 03/2015   • Stroke    • Vision loss, left eye        Past Surgical History   Procedure Laterality Date   • Coronary artery bypass graft        x 3   • Back surgery     • Carotid stent  2015   • Tonsillectomy and adenoidectomy     • Inguinal hernia repair         Home Medications:   Prescriptions Prior to Admission   Medication Sig Dispense Refill Last Dose   • acetaminophen (TYLENOL) 325 MG tablet Take 650 mg by mouth Every 6 (Six) Hours As Needed for mild pain (1-3).   Unknown at Unknown time   • atorvastatin (LIPITOR) 20 MG tablet Take 20 mg by mouth daily.   Unknown at Unknown time   • B-D ULTRAFINE III SHORT PEN 31G X 8 MM misc    Unknown at Unknown time   • Bioflavonoid Products (VITAMIN C PLUS PO) Take  by mouth Daily.   Taking   • insulin glargine (LANTUS) 100 UNIT/ML injection Inject 20 Units under the skin daily.   Unknown at Unknown time   • metFORMIN (GLUCOPHAGE) 1000 MG tablet 1,000 mg Daily With Breakfast.   Unknown at Unknown time   • metoprolol tartrate (LOPRESSOR) 50 MG tablet Take 50 mg by mouth 2 (Two) Times a Day.   Unknown at Unknown time   • nitroglycerin (NITROSTAT) 0.4 MG SL tablet Place 1 tablet under the tongue every 5 (five) minutes as needed for chest pain. 25 tablet 3 Unknown at Unknown time   • sertraline (ZOLOFT) 50 MG tablet 50 mg Every Night.   Unknown at Unknown time   • VITAMIN E PO Take  by mouth Daily.   Unknown at Unknown time   • warfarin (COUMADIN) 5 MG tablet Take 5 mg by mouth 3 (Three) Times a Week. Monday, Wednesday,  Friday   Unknown at Unknown time   • warfarin (COUMADIN) 7.5 MG tablet Take 7.5 mg by mouth 4 (Four) Times a Week. Sunday, Tuesday, Thursday, Saturday   Unknown at Unknown time       Allergies:  Allergies   Allergen Reactions   • No Known Drug Allergy        Past Social History:  Social History     Social History   • Marital status:      Spouse name: Krystal   • Number of children: N/A   • Years of education: N/A     Occupational History   •  Retired     Social History Main Topics   • Smoking status: Former Smoker     Quit date: 1995   • Smokeless tobacco: Former User      Comment: Quit 25 yrs ago   • Alcohol use No      Comment: quit in 1995   • Drug use: No   • Sexual activity: Not on file     Other Topics Concern   • Not on file     Social History Narrative        Past Family History:  Family History   Problem Relation Age of Onset   • Cancer Other    • Coronary artery disease Other    • Diabetes Other    • Hypertension Other    • Cancer Mother    • Diabetes Father    • Heart disease Father    • Stroke Father    • Diabetes Daughter    • Diabetes Son    • Heart disease Son        Review of Systems:   patient is somewhat confused and is unable to give an adequate review of systems.  All the questions that I asked him concerning his cardiopulmonary status were answered in the negative.        Objective:     Objective:  Temp:  [98.4 °F (36.9 °C)-99.2 °F (37.3 °C)] 98.8 °F (37.1 °C)  Heart Rate:  [72-93] 82  Resp:  [18] 18  BP: (109-146)/(60-81) 139/81    Intake/Output Summary (Last 24 hours) at 03/12/17 1842  Last data filed at 03/12/17 1803   Gross per 24 hour   Intake 776.83 ml   Output      0 ml   Net 776.83 ml     Body mass index is 28.7 kg/(m^2).  Last 3 weights    03/12/17  1243   Weight: 200 lb (90.7 kg)           Physical Exam:   General Appearance Well developed, cooperative and well nourished and no acute distress he is confused.     Head Normocephalic, without abnormality, atraumatic   Ears Ears  appear intact with no abnormalities noted   Throat No oral lesions, no thrush, oral mucosa moist   Neck No adenopathy, supple, trachea midline, no thyromegaly, no carotid bruit, no JVD   lungs   clear    heart   irregular rhythm, no S3 nor S4 no murmurs noted.         Chest wall No abnormalities observed   Abdomen Normal bowel sounds, no masses, no hepatomegaly,    Extremities Moves all extremities well, no edema, no cyanosis, no redness   Pulses Pulses palpable and equal bilaterally. Normal radial, carotid, femoral, dorsalis pedis and posterior tibial pulses bilaterally. Normal abdominal aorta   Skin  bruising is noted    Psyhiatric Alert and oriented x 3, normal mood and affect        Lab Review:       Results from last 7 days  Lab Units 03/12/17  1304   SODIUM mmol/L 138   POTASSIUM mmol/L 3.4*   CHLORIDE mmol/L 99   TOTAL CO2 mmol/L 20.8*   BUN mg/dL 22   CREATININE mg/dL 0.99   GLUCOSE mg/dL 188*   CALCIUM mg/dL 8.6   AST (SGOT) U/L 60*   ALT (SGPT) U/L 31       Results from last 7 days  Lab Units 03/12/17  1304   TROPONIN T ng/mL 0.145*       Results from last 7 days  Lab Units 03/12/17  1304   WBC 10*3/mm3 12.02*   HEMOGLOBIN g/dL 7.8*   HEMATOCRIT % 23.1*   PLATELETS 10*3/mm3 173       Results from last 7 days  Lab Units 03/12/17  1453 03/12/17  1304   INR  1.27* >10.00*           Results from last 7 days  Lab Units 03/12/17  1304   MAGNESIUM mg/dL 1.4*                       Imaging:    Imaging Results (most recent)     Procedure Component Value Units Date/Time    XR Chest 1 View [[id number]] Collected:  03/12/17 1314     Updated:  03/12/17 1318    Narrative:       EMERGENCY PORTABLE CHEST SINGLE VIEW 12:56     HISTORY: 71-year-old male presenting with weakness and dizziness.  HISTORY includes coronary artery disease previous CABG, diabetes,  hypertension and atrial fibrillation.     COMPARISON: 10/20/2015     FINDINGS:  1. Limited imaging due to body habitus portable technique and low lung  volumes.  2.  Multiple old left rib fractures stable.  3. Cardiac enlargement, median sternotomy, CABG markers and vascular  calcification.  4. The right lung appears grossly clear.  5. Ill-defined opacification left base suggesting effusion and  atelectasis can't rule out developing airspace process.     Follow-up PA and lateral imaging would be helpful when condition permits  for further evaluation of left base opacification.     This report was finalized on 3/12/2017 1:15 PM by Dr. Rony Lindo MD.       CT Chest Without Contrast [[id number]] Collected:  03/12/17 1518     Updated:  03/12/17 1533    Narrative:       EMERGENCY NONCONTRAST CT CHEST  EMERGENCY NONCONTRAST CT ABDOMEN AND PELVIS     HISTORY: 71-year-old male with confusion and weakness, recent trauma  with bruising over the torso. He complains of chest and abdominal pain.     COMPARISON: (none available)     FINDINGS:  1. There is prominent soft tissue stranding within the soft tissues the  left thorax with soft tissue mass left axilla likely hematoma measuring  83 x 66 mm on image #31.  2. Probable chronic pulmonary change with mild dependent atelectasis  without evidence of active parenchymal process.  3. Vascular calcification of the thoracic aorta without aneurysm,  previous CABG and median sternotomy relative elevation left  hemidiaphragm.  4. Question old rib fractures but no evidence of acute osseous process.  5. Soft tissue mass posterior to the left SI joint extending laterally  measuring up to 11 x 2 cm consistent with hematoma.  6. No obstruction, free air nor dilatation of bowel. The appendix is  normal I see no free fluid. The gallbladder surgically absent. The  liver, bili system, liver, spleen, adrenal glands are otherwise normal.  7. Minimal enlargement of the infrarenal aorta with maximum dimension  less than 28 mm.  8. Right inferior iliopsoas muscle enlargement likely due to hemorrhage  measuring 63 x 54 mm centered about image #98.  9. The left  kidney and right kidney both contain nonobstructing  calcifications, and the pancreas demonstrates minimal calcifications  suggesting chronic pancreatitis.     The above has been reviewed with Dr. You of the ER by Dr. Lindo     This report was finalized on 3/12/2017 3:30 PM by Dr. Rony Lindo MD.       CT Abdomen Pelvis Without Contrast [[id number]] Collected:  03/12/17 1518     Updated:  03/12/17 1533    Narrative:       EMERGENCY NONCONTRAST CT CHEST  EMERGENCY NONCONTRAST CT ABDOMEN AND PELVIS     HISTORY: 71-year-old male with confusion and weakness, recent trauma  with bruising over the torso. He complains of chest and abdominal pain.     COMPARISON: (none available)     FINDINGS:  1. There is prominent soft tissue stranding within the soft tissues the  left thorax with soft tissue mass left axilla likely hematoma measuring  83 x 66 mm on image #31.  2. Probable chronic pulmonary change with mild dependent atelectasis  without evidence of active parenchymal process.  3. Vascular calcification of the thoracic aorta without aneurysm,  previous CABG and median sternotomy relative elevation left  hemidiaphragm.  4. Question old rib fractures but no evidence of acute osseous process.  5. Soft tissue mass posterior to the left SI joint extending laterally  measuring up to 11 x 2 cm consistent with hematoma.  6. No obstruction, free air nor dilatation of bowel. The appendix is  normal I see no free fluid. The gallbladder surgically absent. The  liver, bili system, liver, spleen, adrenal glands are otherwise normal.  7. Minimal enlargement of the infrarenal aorta with maximum dimension  less than 28 mm.  8. Right inferior iliopsoas muscle enlargement likely due to hemorrhage  measuring 63 x 54 mm centered about image #98.  9. The left kidney and right kidney both contain nonobstructing  calcifications, and the pancreas demonstrates minimal calcifications  suggesting chronic pancreatitis.     The above has been  reviewed with Dr. You of the ER by Dr. Lindo     This report was finalized on 3/12/2017 3:30 PM by Dr. Rony Lindo MD.       CT Head Without Contrast [[id number]] Collected:  03/12/17 1534     Updated:  03/12/17 1556    Narrative:       CT HEAD WITHOUT CONTRAST     HISTORY: Confusion. Recent fall. Weakness.     TECHNIQUE: Head CT includes axial imaging from the base of the skull to  the vertex without IV contrast.     COMPARISON: Head CT without contrast 02/13/2017.     FINDINGS: There are acute bilateral cerebral convexity subdural  hematomas. On the right side, acute subdural hematomas extend anterior  to the right frontal lobe. Subdural hematomas are more extensive on the  left extending adjacent to the anterior lateral left frontal lobe, as  well as adjacent to the left temporal, occipital and parietal lobes.  Subdural hematoma anterior to the medial right frontal lobe measures 7  mm in thickness. Subdural hematoma lateral to the left frontal lobe and  sylvian fissure measures 11 mm in thickness. Subdural hematoma adjacent  to the posterolateral left occipital lobe where there is chronic  encephalomalacia measures 11 mm. There is also acute subdural hemorrhage  extending along the left tentorium measuring 4 mm. Acute subdural  hematomas are associated with localized mass effect particularly along  the anterolateral left frontal lobe. There is no shift in the midline  structures.     There are chronic areas of encephalomalacia similar to the previous exam  consistent with old infarcts. Chronic areas of encephalomalacia are  present within the left lateral frontal lobe, left parietal lobe, left  occipital lobe, right frontal lobe, and left cerebellar hemisphere.  There is no evidence for intraventricular hemorrhage. Bone windows  demonstrate no calvarial fracture. There is a chronic defect in the left  anterior maxilla.       Impression:       1. Acute bilateral cerebral convexity subdural hematomas more  extensive  on the left. Subdural hematoma lateral to the left frontal lobe and  sylvian fissure measures 11 mm in thickness. There is also subdural  hematoma along the left tentorium. Follow up evaluation recommended.  2. Multifocal cerebral and left cerebellar areas of encephalomalacia  consistent with old infarctions.     Discussed Dr. You in the emergency room on 03/12/2017 at 3:30 PM.     This report was finalized on 3/12/2017 3:53 PM by Dr. Aaron Gaspar MD.       XR Humerus Left [[id number]] Collected:  03/12/17 1714     Updated:  03/12/17 1714    Narrative:       LEFT HUMERUS     HISTORY: Altered mental status. Fatigue. Fall with bruising.     LEFT HUMERUS: 2 VIEWS     FINDINGS: There is no fracture or osseous abnormality of the left  humerus, and the soft tissues appear normal. There are mild arthritic  changes of the left acromioclavicular joint. There are also old healed  left lateral rib fractures.             EKG:  reviewed         Baseline:     I personally viewed and interpreted the patient's EKG/Telemetry data.    Assessment:   1.  Subdural hematoma, recent fall  2.  Warfarin toxicity  3.  Elevated troponin: Doubt acute myocardial infarction, may be stress induced  4.  Coronary artery disease: Status post CABG electrocardiogram similar to previous  5.  Chronic atrial fibrillation: On warfarin therapy.  Stopped at present  6.  Anemia: Presently being transfused    7.  Diabetes mellitus     continue supportive care at this point.  His heart rate appears to be controlled.  We will recheck his electrocardiogram as well as a troponin in the morning     Thank lanre yoder for allowing me to participate in the care of Aime Hernandez. Feel free to contact me directly with any further questions or concerns.    Yariel Martinez MD  [facility]  03/12/17  6:42 PM

## 2017-03-13 NOTE — PROGRESS NOTES
[facility]     Mar/chart reviewed  F/u ICH and acute blood loss anemia. Mild headache, no weakness or visual changes.     Vital Sign Min/Max for last 24 hours  Temp  Min: 97.8 °F (36.6 °C)  Max: 99.2 °F (37.3 °C)   BP  Min: 109/68  Max: 147/78   Pulse  Min: 72  Max: 103   Resp  Min: 18  Max: 18   SpO2  Min: 96 %  Max: 100 %   No Data Recorded   Weight  Min: 200 lb (90.7 kg)  Max: 200 lb (90.7 kg)     Nad, axox2,   perrl, eomi, no icterus,  mmm, no jvd, trachea midline, neck supple,  chest cta bilaterally, no crackles, no wheezes,   rrr,   soft, nt, nd +bs,  no c/c/ e    Labs:  Cr 0.65  Na 139  k 3  Bicarb 19.6  Trop 0.112  Wbc 9.1  hgb 8.3  plts 128    A/P:  1. Acute subdural hematoma -- corrected coagulopathy, Systolic <160  2. Multiple hematomas -- axillary, si joing, iliopsoas -- corrected coagulopathy  3. Acute blood loss anemia -- monitor transfuse as needed, I think his hematomas explain it; he did have guiac positive stool in er but I don't think this is the major source of blood loss  4. Elevated troponin -- will have cardiology to see  5. coagulopathy -- given kcentra and vit k in er per protocol, most recent check, inr is ok.  6. Dm/hyperglycemia -- ssi as per icu protocol  7. afib -- rate controlled, continue to hold anticoagulation  8. Confusion -- suspect encephalopathy 2/2 #1  9. bp control -- keep systolic less than 160    Can move out of ICU when okay with FOZIA  Will need pt/ot eval

## 2017-03-13 NOTE — PROGRESS NOTES
Acute Care - Physical Therapy Initial Evaluation  [facility]     Patient Name: Aime Hernandez  : 1945  MRN: [id number]  Today's Date: 3/13/2017   Onset of Illness/Injury or Date of Surgery Date: 17            Admit Date: 3/12/2017     Visit Dx:    ICD-10-CM ICD-9-CM   1. Acute posthemorrhagic anemia D62 285.1   2. Warfarin-induced coagulopathy (severe) T45.511A 286.9    D68.9 E934.2   3. Occult GI bleeding R19.5 792.1   4. Elevated troponin R79.89 790.6   5. Bruising and hematoma to left chest and left abdomen T14.8 924.9   6. Chronic atrial fibrillation I48.2 427.31   7. Toxic metabolic encephalopathy G92 349.82   8. Hypomagnesemia E83.42 275.2   9. Contusion of left upper arm, initial encounter S40.022A 923.03   10. Subdural hematoma I62.00 432.1     Patient Active Problem List   Diagnosis   • B12 deficiency   • Thrombocytopenia   • Carotid artery stenosis   • CAD (coronary artery disease)   • Chronic a-fib   • DM type 2 (diabetes mellitus, type 2)   • Acute posthemorrhagic anemia     Past Medical History   Diagnosis Date   • Atrial fibrillation    • B12 deficiency    • Carotid artery disease    • Coronary artery disease    • Depression    • Diabetes mellitus    • History of thrombocytopenia    • Hyperlipidemia    • Hypertension    • Old myocardial infarct 2015   • Stroke    • Vision loss, left eye      Past Surgical History   Procedure Laterality Date   • Coronary artery bypass graft        x 3   • Back surgery     • Carotid stent     • Tonsillectomy and adenoidectomy     • Inguinal hernia repair            PT ASSESSMENT (last 72 hours)      PT Evaluation       17 1446 17 0800    Rehab Evaluation    Document Type evaluation  -AR evaluation  -CP    Subjective Information agree to therapy  -AR no complaints  -CP    Patient Effort, Rehab Treatment good  -AR     Symptoms Noted During/After Treatment fatigue  -AR     General Information    Patient Profile Review yes  -AR     Onset  of Illness/Injury or Date of Surgery Date 03/12/17  -AR     Precautions/Limitations fall precautions  -AR     Prior Level of Function min assist:  -AR     Equipment Currently Used at Home walker, rolling  -AR     Barriers to Rehab none identified  -AR     Living Environment    Living Arrangements house  -AR     Home Accessibility stairs to enter home  -AR     Number of Stairs to Enter Home 1  -AR     Clinical Impression    Patient/Family Goals Statement DC home  -AR     Criteria for Skilled Therapeutic Interventions Met yes  -AR     Pathology/Pathophysiology Noted (Describe Specifically for Each System) musculoskeletal  -AR     Impairments Found (describe specific impairments) aerobic capacity/endurance;gait, locomotion, and balance  -AR     Rehab Potential good, to achieve stated therapy goals  -AR     Vital Signs    O2 Delivery Pre Treatment room air  -AR     Pain Assessment    Pain Assessment No/denies pain  -AR     Cognitive Assessment/Intervention    Current Cognitive/Communication Assessment impaired  -AR impaired  -CP    Orientation Status oriented to;person  -AR oriented to;person;place;other (see comments)   slow to respond; did not know birth year  -CP    Follows Commands/Answers Questions 75% of the time;able to follow single-step instructions;needs cueing;needs increased time  -AR 75% of the time  -CP    Personal Safety impulsive;mild impairment  -AR     ROM (Range of Motion)    General ROM no range of motion deficits identified  -AR     MMT (Manual Muscle Testing)    General MMT Assessment --   B LE -4/5  -AR     Bed Mobility, Assessment/Treatment    Bed Mobility, Assistive Device bed rails;head of bed elevated  -AR     Bed Mob, Supine to Sit, [place] minimum assist (75% patient effort)  -AR     Bed Mob, Sit to Supine, [place] minimum assist (75% patient effort)  -AR     Transfer Assessment/Treatment    Transfers, Sit-Stand [place] minimum assist (75% patient effort)  -AR     Transfers,  Stand-Sit [place] minimum assist (75% patient effort)  -AR     Transfers, Sit-Stand-Sit, Assist Device rolling walker  -AR     Transfer, Comment cues for UE placement/safety  -AR     Gait Assessment/Treatment    Gait, [place] Level minimum assist (75% patient effort)  -AR     Gait, Assistive Device rolling walker  -AR     Gait, Distance (Feet) 50  -AR     Gait, Gait Deviations tamika decreased;decreased heel strike;forward flexed posture  -AR     Gait, Safety Issues step length decreased;weight-shifting ability decreased  -AR     Gait, Impairments impaired balance;strength decreased  -AR     Therapy Exercises    Bilateral Lower Extremities ankle pumps/circles;LAQ  -AR     Positioning and Restraints    Pre-Treatment Position in bed  -AR     Post Treatment Position bed  -AR     In Bed notified nsg;supine;call light within reach;encouraged to call for assist;exit alarm on  -AR       03/12/17 1600       General Information    Equipment Currently Used at Home cane, straight;walker, standard  -     Living Environment    Lives With spouse;child(toni), adult  -     Living Arrangements house  -     Home Accessibility no concerns  -     Stair Railings at Home none  -     Type of Financial/Environmental Concern none  -     Transportation Available car;family or friend will provide  -     Living Environment Comment lives with daughter and wife  -       User Key  (r) = Recorded By, (t) = Taken By, (c) = Cosigned By    Initials Name Provider Type    CHRISTIANO Robb MS CCC-SLP Speech and Language Pathologist    ALPHONSO Haddad, RN Registered Nurse    DES Mercado, PT Physical Therapist          Physical Therapy Education     Title: PT OT SLP Therapies (Done)     Topic: Physical Therapy (Done)     Point: Mobility training (Done)    Learning Progress Summary    Learner Readiness Method Response Comment Documented by Status   Patient Acceptance E VU  AR 03/13/17 1503 Done               Point:  Home exercise program (Done)    Learning Progress Summary    Learner Readiness Method Response Comment Documented by Status   Patient Acceptance E VU  AR 03/13/17 1503 Done               Point: Body mechanics (Done)    Learning Progress Summary    Learner Readiness Method Response Comment Documented by Status   Patient Acceptance E VU  AR 03/13/17 1503 Done               Point: Precautions (Done)    Learning Progress Summary    Learner Readiness Method Response Comment Documented by Status   Patient Acceptance E VU  AR 03/13/17 1503 Done                      User Key     Initials Effective Dates Name Provider Type Discipline    AR 06/27/16 -  Lexi Mercado PT Physical Therapist PT                PT Recommendation and Plan  Anticipated Discharge Disposition: skilled nursing facility, home with assist, home with home health (pending progress/level of assist family can provide)  Planned Therapy Interventions: balance training, bed mobility training, gait training, home exercise program, patient/family education, ROM (Range of Motion), stair training, strengthening  PT Frequency: daily             IP PT Goals       03/13/17 1502          Bed Mobility PT LTG    Bed Mobility PT LTG, Date Established 03/13/17  -AR      Bed Mobility PT LTG, Time to Achieve 1 wk  -AR      Bed Mobility PT LTG, Activity Type supine to sit/sit to supine  -AR      Bed Mobility PT LTG, [place] Level supervision required  -AR      Transfer Training PT LTG    Transfer Training PT LTG, Date Established 03/13/17  -AR      Transfer Training PT LTG, Time to Achieve 1 wk  -AR      Transfer Training PT LTG, Activity Type sit to stand/stand to sit;bed to chair /chair to bed  -AR      Transfer Training PT LTG, [place] Level supervision required  -AR      Transfer Training PT LTG, Assist Device walker, rolling  -AR      Gait Training PT LTG    Gait Training Goal PT LTG, Date Established 03/13/17  -AR      Gait Training Goal PT LTG, Time to  Achieve 1 wk  -AR      Gait Training Goal PT LTG, [place] Level supervision required  -AR      Gait Training Goal PT LTG, Assist Device walker, rolling  -AR      Gait Training Goal PT LTG, Distance to Achieve 150  -AR        User Key  (r) = Recorded By, (t) = Taken By, (c) = Cosigned By    Initials Name Provider Type    AR Lexi Mercado PT Physical Therapist                Outcome Measures       03/13/17 1500          How much help from another person do you currently need...    Turning from your back to your side while in flat bed without using bedrails? 3  -AR      Moving from lying on back to sitting on the side of a flat bed without bedrails? 3  -AR      Moving to and from a bed to a chair (including a wheelchair)? 3  -AR      Standing up from a chair using your arms (e.g., wheelchair, bedside chair)? 3  -AR      Climbing 3-5 steps with a railing? 2  -AR      To walk in hospital room? 3  -AR      AM-PAC 6 Clicks Score 17  -AR      Functional Assessment    Outcome Measure Options AM-PAC 6 Clicks Basic Mobility (PT)  -AR        User Key  (r) = Recorded By, (t) = Taken By, (c) = Cosigned By    Initials Name Provider Type    AR Lexi Mercado PT Physical Therapist           Time Calculation:         PT Charges       03/13/17 1503          Time Calculation    Start Time 1435  -AR      Stop Time 1503  -AR      Time Calculation (min) 28 min  -AR      PT Received On 03/13/17  -AR      PT - Next Appointment 03/14/17  -AR      PT Goal Re-Cert Due Date 03/20/17  -AR        User Key  (r) = Recorded By, (t) = Taken By, (c) = Cosigned By    Initials Name Provider Type    DES Mercado PT Physical Therapist          Therapy Charges for Today     Code Description Service Date Service Provider Modifiers Qty    39175475646 HC PT EVAL MOD COMPLEXITY 2 3/13/2017 Lexi Mercado, PT GP 1    64105743306 HC PT THER PROC EA 15 MIN 3/13/2017 Lexi Mercado PT GP 1          PT G-Codes  Outcome Measure Options: AM-PAC 6  Clicks Basic Mobility (PT)      Lexi Mercado, PT  3/13/2017

## 2017-03-13 NOTE — PROGRESS NOTES
"CC: Coronary artery disease    Interval History:   No complaints but not oriented to place or time    Vital Signs  Temp:  [97.8 °F (36.6 °C)-99.2 °F (37.3 °C)] 97.8 °F (36.6 °C)  Heart Rate:  [] 89  Resp:  [18] 18  BP: (109-147)/(60-83) 132/79    Intake/Output Summary (Last 24 hours) at 03/13/17 0728  Last data filed at 03/13/17 0500   Gross per 24 hour   Intake 1483.83 ml   Output    550 ml   Net 933.83 ml     Flowsheet Rows         First Filed Value    Admission Height  70\" (177.8 cm) Documented at 03/12/2017 1243    Admission Weight  200 lb (90.7 kg) Documented at 03/12/2017 1243          PHYSICAL EXAM:  General: No acute distress  Resp:NL Rate, unlabored, clear  CV:NL rate and irregular rhythm, NL PMI, Nl S1 and S2, no Mumur, no gallop, no rub, No JVD. Normal pedal pulses  ABD:Nl sounds, no masses or tenderness, nondistended, no quarding or rebound  Neuro: alert,cooperative and not oriented to place or time  Extr: No edema or cyanosis, moves all extremities      Results Review:      Results from last 7 days  Lab Units 03/13/17  0451   SODIUM mmol/L 139   POTASSIUM mmol/L 3.0*   CHLORIDE mmol/L 103   TOTAL CO2 mmol/L 19.6*   BUN mg/dL 18   CREATININE mg/dL 0.65*   GLUCOSE mg/dL 141*   CALCIUM mg/dL 8.0*       Results from last 7 days  Lab Units 03/13/17  0451 03/12/17  1304   TROPONIN T ng/mL 0.112* 0.145*       Results from last 7 days  Lab Units 03/13/17  0451   WBC 10*3/mm3 9.11   HEMOGLOBIN g/dL 8.3*   HEMATOCRIT % 23.9*   PLATELETS 10*3/mm3 128*       Results from last 7 days  Lab Units 03/13/17  0451 03/12/17  2240 03/12/17  1453   INR  1.13* 1.20* 1.27*           Results from last 7 days  Lab Units 03/12/17  1304   MAGNESIUM mg/dL 1.4*         @LABRCNT(bnp)@  I reviewed the patient's new clinical results.  I personally viewed and interpreted the patient's EKG/Telemetry data        Medication Review:   Meds reviewed      sodium chloride 50 mL/hr Last Rate: 50 mL/hr (03/12/17 1814) "       Assessment/Plan  1. Subdural hematoma, recent fall in February of this year INR elevated on admission over the bell reinstitute this at some time.  2. Warfarin toxicity as above now reversed holding warfarin  3. Elevated troponin: Doubt acute myocardial infarction, may be stress induced  4. History of severe coronary disease status post  coronary artery bypass grafting, mild left ventricular systolic dysfunction, left ventricular ejection fraction of 45%.  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  Perfusion stress test June 2016  no ischemia echocardiogram February 2017 normal left ventricular ejection fraction with concentric hypertrophy.  5. Chronic atrial fibrillation: On warfarin therapy. Stopped at present.   6. Anemia: Receive transfusion continue to follow.  7. Diabetes mellitus     Sea Sherwood MD  03/13/17  7:28 AM

## 2017-03-13 NOTE — CONSULTS
Neurosurgery (on-call MD unless specified)  Consult performed by: ALEXANDER WARD  Consult ordered by: NAZANIN JONES  Reason for consult: Subdural hematoma          Patient Care Team:  JANIE Vilchis as PCP - General (Family Medicine)  Basilio Briggs MD as Consulting Physician (Hematology and Oncology)  Sea Sherwood MD as Consulting Physician (Cardiology)    Chief complaint:    Subdural hematoma    Subjective     Altered Mental Status   This is a new problem. The current episode started in the past 7 days. The problem has been gradually improving. Associated symptoms include arthralgias, fatigue, headaches and weakness (L arm weak from chest hematoma and swelling extending into left arm). Pertinent negatives include no abdominal pain, anorexia, nausea, numbness or vomiting.   Head Injury    Associated symptoms include headaches and weakness (L arm weak from chest hematoma and swelling extending into left arm). Pertinent negatives include no numbness or vomiting.       Review of Systems   Constitutional: Positive for fatigue.   Gastrointestinal: Negative for abdominal pain, anorexia, nausea and vomiting.   Musculoskeletal: Positive for arthralgias.   Skin: Positive for color change (ecchymosis L chest and L arm with associated L arm swelling).   Neurological: Positive for weakness (L arm weak from chest hematoma and swelling extending into left arm) and headaches. Negative for numbness.   Psychiatric/Behavioral: Positive for confusion.   All other systems reviewed and are negative.       Past Medical History   Diagnosis Date   • Atrial fibrillation    • B12 deficiency    • Carotid artery disease    • Coronary artery disease    • Depression    • Diabetes mellitus    • History of thrombocytopenia    • Hyperlipidemia    • Hypertension    • Old myocardial infarct 03/2015   • Stroke    • Vision loss, left eye    ,   Past Surgical History   Procedure Laterality Date   • Coronary artery bypass graft         x 3   • Back surgery     • Carotid stent  2015   • Tonsillectomy and adenoidectomy     • Inguinal hernia repair     ,   Family History   Problem Relation Age of Onset   • Cancer Other    • Coronary artery disease Other    • Diabetes Other    • Hypertension Other    • Cancer Mother    • Diabetes Father    • Heart disease Father    • Stroke Father    • Diabetes Daughter    • Diabetes Son    • Heart disease Son    ,   Social History   Substance Use Topics   • Smoking status: Former Smoker     Quit date: 1995   • Smokeless tobacco: Former User      Comment: Quit 25 yrs ago   • Alcohol use No      Comment: quit in 1995   ,   Prescriptions Prior to Admission   Medication Sig Dispense Refill Last Dose   • acetaminophen (TYLENOL) 325 MG tablet Take 650 mg by mouth Every 6 (Six) Hours As Needed for mild pain (1-3).   Unknown at Unknown time   • atorvastatin (LIPITOR) 20 MG tablet Take 20 mg by mouth daily.   Unknown at Unknown time   • B-D ULTRAFINE III SHORT PEN 31G X 8 MM misc    Unknown at Unknown time   • Bioflavonoid Products (VITAMIN C PLUS PO) Take  by mouth Daily.   Taking   • insulin glargine (LANTUS) 100 UNIT/ML injection Inject 20 Units under the skin daily.   Unknown at Unknown time   • metFORMIN (GLUCOPHAGE) 1000 MG tablet 1,000 mg Daily With Breakfast.   Unknown at Unknown time   • metoprolol tartrate (LOPRESSOR) 50 MG tablet Take 50 mg by mouth 2 (Two) Times a Day.   Unknown at Unknown time   • nitroglycerin (NITROSTAT) 0.4 MG SL tablet Place 1 tablet under the tongue every 5 (five) minutes as needed for chest pain. 25 tablet 3 Unknown at Unknown time   • sertraline (ZOLOFT) 50 MG tablet 50 mg Every Night.   Unknown at Unknown time   • VITAMIN E PO Take  by mouth Daily.   Unknown at Unknown time   • warfarin (COUMADIN) 5 MG tablet Take 5 mg by mouth 3 (Three) Times a Week. Monday, Wednesday, Friday   Unknown at Unknown time   • warfarin (COUMADIN) 7.5 MG tablet Take 7.5 mg by mouth 4 (Four) Times a Week.  Sunday, Tuesday, Thursday, Saturday   Unknown at Unknown time   , Scheduled Meds:    insulin aspart 1-20 Units Subcutaneous Q4H   metoprolol tartrate 50 mg Oral Q12H   potassium & sodium phosphates 1 packet Oral BID   sennosides-docusate sodium 2 tablet Oral Nightly   , Continuous Infusions:    sodium chloride 50 mL/hr Last Rate: 50 mL/hr (03/12/17 1814)   , PRN Meds:  •  acetaminophen  •  dextrose  •  dextrose  •  glucagon (human recombinant)  •  ipratropium-albuterol  •  labetalol  •  magnesium sulfate **OR** magnesium sulfate **OR** magnesium sulfate  •  potassium chloride **OR** [DISCONTINUED] potassium chloride **OR** potassium chloride  •  sodium chloride  •  sodium chloride  •  Insert peripheral IV **AND** sodium chloride and Allergies:  No known drug allergy    Objective      Vital Signs  Temp:  [97.8 °F (36.6 °C)-99.2 °F (37.3 °C)] 98.1 °F (36.7 °C)  Heart Rate:  [] 89  Resp:  [18] 18  BP: (109-147)/(60-83) 132/79    Physical Exam   Constitutional: He is oriented to person, place, and time. He appears well-developed and well-nourished. He is cooperative. No distress.   HENT:   Head: Normocephalic and atraumatic. Head is without raccoon's eyes and without Soler's sign.   Eyes: Conjunctivae and EOM are normal. Pupils are equal, round, and reactive to light.   Neck: Normal range of motion. Neck supple.   Cardiovascular: Normal rate and regular rhythm.    Pulmonary/Chest: Effort normal. No respiratory distress.   Abdominal: Soft. He exhibits no distension. There is no tenderness.   Musculoskeletal: Normal range of motion. He exhibits tenderness (L upper arm and L chest wall). He exhibits no edema.   L arm weakness from swelling.   Neurological: He is alert and oriented to person, place, and time. He displays normal reflexes. No cranial nerve deficit or sensory deficit. Coordination normal. GCS eye subscore is 4. GCS verbal subscore is 5. GCS motor subscore is 6.   Reflex Scores:       Tricep reflexes are  2+ on the right side and 2+ on the left side.       Bicep reflexes are 2+ on the right side and 2+ on the left side.       Brachioradialis reflexes are 2+ on the right side and 2+ on the left side.       Patellar reflexes are 2+ on the right side and 2+ on the left side.       Achilles reflexes are 2+ on the right side and 2+ on the left side.  Negative Willis's; negative clonus. AA&O x 3. Face symmetric with intact sensation V1, V2, V3 bilaterally. Tongue midline. EOM's intact. PERRL. Follows commands x 4 extremities. Some L arm weakness and drift due to swelling in left upper arm from hematoma. Finger to nose intact bilaterally.    Skin: Skin is warm and dry. Ecchymosis noted. No rash noted. He is not diaphoretic.        Left chest wall and upper arm swelling, ecchymosis   Psychiatric: He has a normal mood and affect. Thought content normal.   Vitals reviewed.      Results Review:    I reviewed the patient's new clinical results.  I reviewed the patient's new imaging results and agree with the interpretation.  Discussed with Dr. Granados      Results for KARRIE MCKEON (MRN [id number]) as of 3/13/2017 11:21   Ref. Range 3/13/2017 04:51   WBC Latest Ref Range: 4.50 - 10.70 10*3/mm3 9.11   RBC Latest Ref Range: 4.60 - 6.00 10*6/mm3 2.65 (L)   Hemoglobin Latest Ref Range: 13.7 - 17.6 g/dL 8.3 (L)   Hematocrit Latest Ref Range: 40.4 - 52.2 % 23.9 (L)   RDW Latest Ref Range: 11.5 - 14.5 % 14.9 (H)   MCV Latest Ref Range: 79.8 - 96.2 fL 90.2   MCH Latest Ref Range: 27.0 - 32.7 pg 31.3   MCHC Latest Ref Range: 32.6 - 36.4 g/dL 34.7   MPV Latest Ref Range: 6.0 - 12.0 fL 9.0   Platelets Latest Ref Range: 140 - 500 10*3/mm3 128 (L)     Results for KARRIE MCKEON (MRN [id number]) as of 3/13/2017 11:21   Ref. Range 3/12/2017 13:04   Protime Latest Ref Range: 11.7 - 14.2 Seconds >100.0 (C)   INR Latest Ref Range: 0.90 - 1.10  >10.00 (C)     Results for KARRIE MCKEON (MRN [id number]) as of 3/13/2017 11:21   Ref. Range  3/13/2017 04:51   Protime Latest Ref Range: 11.7 - 14.2 Seconds 14.1   INR Latest Ref Range: 0.90 - 1.10  1.13 (H)       CT HEAD WO CONTRAST 3/13/17    No change in bilateral SDH. L frontotemporal SDH is larger in appearance. No significant mass effect.       Assessment/Plan     Active Problems:    Acute posthemorrhagic anemia      Assessment & Plan     Traumatic L>R acute subdural hematomas  Hx of fall one month ago; repeat fall 3 days ago  Hx of coumadin at home for Afib  Coagulopathy (INR > 10; now normal s/p reversal)  Confusion/encephalopathy  Acute blood loss anemia   L SI joint, L thorax and R iliopsoas hematomas      Continue to hold anticoagulation; watch platelets  Observe   Recheck head CT in am      I discussed the patients findings and my recommendations with patient and Dr. Darwin Saavedra, APRN  03/13/17  11:12 AM

## 2017-03-13 NOTE — PLAN OF CARE
Problem: Inpatient Physical Therapy  Goal: Bed Mobility Goal LTG- PT    03/13/17 1502   Bed Mobility PT LTG   Bed Mobility PT LTG, Date Established 03/13/17   Bed Mobility PT LTG, Time to Achieve 1 wk   Bed Mobility PT LTG, Activity Type supine to sit/sit to supine   Bed Mobility PT LTG, [place] Level supervision required       Goal: Transfer Training Goal 1 LTG- PT    03/13/17 1502   Transfer Training PT LTG   Transfer Training PT LTG, Date Established 03/13/17   Transfer Training PT LTG, Time to Achieve 1 wk   Transfer Training PT LTG, Activity Type sit to stand/stand to sit;bed to chair /chair to bed   Transfer Training PT LTG, [place] Level supervision required   Transfer Training PT LTG, Assist Device walker, rolling       Goal: Gait Training Goal LTG- PT    03/13/17 1502   Gait Training PT LTG   Gait Training Goal PT LTG, Date Established 03/13/17   Gait Training Goal PT LTG, Time to Achieve 1 wk   Gait Training Goal PT LTG, [place] Level supervision required   Gait Training Goal PT LTG, Assist Device walker, rolling   Gait Training Goal PT LTG, Distance to Achieve 150

## 2017-03-13 NOTE — PLAN OF CARE
Problem: Inpatient SLP  Goal: Dysphagia- Patient will safely consume diet as per recommendation with no signs/symptoms of aspiration    03/13/17 0822   Safely Consume Diet   Safely Consume Diet- SLP, Date Established 03/13/17   Safely Consume Diet- SLP, Time to Achieve by discharge

## 2017-03-13 NOTE — PROGRESS NOTES
Acute Care - Speech Language Pathology   Swallow Initial Evaluation [facility]     Patient Name: Aime Hernandez  : 1945  MRN: [id number]  Today's Date: 3/13/2017               Admit Date: 3/12/2017    SPEECH-LANGUAGE PATHOLOGY EVALUATION - SWALLOW  Subjective: The patient was seen on this date for a Clinical Swallow evaluation.  Patient was alert and cooperative. Slow to respond; mild confusion.  The patient's history is significant for B SDH, s/p fall.   Objective: Textures given included thin liquid, puree consistency and regular consistency.  Assessment: No s/s of aspiration with any PO.  SLP Findings:  Patient presents with functional swallow, without esophageal component.   Recommendations: Diet Textures: thin liquid, regular consistency food.  Medications should be taken whole with thin liquids or puree.  Recommended Strategies: Upright for PO and small bites and sips. Oral care before breakfast, after all meals and PRN.  Other Recommended Evaluations: Cognitive-Linguistic Evaluation    Dysphagia therapy is not recommended. Rationale: Will f/u for diet tolerance x1.    Visit Dx:     ICD-10-CM ICD-9-CM   1. Acute posthemorrhagic anemia D62 285.1   2. Warfarin-induced coagulopathy (severe) T45.511A 286.9    D68.9 E934.2   3. Occult GI bleeding R19.5 792.1   4. Elevated troponin R79.89 790.6   5. Bruising and hematoma to left chest and left abdomen T14.8 924.9   6. Chronic atrial fibrillation I48.2 427.31   7. Toxic metabolic encephalopathy G92 349.82   8. Hypomagnesemia E83.42 275.2   9. Contusion of left upper arm, initial encounter S40.022A 923.03   10. Subdural hematoma I62.00 432.1     Patient Active Problem List   Diagnosis   • B12 deficiency   • Thrombocytopenia   • Carotid artery stenosis   • CAD (coronary artery disease)   • Chronic a-fib   • DM type 2 (diabetes mellitus, type 2)   • Acute posthemorrhagic anemia     Past Medical History   Diagnosis Date   • Atrial fibrillation    • B12  deficiency    • Carotid artery disease    • Coronary artery disease    • Depression    • Diabetes mellitus    • History of thrombocytopenia    • Hyperlipidemia    • Hypertension    • Old myocardial infarct 03/2015   • Stroke    • Vision loss, left eye      Past Surgical History   Procedure Laterality Date   • Coronary artery bypass graft        x 3   • Back surgery     • Carotid stent  2015   • Tonsillectomy and adenoidectomy     • Inguinal hernia repair            SWALLOW EVALUATION (last 72 hours)      Swallow Evaluation       03/13/17 0800                Rehab Evaluation    Document Type evaluation  -CP        Subjective Information no complaints  -CP        General Information    Patient Profile Review yes  -CP        Current Diet Limitations NPO  -CP        Precautions/Limitations, Hearing WFL  -CP        Prior Level of Function- Swallowing no diet consistency restrictions  -CP        Plans/Goals Discussed With patient  -CP        Barriers to Rehab none identified  -CP        Clinical Impression    Patient's Goals For Discharge patient did not state  -CP        SLP Swallowing Diagnosis other (see comments)   WFL  -CP        Rehab Potential/Prognosis, Swallowing good, to achieve stated therapy goals  -CP        Criteria for Skilled Therapeutic Interventions Met skilled criteria for dysphagia intervention met  -CP        FCM, Swallowing 7-->Level 7  -CP        Therapy Frequency PRN  -CP        Predicted Duration Therapy Interv (days) until discharge  -CP        Expected Duration Therapy Session (min) 15-30 minutes  -CP        SLP Diet Recommendation regular textures;thin liquids  -CP        Recommended Feeding/Eating Techniques small sips/bites  -CP        SLP Rec. for Method of Medication Administration meds whole with thin liquid;meds whole in pudding/applesauce  -CP        Monitor For Signs Of Aspiration cough;gurgly voice;throat clearing  -CP        Anticipated Discharge Disposition home with assist  -CP         Cognitive Assessment/Intervention    Current Cognitive/Communication Assessment impaired  -CP        Orientation Status oriented to;person;place;other (see comments)   slow to respond; did not know birth year  -CP        Follows Commands/Answers Questions 75% of the time  -CP        Oral Motor Structure and Function    Oral Motor Anatomy and Physiology patient demonstrates anatomy that is WNL  -CP        Dentition Assessment upper dentures/partial in place;lower dentures/partial in place  -CP        Secretion Management WNL/WFL  -CP        Mucosal Quality moist, healthy  -CP        Volitional Swallow no difficulties initiating volitional swallow  -CP        Oral Musculature General Assessment WFL (within functional limits)  -CP          User Key  (r) = Recorded By, (t) = Taken By, (c) = Cosigned By    Initials Name Effective Dates    CP Samantha Robb, MS CCC-SLP 04/13/15 -         EDUCATION  The patient has been educated in the following areas:   Dysphagia (Swallowing Impairment).    SLP Recommendation and Plan  SLP Swallowing Diagnosis: other (see comments) (WFL)  SLP Diet Recommendation: regular textures, thin liquids  Recommended Feeding/Eating Techniques: small sips/bites  SLP Rec. for Method of Medication Administration: meds whole with thin liquid, meds whole in pudding/applesauce  Monitor For Signs Of Aspiration: cough, gurgly voice, throat clearing     Criteria for Skilled Therapeutic Interventions Met: skilled criteria for dysphagia intervention met  Anticipated Discharge Disposition: home with assist  Rehab Potential/Prognosis, Swallowing: good, to achieve stated therapy goals  Therapy Frequency: PRN                        IP SLP Goals       03/13/17 0822          Safely Consume Diet    Safely Consume Diet- SLP, Date Established 03/13/17  -CP      Safely Consume Diet- SLP, Time to Achieve by discharge  -CP        User Key  (r) = Recorded By, (t) = Taken By, (c) = Cosigned By    Initials Name Provider  Type    CP Samantha Robb MS CCC-SLP Speech and Language Pathologist             SLP Outcome Measures (last 72 hours)      SLP Outcome Measures       03/13/17 0800          SLP Outcome Measures    Outcome Measure Used? Adult NOMS  -CP      FCM Scores    FCM Chosen Swallowing  -CP      Swallowing FCM Score 7  -CP        User Key  (r) = Recorded By, (t) = Taken By, (c) = Cosigned By    Initials Name Effective Dates    CP Samantha Robb MS CCC-SLP 04/13/15 -            Time Calculation:         Time Calculation- SLP       03/13/17 0822          Time Calculation- SLP    SLP Received On 03/13/17  -CP        User Key  (r) = Recorded By, (t) = Taken By, (c) = Cosigned By    Initials Name Provider Type    CP Samantha Robb MS CCC-SLP Speech and Language Pathologist          Therapy Charges for Today     Code Description Service Date Service Provider Modifiers Qty    [id number]  ST EVAL ORAL PHARYNG SWALLOW 4 3/13/2017 Samantha Robb MS CCC-SLP GN 1               Samantha Robb MS CCC-MUSTAPHA  3/13/2017

## 2017-03-14 NOTE — PROGRESS NOTES
"CC: CAD/AFib    Interval History:   Still confused at times.    Repeat CT last night that was unchanged.  He denies any other complaints today.    Vital Signs  Temp:  [97.2 °F (36.2 °C)-98.5 °F (36.9 °C)] 98.5 °F (36.9 °C)  Heart Rate:  [] 101  BP: (103-172)/(66-92) 135/80    Intake/Output Summary (Last 24 hours) at 03/14/17 0705  Last data filed at 03/14/17 0400   Gross per 24 hour   Intake    650 ml   Output    400 ml   Net    250 ml     Flowsheet Rows         First Filed Value    Admission Height  70\" (177.8 cm) Documented at 03/12/2017 1243    Admission Weight  200 lb (90.7 kg) Documented at 03/12/2017 1243          PHYSICAL EXAM:  General: No acute distress  Resp:NL Rate, unlabored, clear  CV:NL rate and irregular rhythm, NL PMI, Nl S1 and S2, no Mumur, no gallop, no rub, No JVD. Normal pedal pulses  ABD:Nl sounds, no masses or tenderness, nondistended, no quarding or rebound  Neuro: alert,cooperative and not oriented to place a new date and  Was today  Extr: No edema or cyanosis, moves all extremities      Results Review:      Results from last 7 days  Lab Units 03/14/17  0401   SODIUM mmol/L 138   POTASSIUM mmol/L 3.7   CHLORIDE mmol/L 105   TOTAL CO2 mmol/L 21.7*   BUN mg/dL 17   CREATININE mg/dL 0.68*   GLUCOSE mg/dL 164*   CALCIUM mg/dL 8.1*       Results from last 7 days  Lab Units 03/13/17  0451 03/12/17  1304   TROPONIN T ng/mL 0.112* 0.145*       Results from last 7 days  Lab Units 03/14/17  0401   WBC 10*3/mm3 9.14   HEMOGLOBIN g/dL 8.6*   HEMATOCRIT % 25.0*   PLATELETS 10*3/mm3 171       Results from last 7 days  Lab Units 03/14/17  0401 03/13/17  1454 03/13/17  0451   INR  1.18* 1.11* 1.13*           Results from last 7 days  Lab Units 03/13/17  1807   MAGNESIUM mg/dL 1.8         @LABRCNT(bnp)@  I reviewed the patient's new clinical results.  I personally viewed and interpreted the patient's EKG/Telemetry data        Medication Review:   Meds reviewed      sodium chloride 50 mL/hr Last Rate: " 50 mL/hr (03/12/17 1814)       Assessment/Plan  1. Subdural hematoma, recent fall in February of this year INR elevated on admission Reinstitute warfarin at some time.  Repeat CT last night unchanged.  Still confused but to me appears more oriented.  2. Warfarin toxicity as above now reversed holding warfarin  3. Elevated troponin: Doubt acute myocardial infarction, may be stress induced  4. History of severe coronary disease status post  coronary artery bypass grafting, mild left ventricular systolic dysfunction, left ventricular ejection fraction of 45%. 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  Perfusion stress test June 2016 no ischemia echocardiogram February 2017 normal left ventricular ejection fraction with concentric hypertrophy.  5. Chronic atrial fibrillation: On warfarin therapy. Stopped at present.   6. Anemia: Receive transfusion continue to follow. HB stable at 8.6  7. Diabetes mellitus     Sea Sherwood MD  03/14/17  7:05 AM

## 2017-03-14 NOTE — PROGRESS NOTES
[facility]     Mar/chart reviewed  F/u ICH and acute blood loss anemia. Mild headache, no weakness or visual changes.      Vital Sign Min/Max for last 24 hours  Temp  Min: 98 °F (36.7 °C)  Max: 98.5 °F (36.9 °C)   BP  Min: 103/66  Max: 172/81   Pulse  Min: 68  Max: 113   No Data Recorded   SpO2  Min: 96 %  Max: 100 %   No Data Recorded   No Data Recorded     Nad, axox1,   perrl, eomi, no icterus,  mmm, no jvd, trachea midline, neck supple,  chest cta bilaterally, no crackles, no wheezes,   rrr,   soft, nt, nd +bs,  no c/c/ e    Labs:  Wbc 9.14  hgb 8.6  plts 171  Cr 0.68  Bicarb 21.7    Ct head: unchanged    A/P:  1. Acute subdural hematoma -- corrected coagulopathy, Systolic <160  2. Multiple hematomas -- axillary, si joing, iliopsoas -- corrected coagulopathy  3. Acute blood loss anemia -- monitor transfuse as needed, I think his hematomas explain it; he did have guiac positive stool in er but I don't think this is the major source of blood loss  4. Elevated troponin -- will have cardiology to see  5. coagulopathy -- given kcentra and vit k in er per protocol, most recent check, inr is ok.  6. Dm/hyperglycemia -- ssi as per icu protocol  7. afib -- rate controlled, continue to hold anticoagulation  8. Confusion -- suspect encephalopathy 2/2 #1  9. bp control -- keep systolic less than 160    Transfer out of ICU        Out of ICU;  Suspect he is going to need rehab of some sort.

## 2017-03-14 NOTE — PROGRESS NOTES
Acute Care - Occupational Therapy Initial Evaluation  [facility]     Patient Name: Aime Hernandez  : 1945  MRN: [id number]  Today's Date: 3/14/2017  Onset of Illness/Injury or Date of Surgery Date: 17  Date of Referral to OT: 17  Referring Physician: Alicia    Admit Date: 3/12/2017       ICD-10-CM ICD-9-CM   1. Acute posthemorrhagic anemia D62 285.1   2. Warfarin-induced coagulopathy (severe) T45.511A 286.9    D68.9 E934.2   3. Occult GI bleeding R19.5 792.1   4. Elevated troponin R79.89 790.6   5. Bruising and hematoma to left chest and left abdomen T14.8 924.9   6. Chronic atrial fibrillation I48.2 427.31   7. Toxic metabolic encephalopathy G92 349.82   8. Hypomagnesemia E83.42 275.2   9. Contusion of left upper arm, initial encounter S40.022A 923.03   10. Subdural hematoma I62.00 432.1     Patient Active Problem List   Diagnosis   • B12 deficiency   • Thrombocytopenia   • Carotid artery stenosis   • CAD (coronary artery disease)   • Chronic a-fib   • DM type 2 (diabetes mellitus, type 2)   • Acute posthemorrhagic anemia     Past Medical History   Diagnosis Date   • Atrial fibrillation    • B12 deficiency    • Carotid artery disease    • Coronary artery disease    • Depression    • Diabetes mellitus    • History of thrombocytopenia    • Hyperlipidemia    • Hypertension    • Old myocardial infarct 2015   • Stroke    • Vision loss, left eye      Past Surgical History   Procedure Laterality Date   • Coronary artery bypass graft        x 3   • Back surgery     • Carotid stent     • Tonsillectomy and adenoidectomy     • Inguinal hernia repair            OT ASSESSMENT FLOWSHEET (last 72 hours)      OT Evaluation       17 1330 17 1132 17 1446 17 0800 17 1600    Rehab Evaluation    Document Type evaluation  -AF therapy note (daily note)  -CH evaluation  -AR evaluation  -CP     Subjective Information agree to therapy;no complaints  -AF agree to therapy  -CH  agree to therapy  -AR no complaints  -CP     Patient Effort, Rehab Treatment good  -AF good  -CH good  -AR      Symptoms Noted During/After Treatment  none  -CH fatigue  -AR      General Information    Patient Profile Review yes  -AF  yes  -AR      Onset of Illness/Injury or Date of Surgery Date 03/12/17  -AF  03/12/17  -AR      Referring Physician Alicia  -AF        General Observations supine in bed eating lunch with son jovany  -AF        Pertinent History Of Current Problem B SDH  -AF        Precautions/Limitations fall precautions   decreased cognition  -AF fall precautions  -CH fall precautions  -AR      Prior Level of Function min assist:  -AF  min assist:  -AR      Equipment Currently Used at Home --   cane  -AF  walker, rolling  -AR  cane, straight;walker, standard  -MC    Plans/Goals Discussed With patient  -AF        Risks Reviewed patient:  -AF        Barriers to Rehab   none identified  -AR      Living Environment    Lives With spouse;child(toni), adult  -AF    spouse;child(toni), adult  -MC    Living Arrangements   house  -AR  house  -MC    Home Accessibility   stairs to enter home  -AR  no concerns  -MC    Number of Stairs to Enter Home   1  -AR      Stair Railings at Home     none  -MC    Type of Financial/Environmental Concern     none  -MC    Transportation Available     car;family or friend will provide  -MC    Living Environment Comment     lives with daughter and wife  -MC    Clinical Impression    Date of Referral to OT 03/14/17  -AF        OT Diagnosis deficits present with overall endurance/strength limited LUE ROM and strength and cognition limits his independence wiht ADL's and sfae transfers  -AF        Patient/Family Goals Statement to go home  -AF        Criteria for Skilled Therapeutic Interventions Met yes;treatment indicated  -AF        Rehab Potential good, to achieve stated therapy goals  -AF        Therapy Frequency 3-5 times/wk  -AF        Predicted Duration of Therapy  Intervention (days/wks) 5 days  -AF        Anticipated Equipment Needs At Discharge bathing equipment  -AF        Functional Level Prior    Ambulation     0-->independent  -    Transferring     0-->independent  -    Toileting     0-->independent  -    Bathing     0-->independent  -    Dressing     0-->independent  -    Eating     0-->independent  -    Communication     0-->understands/communicates without difficulty  -    Swallowing     0-->swallows foods/liquids without difficulty  -    Prior Functional Level Comment     walking and functioning at home  -    Vital Signs    O2 Delivery Pre Treatment   room air  -AR      Pain Assessment    Pain Assessment No/denies pain  -AF No/denies pain  -CH No/denies pain  -AR      Vision Assessment/Intervention    Visual Impairment Comment depth preception off when tyring to fill up cup at sink after brushing teeth  -AF        Cognitive Assessment/Intervention    Current Cognitive/Communication Assessment impaired  -AF impaired  -CH impaired  -AR impaired  -CP     Orientation Status oriented to;person  -AF oriented to;person   pt fixated on calling his wife  -CH oriented to;person  -AR oriented to;person;place;other (see comments)   slow to respond; did not know birth year  -CP     Follows Commands/Answers Questions 75% of the time;able to follow single-step instructions;needs cueing;needs repetition;needs increased time  -AF 75% of the time;able to follow single-step instructions;needs cueing;needs increased time;needs repetition  -CH 75% of the time;able to follow single-step instructions;needs cueing;needs increased time  -AR 75% of the time  -CP     Personal Safety impulsive;mild impairment;decreased insight to deficits  -AF mild impairment;impulsive  -CH impulsive;mild impairment  -AR      Personal Safety Interventions fall prevention program maintained;gait belt;nonskid shoes/slippers when out of bed  -AF fall prevention program maintained;gait  belt;nonskid shoes/slippers when out of bed  -CH       ROM (Range of Motion)    General ROM   no range of motion deficits identified  -AR      General ROM Detail limited LUE AROM to 2/3, PROM slighlty further  -AF        MMT (Manual Muscle Testing)    General MMT Assessment   --   B LE -4/5  -AR      General MMT Assessment Detail weaker LUE and   -AF        Bed Mobility, Assessment/Treatment    Bed Mobility, Assistive Device   bed rails;head of bed elevated  -AR      Bed Mob, Supine to Sit, [place] contact guard assist;verbal cues required  -AF verbal cues required;nonverbal cues required (demo/gesture);contact guard assist;2 person assist required  -CH minimum assist (75% patient effort)  -AR      Bed Mob, Sit to Supine, [place] contact guard assist;verbal cues required  -AF verbal cues required;nonverbal cues required (demo/gesture);contact guard assist;2 person assist required  -CH minimum assist (75% patient effort)  -AR      Transfer Assessment/Treatment    Transfers, Bed-Chair [place] minimum assist (75% patient effort);verbal cues required  -AF        Transfers, Chair-Bed [place] minimum assist (75% patient effort);verbal cues required  -AF        Transfers, Sit-Stand [place] contact guard assist;verbal cues required  -AF verbal cues required;nonverbal cues required (demo/gesture);contact guard assist;2 person assist required  -CH minimum assist (75% patient effort)  -AR      Transfers, Stand-Sit [place] contact guard assist;verbal cues required  -AF verbal cues required;nonverbal cues required (demo/gesture);contact guard assist;2 person assist required  -CH minimum assist (75% patient effort)  -AR      Transfers, Sit-Stand-Sit, Assist Device  rolling walker  -CH rolling walker  -AR      Toilet Transfer, [place] moderate assist (50% patient effort);verbal cues required  -AF        Toilet Transfer, Assistive Device --   grab bars  -AF        Transfer, Impairments --    decreased strength  -AF        Transfer, Comment   cues for UE placement/safety  -AR      Functional Mobility    Functional Mobility- Comment walked around the room with CGA/hand held assist fatigued at end of session  -AF        Lower Body Dressing Assessment/Training    LB Dressing Assess/Train, Comment donned with CGA seated in chair, MIN to don sock  -AF        Grooming Assessment/Training    Grooming Assess/Train, Position standing;sink side  -AF        Grooming Assess/Train, Indepen Level contact guard assist;verbal cues required  -AF        Grooming Assess/Train, Impairments impaired balance;strength decreased  -AF        Balance Skills Training    Standing-Level of Assistance Contact guard  -AF        Static Standing Balance Support No upper extremity supported  -AF        Standing-Balance Activities --   grooming at sink  -AF        Standing Balance # of Minutes 4-5 mins  -AF        Therapy Exercises    Bilateral Lower Extremities   ankle pumps/circles;LAQ  -AR      General Therapy Interventions    Planned Therapy Interventions activity intolerance;ADL retraining;home exercise program;transfer training   safety awareness  -AF        Positioning and Restraints    Pre-Treatment Position in bed  -AF in bed  -CH in bed  -AR      Post Treatment Position bed  -AF bed  -CH bed  -AR      In Bed notified nsg;supine;call light within reach;encouraged to call for assist;exit alarm on  -AF supine;call light within reach;encouraged to call for assist  -CH notified nsg;supine;call light within reach;encouraged to call for assist;exit alarm on  -AR        User Key  (r) = Recorded By, (t) = Taken By, (c) = Cosigned By    Initials Name Effective Dates    CP Samantha Robb, MS LARSEN-SLP 04/13/15 -      Dorothy Stanford, PT 12/01/15 -     ALPHONSO Haddad, RN 06/16/16 -     AF Saritha Sherman, OTR 12/01/15 -     AR Lexi Mercado, PT 06/27/16 -            Occupational Therapy Education     Title: PT OT SLP Therapies  (Active)     Topic: Occupational Therapy (Active)     Point: ADL training (Active)    Description: Instruct learner(s) on proper safety adaptation and remediation techniques during self care or transfers.   Instruct in proper use of assistive devices.    Learning Progress Summary    Learner Readiness Method Response Comment Documented by Status   Patient Acceptance E NR safety with ADLs and transfers AF 03/14/17 1336 Active                      User Key     Initials Effective Dates Name Provider Type Discipline    AF 12/01/15 -  PAULA Daily Occupational Therapist OT                  OT Recommendation and Plan  Anticipated Equipment Needs At Discharge: bathing equipment  Planned Therapy Interventions: activity intolerance, ADL retraining, home exercise program, transfer training (safety awareness)  Therapy Frequency: 3-5 times/wk             OT Goals       03/14/17 1337          Transfer Training OT LTG    Transfer Training OT LTG, Date Established 03/14/17  -AF      Transfer Training OT LTG, Time to Achieve by discharge  -AF      Transfer Training OT LTG, Activity Type toilet  -AF      Transfer Training OT LTG, [place] Level supervision required;verbal cues required  -AF      Transfer Training OT LTG, Outcome goal ongoing  -AF      Patient Education OT LTG    Patient Education OT LTG, Date Established 03/14/17  -AF      Patient Education OT LTG, Time to Achieve by discharge  -AF      Patient Education OT LTG, Additional Goal safety with ADL's  -AF      Patient Education OT LTG Outcome goal ongoing  -AF      ADL OT LTG    ADL OT LTG, Date Established 03/14/17  -AF      ADL OT LTG, Time to Achieve by discharge  -AF      ADL OT LTG, Activity Type ADL skills  -AF      ADL OT LTG, [place] Level contact guard;standby assist  -AF      ADL OT LTG, Outcome goal ongoing  -AF        User Key  (r) = Recorded By, (t) = Taken By, (c) = Cosigned By    Initials Name Provider Type    AF PAULA Daily  Occupational Therapist                Outcome Measures       03/14/17 1337 03/14/17 1200 03/13/17 1500    How much help from another person do you currently need...    Turning from your back to your side while in flat bed without using bedrails?  3  -CH 3  -AR    Moving from lying on back to sitting on the side of a flat bed without bedrails?  3  -CH 3  -AR    Moving to and from a bed to a chair (including a wheelchair)?  3  -CH 3  -AR    Standing up from a chair using your arms (e.g., wheelchair, bedside chair)?  3  -CH 3  -AR    Climbing 3-5 steps with a railing?  2  -CH 2  -AR    To walk in hospital room?  3  -CH 3  -AR    AM-PAC 6 Clicks Score  17  -CH 17  -AR    How much help from another is currently needed...    Putting on and taking off regular lower body clothing? 3  -AF      Bathing (including washing, rinsing, and drying) 3  -AF      Toileting (which includes using toilet bed pan or urinal) 3  -AF      Putting on and taking off regular upper body clothing 3  -AF      Taking care of personal grooming (such as brushing teeth) 3  -AF      Eating meals 3  -AF      Score 18  -AF      Functional Assessment    Outcome Measure Options AM-PAC 6 Clicks Daily Activity (OT)  -AF AM-PAC 6 Clicks Basic Mobility (PT)  -CH AM-PAC 6 Clicks Basic Mobility (PT)  -AR      User Key  (r) = Recorded By, (t) = Taken By, (c) = Cosigned By    Initials Name Provider Type    RUSSELL Stanford, PT Physical Therapist    AF Saritha Sherman, OTR Occupational Therapist    DES Mercado PT Physical Therapist          Time Calculation:   OT Start Time: 1250  OT Stop Time: 1309  OT Time Calculation (min): 19 min    Therapy Charges for Today     Code Description Service Date Service Provider Modifiers Qty    [id number] HC OT EVAL LOW COMPLEXITY 2 3/14/2017 PAULA Daily GO 1               PAULA Daily  3/14/2017

## 2017-03-14 NOTE — PLAN OF CARE
Problem: Fall Risk (Adult)  Goal: Absence of Falls  Outcome: Ongoing (interventions implemented as appropriate)    03/14/17 1829   Fall Risk (Adult)   Absence of Falls making progress toward outcome         Problem: Confusion, Acute (Adult)  Goal: Cognitive/Functional Impairments Minimized  Outcome: Ongoing (interventions implemented as appropriate)    03/14/17 1829   Confusion, Acute (Adult)   Cognitive/Functional Impairments Minimized unable to achieve outcome       Goal: Safety  Outcome: Ongoing (interventions implemented as appropriate)    Problem: Stroke (Hemorrhagic) (Adult)  Goal: Signs and Symptoms of Listed Potential Problems Will be Absent or Manageable (Stroke)  Outcome: Ongoing (interventions implemented as appropriate)    03/14/17 1829   Stroke (Hemorrhagic)   Problems Assessed (Stroke (Hemorrhagic)) cognitive impairment;situational response   Problems Present (Stroke (Hemorrhagic)) cognitive impairment         Problem: Diabetes, Type 2 (Adult)  Goal: Signs and Symptoms of Listed Potential Problems Will be Absent or Manageable (Diabetes, Type 2)  Outcome: Ongoing (interventions implemented as appropriate)    03/14/17 1829   Diabetes, Type 2   Problems Assessed (Type 2 Diabetes) all   Problems Present (Type 2 Diabetes) impaired glycemic control         Problem: Patient Care Overview (Adult)  Goal: Plan of Care Review  Outcome: Ongoing (interventions implemented as appropriate)    Problem: SAFETY - NON-VIOLENT RESTRAINT  Goal: Remains free of injury from restraints (Non-Violent Restraint)  Outcome: Ongoing (interventions implemented as appropriate)  Goal: Free from restraint(s) (Non-Violent Restraint)  Outcome: Ongoing (interventions implemented as appropriate)

## 2017-03-14 NOTE — PROGRESS NOTES
"Pt without complaint. He wants to go home. \"I don't have very good luck.\"  CT this am reviewed.  Shows bilateral SDH, L>R, stable compared to yesterday. No new bleeding, no MLS.  Pt has been agitated per RN, pulling at IVs     AVSS  ..    Results from last 7 days  Lab Units 03/14/17  0401   WBC 10*3/mm3 9.14   HEMOGLOBIN g/dL 8.6*   HEMATOCRIT % 25.0*   PLATELETS 10*3/mm3 171     ..    Results from last 7 days  Lab Units 03/14/17  0401   SODIUM mmol/L 138   POTASSIUM mmol/L 3.7   CHLORIDE mmol/L 105   TOTAL CO2 mmol/L 21.7*   BUN mg/dL 17   CREATININE mg/dL 0.68*   GLUCOSE mg/dL 164*   CALCIUM mg/dL 8.1*     ..    Results from last 7 days  Lab Units 03/14/17  0401   INR  1.18*       AA,Ox person, date of birth.  Could not name correct month or year, said he was in \"Dr. Sherwood's office\".  JAY well on command, no focal motor deficits  No facial asymmetry    CABG/Afib, hx of MCA stroke-on coumadin till this admission  Fell in Feb  Blood loss anemia  Bilateral SDH  Multiple body hematomas    Can go to floor from our standpoint  q4 hr neuro checks  CT in am  ST/OT/PT  "

## 2017-03-14 NOTE — PLAN OF CARE
Problem: Fall Risk (Adult)  Goal: Identify Related Risk Factors and Signs and Symptoms  Outcome: Outcome(s) achieved Date Met:  03/14/17  Goal: Absence of Falls  Outcome: Ongoing (interventions implemented as appropriate)    Problem: Confusion, Acute (Adult)  Goal: Identify Related Risk Factors and Signs and Symptoms  Outcome: Outcome(s) achieved Date Met:  03/14/17  Goal: Cognitive/Functional Impairments Minimized  Outcome: Ongoing (interventions implemented as appropriate)  Goal: Safety  Outcome: Ongoing (interventions implemented as appropriate)    Problem: Stroke (Hemorrhagic) (Adult)  Goal: Signs and Symptoms of Listed Potential Problems Will be Absent or Manageable (Stroke)  Outcome: Ongoing (interventions implemented as appropriate)

## 2017-03-14 NOTE — PLAN OF CARE
Problem: Inpatient Occupational Therapy  Goal: Transfer Training Goal 1 LTG- OT  Outcome: Ongoing (interventions implemented as appropriate)  Goal: Patient Education Goal LTG- OT  Outcome: Ongoing (interventions implemented as appropriate)  Goal: ADL Goal LTG- OT  Outcome: Ongoing (interventions implemented as appropriate)

## 2017-03-14 NOTE — PROGRESS NOTES
Acute Care - Physical Therapy Treatment Note  [facility]     Patient Name: Aime Hernandez  : 1945  MRN: [id number]  Today's Date: 3/14/2017  Onset of Illness/Injury or Date of Surgery Date: 17          Admit Date: 3/12/2017    Visit Dx:    ICD-10-CM ICD-9-CM   1. Acute posthemorrhagic anemia D62 285.1   2. Warfarin-induced coagulopathy (severe) T45.511A 286.9    D68.9 E934.2   3. Occult GI bleeding R19.5 792.1   4. Elevated troponin R79.89 790.6   5. Bruising and hematoma to left chest and left abdomen T14.8 924.9   6. Chronic atrial fibrillation I48.2 427.31   7. Toxic metabolic encephalopathy G92 349.82   8. Hypomagnesemia E83.42 275.2   9. Contusion of left upper arm, initial encounter S40.022A 923.03   10. Subdural hematoma I62.00 432.1     Patient Active Problem List   Diagnosis   • B12 deficiency   • Thrombocytopenia   • Carotid artery stenosis   • CAD (coronary artery disease)   • Chronic a-fib   • DM type 2 (diabetes mellitus, type 2)   • Acute posthemorrhagic anemia               Adult Rehabilitation Note       17 1132          Rehab Assessment/Intervention    Discipline physical therapist  -      Document Type therapy note (daily note)  -      Subjective Information agree to therapy  -CH      Patient Effort, Rehab Treatment good  -CH      Symptoms Noted During/After Treatment none  -CH      Precautions/Limitations fall precautions  -CH      Recorded by [CH] Dorothy Stanford, PT      Pain Assessment    Pain Assessment No/denies pain  -CH      Recorded by [CH] Dorothy Stanford, PT      Cognitive Assessment/Intervention    Current Cognitive/Communication Assessment impaired  -      Orientation Status oriented to;person   pt fixated on calling his wife  -CH      Follows Commands/Answers Questions 75% of the time;able to follow single-step instructions;needs cueing;needs increased time;needs repetition  -      Personal Safety mild impairment;impulsive  -      Personal Safety  Interventions fall prevention program maintained;gait belt;nonskid shoes/slippers when out of bed  -CH      Recorded by [CH] Dorothy Stanford PT      Bed Mobility, Assessment/Treatment    Bed Mob, Supine to Sit, [place] verbal cues required;nonverbal cues required (demo/gesture);contact guard assist;2 person assist required  -      Bed Mob, Sit to Supine, [place] verbal cues required;nonverbal cues required (demo/gesture);contact guard assist;2 person assist required  -CH      Recorded by [RUSSELL] Dorothy Stanford PT      Transfer Assessment/Treatment    Transfers, Sit-Stand [place] verbal cues required;nonverbal cues required (demo/gesture);contact guard assist;2 person assist required  -      Transfers, Stand-Sit [place] verbal cues required;nonverbal cues required (demo/gesture);contact guard assist;2 person assist required  -      Transfers, Sit-Stand-Sit, Assist Device rolling walker  -      Recorded by [CH] Dorothy Stanford PT      Gait Assessment/Treatment    Gait, [place] Level verbal cues required;nonverbal cues required (demo/gesture);contact guard assist;2 person assist required  -      Gait, Assistive Device rolling walker  -      Gait, Distance (Feet) 150  -      Gait, Gait Deviations tamika decreased;forward flexed posture;step length decreased;stride length decreased  -      Gait, Safety Issues step length decreased  -      Gait, Impairments impaired balance  -CH      Recorded by [CH] Dorothy Stanford PT      Positioning and Restraints    Pre-Treatment Position in bed  -CH      Post Treatment Position bed  -CH      In Bed supine;call light within reach;encouraged to call for assist  -CH      Recorded by [CH] Dorothy Stanford PT        User Key  (r) = Recorded By, (t) = Taken By, (c) = Cosigned By    Initials Name Effective Dates    RUSSELL Stanford PT 12/01/15 -                 IP PT Goals       03/13/17 1502          Bed Mobility PT LTG    Bed  Mobility PT LTG, Date Established 03/13/17  -AR      Bed Mobility PT LTG, Time to Achieve 1 wk  -AR      Bed Mobility PT LTG, Activity Type supine to sit/sit to supine  -AR      Bed Mobility PT LTG, [place] Level supervision required  -AR      Transfer Training PT LTG    Transfer Training PT LTG, Date Established 03/13/17  -AR      Transfer Training PT LTG, Time to Achieve 1 wk  -AR      Transfer Training PT LTG, Activity Type sit to stand/stand to sit;bed to chair /chair to bed  -AR      Transfer Training PT LTG, [place] Level supervision required  -AR      Transfer Training PT LTG, Assist Device walker, rolling  -AR      Gait Training PT LTG    Gait Training Goal PT LTG, Date Established 03/13/17  -AR      Gait Training Goal PT LTG, Time to Achieve 1 wk  -AR      Gait Training Goal PT LTG, [place] Level supervision required  -AR      Gait Training Goal PT LTG, Assist Device walker, rolling  -AR      Gait Training Goal PT LTG, Distance to Achieve 150  -AR        User Key  (r) = Recorded By, (t) = Taken By, (c) = Cosigned By    Initials Name Provider Type    DES Mercado PT Physical Therapist          Physical Therapy Education     Title: PT OT SLP Therapies (Done)     Topic: Physical Therapy (Done)     Point: Mobility training (Done)    Learning Progress Summary    Learner Readiness Method Response Comment Documented by Status   Patient Acceptance PAIGE JURADO D VU,NR   03/14/17 1200 Done    Acceptance E VU  AR 03/13/17 1503 Done               Point: Home exercise program (Done)    Learning Progress Summary    Learner Readiness Method Response Comment Documented by Status   Patient Acceptance E VU  AR 03/13/17 1503 Done               Point: Body mechanics (Done)    Learning Progress Summary    Learner Readiness Method Response Comment Documented by Status   Patient Acceptance PAIGE JURADO D VU,NR   03/14/17 1200 Done    Acceptance E VU  AR 03/13/17 1503 Done               Point: Precautions (Done)     Learning Progress Summary    Learner Readiness Method Response Comment Documented by Status   Patient Acceptance E,TB,D VU,NR   03/14/17 1200 Done    Acceptance E VU  AR 03/13/17 1503 Done                      User Key     Initials Effective Dates Name Provider Type Discipline     12/01/15 -  Dorothy Stanford, PT Physical Therapist PT    AR 06/27/16 -  Lexi Mercado PT Physical Therapist PT                    PT Recommendation and Plan  Anticipated Discharge Disposition: skilled nursing facility, home with assist, home with home health (pending progress/level of assist family can provide)  Planned Therapy Interventions: balance training, bed mobility training, gait training, home exercise program, patient/family education, ROM (Range of Motion), stair training, strengthening  PT Frequency: daily  Plan of Care Review  Plan Of Care Reviewed With: patient  Progress: improving  Outcome Summary/Follow up Plan: Pt demonstrates increased activity tolerance and functional strength as he was able to increase his gait distance and required less assistance today. PT will continue to follow to address strength, balance, and mobility.          Outcome Measures       03/14/17 1200 03/13/17 1500       How much help from another person do you currently need...    Turning from your back to your side while in flat bed without using bedrails? 3  -CH 3  -AR     Moving from lying on back to sitting on the side of a flat bed without bedrails? 3  -CH 3  -AR     Moving to and from a bed to a chair (including a wheelchair)? 3  -CH 3  -AR     Standing up from a chair using your arms (e.g., wheelchair, bedside chair)? 3  -CH 3  -AR     Climbing 3-5 steps with a railing? 2  -CH 2  -AR     To walk in hospital room? 3  -CH 3  -AR     AM-PAC 6 Clicks Score 17  -CH 17  -AR     Functional Assessment    Outcome Measure Options AM-PAC 6 Clicks Basic Mobility (PT)  -CH AM-PAC 6 Clicks Basic Mobility (PT)  -AR       User Key  (r) = Recorded By,  (t) = Taken By, (c) = Cosigned By    Initials Name Provider Type     Dorothy Stanford, PT Physical Therapist    DES Mercado PT Physical Therapist           Time Calculation:         PT Charges       03/14/17 1203          Time Calculation    Start Time 1122  -CH      Stop Time 1132  -CH      Time Calculation (min) 10 min  -      PT Received On 03/14/17  -      PT - Next Appointment 03/14/17  -        User Key  (r) = Recorded By, (t) = Taken By, (c) = Cosigned By    Initials Name Provider Type     Dorothy Stanford, PT Physical Therapist          Therapy Charges for Today     Code Description Service Date Service Provider Modifiers Qty    60285148907 HC PT THER PROC EA 15 MIN 3/14/2017 Dorothy Stanford, PT GP 1    37130406191 HC PT THER SUPP EA 15 MIN 3/14/2017 Dorothy Stanford, PT GP 1          PT G-Codes  Outcome Measure Options: AM-PAC 6 Clicks Basic Mobility (PT)    Dorothy Stanford PT  3/14/2017

## 2017-03-14 NOTE — PLAN OF CARE
Problem: Patient Care Overview (Adult)  Goal: Plan of Care Review  Outcome: Ongoing (interventions implemented as appropriate)    03/14/17 1201   Coping/Psychosocial Response Interventions   Plan Of Care Reviewed With patient   Patient Care Overview   Progress improving   Outcome Evaluation   Outcome Summary/Follow up Plan Pt demonstrates increased activity tolerance and functional strength as he was able to increase his gait distance and required less assistance today. PT will continue to follow to address strength, balance, and mobility.

## 2017-03-15 NOTE — PROGRESS NOTES
"   LOS: 3 days   Patient Care Team:  JANIE Vilchis as PCP - General (Family Medicine)  Basilio Briggs MD as Consulting Physician (Hematology and Oncology)  Sea Sherwood MD as Consulting Physician (Cardiology)    Chief Complaint:     Subdural hematomas    Subjective     Altered Mental Status   This is a new problem. The current episode started in the past 7 days. The problem occurs intermittently. The problem has been gradually improving. Pertinent negatives include no chest pain, fever, nausea, vomiting or weakness. Treatments tried: Doing better with seroquel at night.       Subjective:  Symptoms:  Improved.  No shortness of breath, chest pain, weakness or headache.    Diet:  Adequate intake.  No nausea or vomiting.    Pain:  He reports no pain.        History taken from: patient chart RN    Objective     Vital Signs  Temp:  [97.5 °F (36.4 °C)-98.7 °F (37.1 °C)] 97.7 °F (36.5 °C)  Heart Rate:  [] 97  BP: (114-175)/(64-94) 135/93    Objective:  General Appearance:  Comfortable.    Vital signs: (most recent): Blood pressure 135/93, pulse 97, temperature 97.7 °F (36.5 °C), temperature source Oral, resp. rate 18, height 70\" (177.8 cm), weight 200 lb (90.7 kg), SpO2 98 %.  Vital signs are normal.  No fever.    HEENT: Normal HEENT exam.    Heart: Normal rate.    Extremities: (L arm swelling, ecchymosis improving.)  Neurological: Patient is alert and oriented to person, place and time.  GCS score is 15.  (Follows commands x 4 extremities. No drift. EOMs intact. ).    Pupils:  Pupils are equal, round, and reactive to light.    Skin:  Warm and dry.              Results Review:     I reviewed the patient's new clinical results.  I reviewed the patient's new imaging results and agree with the interpretation.  Discussed with Dr. Granados     Results for KARRIE MCKEON (MRN [id number]) as of 3/15/2017 12:20   Ref. Range 3/15/2017 09:10   Protime Latest Ref Range: 11.7 - 14.2 Seconds 17.2 (H)   INR Latest " Ref Range: 0.90 - 1.10  1.46 (H)     CRANIAL CT WITHOUT CONTRAST AT 4:13 AM    No change in bifrontal acute SDH. CT is stable.       Assessment/Plan     Active Problems:    Acute posthemorrhagic anemia      Assessment & Plan     Traumatic L>R acute subdural hematomas  Hx of fall one month ago  Hx of AFib - off coumadin  Encephalopathy - improving      OK for transfer to floor from FOZIA standpoint  OK to mobilize      Cass Saavedra, TARIQ  03/15/17  12:23 PM

## 2017-03-15 NOTE — PROGRESS NOTES
Acute Care - Physical Therapy Treatment Note  [facility]     Patient Name: Aime Hernandez  : 1945  MRN: [id number]  Today's Date: 3/15/2017  Onset of Illness/Injury or Date of Surgery Date: 17     Referring Physician: Alicia    Admit Date: 3/12/2017    Visit Dx:    ICD-10-CM ICD-9-CM   1. Acute posthemorrhagic anemia D62 285.1   2. Warfarin-induced coagulopathy (severe) T45.511A 286.9    D68.9 E934.2   3. Occult GI bleeding R19.5 792.1   4. Elevated troponin R79.89 790.6   5. Bruising and hematoma to left chest and left abdomen T14.8 924.9   6. Chronic atrial fibrillation I48.2 427.31   7. Toxic metabolic encephalopathy G92 349.82   8. Hypomagnesemia E83.42 275.2   9. Contusion of left upper arm, initial encounter S40.022A 923.03   10. Subdural hematoma I62.00 432.1     Patient Active Problem List   Diagnosis   • B12 deficiency   • Thrombocytopenia   • Carotid artery stenosis   • CAD (coronary artery disease)   • Chronic a-fib   • DM type 2 (diabetes mellitus, type 2)   • Acute posthemorrhagic anemia               Adult Rehabilitation Note       03/15/17 1155 17 1132       Rehab Assessment/Intervention    Discipline physical therapist  -CH physical therapist  -CH     Document Type therapy note (daily note)  -CH therapy note (daily note)  -CH     Subjective Information agree to therapy  -CH agree to therapy  -CH     Patient Effort, Rehab Treatment good  -CH good  -CH     Symptoms Noted During/After Treatment none  -CH none  -CH     Precautions/Limitations fall precautions   pt seems more confused today  -CH fall precautions  -CH     Recorded by [CH] Dorothy Stanford, PT [CH] Dorothy Stanford, PT     Pain Assessment    Pain Assessment No/denies pain  -CH No/denies pain  -CH     Recorded by [CH] Dorothy Stanford, PT [CH] Dorothy Stanford, PT     Cognitive Assessment/Intervention    Current Cognitive/Communication Assessment impaired  -CH impaired  -CH     Orientation Status oriented  to;person  -CH oriented to;person   pt fixated on calling his wife  -CH     Follows Commands/Answers Questions 75% of the time;able to follow single-step instructions;needs cueing;needs increased time;needs repetition  -CH 75% of the time;able to follow single-step instructions;needs cueing;needs increased time;needs repetition  -CH     Personal Safety mild impairment;impulsive  -CH mild impairment;impulsive  -CH     Personal Safety Interventions fall prevention program maintained;gait belt;nonskid shoes/slippers when out of bed  - fall prevention program maintained;gait belt;nonskid shoes/slippers when out of bed  -CH     Recorded by [CH] Dorothy Stanford, PT [CH] Dorothy Stanford, PT     Bed Mobility, Assessment/Treatment    Bed Mob, Supine to Sit, [place] verbal cues required;nonverbal cues required (demo/gesture);moderate assist (50% patient effort)  -CH verbal cues required;nonverbal cues required (demo/gesture);contact guard assist;2 person assist required  -     Bed Mob, Sit to Supine, [place] verbal cues required;nonverbal cues required (demo/gesture);minimum assist (75% patient effort)  - verbal cues required;nonverbal cues required (demo/gesture);contact guard assist;2 person assist required  -     Bed Mobility, Comment pt seemd to have impaired motor planning when trying to get out of bed tihs AM thus required more assistance  -      Recorded by [CH] Dorothy Stanford, PT [CH] Dorothy Stanford, PT     Transfer Assessment/Treatment    Transfers, Sit-Stand [place] verbal cues required;nonverbal cues required (demo/gesture);contact guard assist  - verbal cues required;nonverbal cues required (demo/gesture);contact guard assist;2 person assist required  -     Transfers, Stand-Sit [place] verbal cues required;nonverbal cues required (demo/gesture);contact guard assist  -CH verbal cues required;nonverbal cues required (demo/gesture);contact guard assist;2 person assist  required  -CH     Transfers, Sit-Stand-Sit, Assist Device rolling walker  -CH rolling walker  -CH     Recorded by [CH] Dorothy Stanford, PT [CH] Dorothy Stanford PT     Gait Assessment/Treatment    Gait, [place] Level verbal cues required;nonverbal cues required (demo/gesture);minimum assist (75% patient effort)  -CH verbal cues required;nonverbal cues required (demo/gesture);contact guard assist;2 person assist required  -CH     Gait, Assistive Device rolling walker  -CH rolling walker  -CH     Gait, Distance (Feet) 150  -  -CH     Gait, Gait Deviations tamika decreased;step length decreased;stride length decreased  - tamika decreased;forward flexed posture;step length decreased;stride length decreased  -     Gait, Safety Issues  step length decreased  -CH     Gait, Impairments  impaired balance  -     Gait, Comment pt required only CGA for most of ambulation. pt did have 1 LOB when he became distracted and required Sunny to correct balance  -CH      Recorded by [RUSSELL] Dorothy Stanford PT [CH] Dorothy Stanford PT     Positioning and Restraints    Pre-Treatment Position in bed  -CH in bed  -CH     Post Treatment Position bed  -CH bed  -CH     In Bed supine;call light within reach;encouraged to call for assist;with nsg   RN present and said she would finish getting pt settled  -CH supine;call light within reach;encouraged to call for assist  -CH     Recorded by [RUSSELL] Dorothy Stanford, PT [CH] Dorothy Stanford PT       User Key  (r) = Recorded By, (t) = Taken By, (c) = Cosigned By    Initials Name Effective Dates     Dorothy Stanford PT 12/01/15 -                 IP PT Goals       03/13/17 1502          Bed Mobility PT LTG    Bed Mobility PT LTG, Date Established 03/13/17  -AR      Bed Mobility PT LTG, Time to Achieve 1 wk  -AR      Bed Mobility PT LTG, Activity Type supine to sit/sit to supine  -AR      Bed Mobility PT LTG, [place] Level supervision required  -AR      Transfer  Training PT LTG    Transfer Training PT LTG, Date Established 03/13/17  -AR      Transfer Training PT LTG, Time to Achieve 1 wk  -AR      Transfer Training PT LTG, Activity Type sit to stand/stand to sit;bed to chair /chair to bed  -AR      Transfer Training PT LTG, [place] Level supervision required  -AR      Transfer Training PT LTG, Assist Device walker, rolling  -AR      Gait Training PT LTG    Gait Training Goal PT LTG, Date Established 03/13/17  -AR      Gait Training Goal PT LTG, Time to Achieve 1 wk  -AR      Gait Training Goal PT LTG, [place] Level supervision required  -AR      Gait Training Goal PT LTG, Assist Device walker, rolling  -AR      Gait Training Goal PT LTG, Distance to Achieve 150  -AR        User Key  (r) = Recorded By, (t) = Taken By, (c) = Cosigned By    Initials Name Provider Type    DES Mercado, PT Physical Therapist          Physical Therapy Education     Title: PT OT SLP Therapies (Active)     Topic: Physical Therapy (Done)     Point: Mobility training (Done)    Learning Progress Summary    Learner Readiness Method Response Comment Documented by Status   Patient Acceptance E,TB,D VU,NR   03/15/17 1210 Done    Acceptance E,TB,D VU,NR   03/14/17 1200 Done    Acceptance E VU  AR 03/13/17 1503 Done               Point: Home exercise program (Done)    Learning Progress Summary    Learner Readiness Method Response Comment Documented by Status   Patient Acceptance E VU  AR 03/13/17 1503 Done               Point: Body mechanics (Done)    Learning Progress Summary    Learner Readiness Method Response Comment Documented by Status   Patient Acceptance E,TB,D VU,NR   03/15/17 1210 Done    Acceptance E,TB,D VU,NR   03/14/17 1200 Done    Acceptance E VU  AR 03/13/17 1503 Done               Point: Precautions (Done)    Learning Progress Summary    Learner Readiness Method Response Comment Documented by Status   Patient Acceptance E,TB,D VU,NR   03/15/17 1210 Done     Acceptance E,TB,D VU,NR   03/14/17 1200 Done    Acceptance E VU  AR 03/13/17 1503 Done                      User Key     Initials Effective Dates Name Provider Type Discipline     12/01/15 -  Dorothy Stanford, PT Physical Therapist PT    AR 06/27/16 -  Lexi Mercado, PT Physical Therapist PT                    PT Recommendation and Plan  Anticipated Discharge Disposition: skilled nursing facility, home with assist, home with home health (pending progress/level of assist family can provide)  Planned Therapy Interventions: balance training, bed mobility training, gait training, home exercise program, patient/family education, ROM (Range of Motion), stair training, strengthening  PT Frequency: daily  Plan of Care Review  Plan Of Care Reviewed With: patient  Progress: improving  Outcome Summary/Follow up Plan: Pt required some increased assistance with bed mobility today secondary to some increased confusion and decreased motor planning. Pt also with 1 LOB while distracted which required increased assistance to correct.Pt will continue to follow to address strength, balance , and safety.          Outcome Measures       03/15/17 1200 03/14/17 1337 03/14/17 1200    How much help from another person do you currently need...    Turning from your back to your side while in flat bed without using bedrails? 3  -CH  3  -CH    Moving from lying on back to sitting on the side of a flat bed without bedrails? 2  -CH  3  -CH    Moving to and from a bed to a chair (including a wheelchair)? 3  -CH  3  -CH    Standing up from a chair using your arms (e.g., wheelchair, bedside chair)? 3  -CH  3  -CH    Climbing 3-5 steps with a railing? 2  -CH  2  -CH    To walk in hospital room? 3  -CH  3  -CH    AM-PAC 6 Clicks Score 16  -CH  17  -CH    How much help from another is currently needed...    Putting on and taking off regular lower body clothing?  3  -AF     Bathing (including washing, rinsing, and drying)  3  -AF     Toileting  (which includes using toilet bed pan or urinal)  3  -AF     Putting on and taking off regular upper body clothing  3  -AF     Taking care of personal grooming (such as brushing teeth)  3  -AF     Eating meals  3  -AF     Score  18  -AF     Functional Assessment    Outcome Measure Options AM-PAC 6 Clicks Basic Mobility (PT)  -CH AM-PAC 6 Clicks Daily Activity (OT)  -AF AM-PAC 6 Clicks Basic Mobility (PT)  -      03/13/17 1500          How much help from another person do you currently need...    Turning from your back to your side while in flat bed without using bedrails? 3  -AR      Moving from lying on back to sitting on the side of a flat bed without bedrails? 3  -AR      Moving to and from a bed to a chair (including a wheelchair)? 3  -AR      Standing up from a chair using your arms (e.g., wheelchair, bedside chair)? 3  -AR      Climbing 3-5 steps with a railing? 2  -AR      To walk in hospital room? 3  -AR      AM-PAC 6 Clicks Score 17  -AR      Functional Assessment    Outcome Measure Options AM-PAC 6 Clicks Basic Mobility (PT)  -AR        User Key  (r) = Recorded By, (t) = Taken By, (c) = Cosigned By    Initials Name Provider Type     Dorothy Stanford, PT Physical Therapist    AF Saritha Sherman, OTR Occupational Therapist    AR Lexi Mercado, [place] Physical Therapist           Time Calculation:         PT Charges       03/15/17 1214          Time Calculation    Start Time 1111  -      Stop Time 1120  -      Time Calculation (min) 9 min  -      PT Received On 03/15/17  -      PT - Next Appointment 03/16/17  -        User Key  (r) = Recorded By, (t) = Taken By, (c) = Cosigned By    Initials Name Provider Type     Dorothy Stanford, PT Physical Therapist          Therapy Charges for Today     Code Description Service Date Service Provider Modifiers Qty    69647935086 HC PT THER PROC EA 15 MIN 3/14/2017 Dorothy Stanford, PT GP 1    86281522312 HC PT THER SUPP EA 15 MIN 3/14/2017 Dorothy DIAMOND  Abdoulaye, PT GP 1    41996713703 HC PT THER PROC EA 15 MIN 3/15/2017 Dorothy Stanford, PT GP 1          PT G-Codes  Outcome Measure Options: AM-PAC 6 Clicks Basic Mobility (PT)    Dorothy Stanford, PT  3/15/2017

## 2017-03-15 NOTE — PROGRESS NOTES
"CC: CAD/AFib    Interval History:   No complaints    Vital Signs  Temp:  [97.9 °F (36.6 °C)-98.7 °F (37.1 °C)] 98.7 °F (37.1 °C)  Heart Rate:  [] 104  BP: (114-175)/(64-93) 114/64    Intake/Output Summary (Last 24 hours) at 03/15/17 0814  Last data filed at 03/15/17 0500   Gross per 24 hour   Intake    440 ml   Output   2600 ml   Net  -2160 ml     Flowsheet Rows         First Filed Value    Admission Height  70\" (177.8 cm) Documented at 03/12/2017 1243    Admission Weight  200 lb (90.7 kg) Documented at 03/12/2017 1243          PHYSICAL EXAM:  General: No acute distress  Resp:NL Rate, unlabored, clear  CV:NL rate and irregular rhythm, NL PMI, Nl S1 and S2, no Mumur, no gallop, no rub, No JVD. Normal pedal pulses  ABD:Nl sounds, no masses or tenderness, nondistended, no quarding or rebound  Neuro: alert,cooperative and not oriented to place and he knew the date and who I was  Extr: No edema or cyanosis, moves all extremities      Results Review:      Results from last 7 days  Lab Units 03/14/17  0401   SODIUM mmol/L 138   POTASSIUM mmol/L 3.7   CHLORIDE mmol/L 105   TOTAL CO2 mmol/L 21.7*   BUN mg/dL 17   CREATININE mg/dL 0.68*   GLUCOSE mg/dL 164*   CALCIUM mg/dL 8.1*       Results from last 7 days  Lab Units 03/13/17  0451 03/12/17  1304   TROPONIN T ng/mL 0.112* 0.145*       Results from last 7 days  Lab Units 03/14/17  0401   WBC 10*3/mm3 9.14   HEMOGLOBIN g/dL 8.6*   HEMATOCRIT % 25.0*   PLATELETS 10*3/mm3 171       Results from last 7 days  Lab Units 03/14/17  0401 03/13/17  1454 03/13/17  0451   INR  1.18* 1.11* 1.13*           Results from last 7 days  Lab Units 03/13/17  1807   MAGNESIUM mg/dL 1.8         @LABRCNT(bnp)@  I reviewed the patient's new clinical results.  I personally viewed and interpreted the patient's EKG/Telemetry data        Medication Review:   Meds reviewed         Assessment/Plan  1. Subdural hematoma, recent fall in February of this year INR elevated on admission Reinstitute " warfarin at some time.  Repeat CT last night unchanged. Still confused but stable  2. Warfarin toxicity as above now reversed holding warfarin  3. Elevated troponin: Doubt acute myocardial infarction, may be stress induced  4. History of severe coronary disease status post  coronary artery bypass grafting, mild left ventricular systolic dysfunction, left ventricular ejection fraction of 45%. 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 Perfusion stress test June 2016 no ischemia echocardiogram February 2017 normal left ventricular ejection fraction with concentric hypertrophy.  5. Chronic atrial fibrillation: Was on warfarin therapy.  INR elevated on admission Stopped at present.  Heart rate still increased we'll increase beta blocker  6. Anemia: Receive transfusion continue to follow.   7. Diabetes mellitus         Sea Sherwood MD  03/15/17  8:14 AM

## 2017-03-15 NOTE — PROGRESS NOTES
[facility]      Mar/chart reviewed  F/u ICH and acute blood loss anemia.  Became agitated yesterday, requiring restraints. More calm this morning, wants to get out of restraints.     Vital Sign Min/Max for last 24 hours  Temp  Min: 97.5 °F (36.4 °C)  Max: 98.7 °F (37.1 °C)   BP  Min: 114/64  Max: 175/92   Pulse  Min: 85  Max: 133   No Data Recorded   SpO2  Min: 93 %  Max: 100 %   No Data Recorded   No Data Recorded     Nad, axox2,   perrl, eomi, no icterus,  mmm, no jvd, trachea midline, neck supple,  chest cta bilaterally, no crackles, no wheezes,   rrr,   soft, nt, nd +bs,  no c/c/ e     Labs:  Wbc 9.14  hgb 8.6  plts 171  Cr 0.68  Bicarb 21.7    Ct head: unchanged    A/P:  1. Acute subdural hematoma -- corrected coagulopathy, Systolic <160  2. Multiple hematomas -- axillary, si joing, iliopsoas -- corrected coagulopathy  3. Acute blood loss anemia -- monitor transfuse as needed, I think his hematomas explain it; he did have guiac positive stool in er but I don't think this is the major source of blood loss  4. Elevated troponin -- will have cardiology to see  5. coagulopathy -- given kcentra and vit k in er per protocol, most recent check, inr is ok.  6. Dm/hyperglycemia -- ssi as per icu protocol  7. afib -- rate controlled, continue to hold anticoagulation  8. Toxic metabolic encephalopathy- suspect encephalopathy 2/2 #1; likely some dementia with icu psychosis as well.   9. bp control -- keep systolic less than 160    Will need placement, will have to get him out of restraints for that.   Will try a little seroquel.

## 2017-03-15 NOTE — PLAN OF CARE
Problem: Fall Risk (Adult)  Goal: Absence of Falls  Outcome: Ongoing (interventions implemented as appropriate)    Problem: Confusion, Acute (Adult)  Goal: Cognitive/Functional Impairments Minimized  Outcome: Ongoing (interventions implemented as appropriate)  Goal: Safety  Outcome: Ongoing (interventions implemented as appropriate)    Problem: Stroke (Hemorrhagic) (Adult)  Goal: Signs and Symptoms of Listed Potential Problems Will be Absent or Manageable (Stroke)  Outcome: Ongoing (interventions implemented as appropriate)    Problem: Diabetes, Type 2 (Adult)  Goal: Signs and Symptoms of Listed Potential Problems Will be Absent or Manageable (Diabetes, Type 2)  Outcome: Ongoing (interventions implemented as appropriate)    Problem: Patient Care Overview (Adult)  Goal: Plan of Care Review  Outcome: Ongoing (interventions implemented as appropriate)    03/15/17 0612   Coping/Psychosocial Response Interventions   Plan Of Care Reviewed With patient   Patient Care Overview   Progress no change   Outcome Evaluation   Outcome Summary/Follow up Plan Patient agitated and restless throughout the night, Haldol and Valium given per MAR. HR 90-120s Lopressor given. Had repeat head CT this AM.          Problem: SAFETY - NON-VIOLENT RESTRAINT  Goal: Remains free of injury from restraints (Non-Violent Restraint)  Outcome: Ongoing (interventions implemented as appropriate)  Goal: Free from restraint(s) (Non-Violent Restraint)  Outcome: Ongoing (interventions implemented as appropriate)

## 2017-03-15 NOTE — PLAN OF CARE
Problem: Patient Care Overview (Adult)  Goal: Plan of Care Review  Outcome: Ongoing (interventions implemented as appropriate)    03/15/17 1211   Coping/Psychosocial Response Interventions   Plan Of Care Reviewed With patient   Outcome Evaluation   Outcome Summary/Follow up Plan Pt required some increased assistance with bed mobility today secondary to some increased confusion and decreased motor planning. Pt also with 1 LOB while distracted which required increased assistance to correct.Pt will continue to follow to address strength, balance , and safety.

## 2017-03-16 NOTE — PROGRESS NOTES
Continued Stay Note  [facility]     Patient Name: Aime Hernandez  MRN: [id number]  Today's Date: 3/16/2017    Admit Date: 3/12/2017          Discharge Plan       03/16/17 1603    Case Management/Social Work Plan    Plan Referral to [facility] and [facility]    Patient/Family In Agreement With Plan yes    Additional Comments Recieved inbound call from Flor pts daughter and discussed with her ccp consult from md regarding Rehab placement. CCP listed facilities off [place] [id number] [facility], [facility], [facility], [facility], [facility] and [facility]. Pts daughter requested CCP to make referral to [facility] as first choice and backup choice is [facility]. CCP contacted Pamela with [facility] and left message for Pamela of referral. CCP also called Geovani with [facility] that they are backup facility. Transfer packet in CCP office. [facility]      03/16/17 1531    Case Management/Social Work Plan    Plan Plan undetermined-    Patient/Family In Agreement With Plan yes    Additional Comments Spoke with patients wife Krystal Hernandez ([phone]) via outbound call, introduced self and explained CCP role. Verified facesheet and confirmed pharmacy. Pts wife stated pt was independent at home with his cane, and was not driving prior to admission. Krystal states her daughter Flor Hernandez ([phone]) lives with them at their home and assists in their care. Krystal states pt has not used HH before and has been to rehab but unsure what name was. CCP reviewed progress and PT Notes with wife and explained pt will most likely need skilled rehab. CCP explained facilities near their home from medic[place] website of [place] [id number]. Pts wife requested CCP to speak with her daughter Flor. CCP called Flor and left vm requesting call back. No referrals made at this time. [facility]              Discharge Codes      None            Karo Whelan RN

## 2017-03-16 NOTE — PROGRESS NOTES
"CC: CAD/Afib    Interval History:   No complaints of little lethargic still not oriented to place.    Vital Signs  Temp:  [97.5 °F (36.4 °C)-99.2 °F (37.3 °C)] 99.2 °F (37.3 °C)  Heart Rate:  [] 96  Resp:  [18-27] 18  BP: (114-151)/(64-94) 151/81    Intake/Output Summary (Last 24 hours) at 03/16/17 0533  Last data filed at 03/15/17 1800   Gross per 24 hour   Intake   1080 ml   Output   1200 ml   Net   -120 ml     Flowsheet Rows         First Filed Value    Admission Height  70\" (177.8 cm) Documented at 03/12/2017 1243    Admission Weight  200 lb (90.7 kg) Documented at 03/12/2017 1243          PHYSICAL EXAM:  General: No acute distress  Resp:NL Rate, unlabored, clear  CV:NL rate and irregular rhythm, NL PMI, Nl S1 and S2, no Mumur, no gallop, no rub, No JVD. Normal pedal pulses  ABD:Nl sounds, no masses or tenderness, nondistended, no quarding or rebound  Neuro: alert,cooperative and not oriented to place and he knew the date and who I was  Extr: No edema or cyanosis, moves all extremities      Results Review:      Results from last 7 days  Lab Units 03/16/17  0308   SODIUM mmol/L 140   POTASSIUM mmol/L 3.9   CHLORIDE mmol/L 103   TOTAL CO2 mmol/L 22.5   BUN mg/dL 13   CREATININE mg/dL 0.65*   GLUCOSE mg/dL 188*   CALCIUM mg/dL 8.5*       Results from last 7 days  Lab Units 03/13/17  0451 03/12/17  1304   TROPONIN T ng/mL 0.112* 0.145*       Results from last 7 days  Lab Units 03/15/17  0910   WBC 10*3/mm3 9.68   HEMOGLOBIN g/dL 9.0*   HEMATOCRIT % 25.9*   PLATELETS 10*3/mm3 177       Results from last 7 days  Lab Units 03/16/17  0308 03/15/17  0910 03/14/17  0401   INR  1.48* 1.46* 1.18*           Results from last 7 days  Lab Units 03/13/17  1807   MAGNESIUM mg/dL 1.8         @LABRCNT(bnp)@  I reviewed the patient's new clinical results.  I personally viewed and interpreted the patient's EKG/Telemetry data        Medication Review:   Meds reviewed         Assessment/Plan  1. Subdural hematoma, recent fall in " February of this year INR elevated on admission Reinstitute warfarin at some time. Repeat CT last night unchanged. Still confused some but stable  2. Warfarin toxicity as above now reversed holding warfarin  3. Elevated troponin: Doubt acute myocardial infarction, may be stress induced  4. History of severe coronary disease status post  coronary artery bypass grafting, mild left ventricular systolic dysfunction, left ventricular ejection fraction of 45%. 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 Perfusion stress test June 2016 no ischemia echocardiogram February 2017 normal left ventricular ejection fraction with concentric hypertrophy.  5. Chronic atrial fibrillation: Was on warfarin therapy.  INR elevated on admission Stopped at present.  Heart rate still increased we'll increase beta blocker  6. Anemia: Receive transfusion continue to follow.   7. Diabetes mellitus       Sea Sherwood MD  03/16/17  5:33 AM

## 2017-03-16 NOTE — PROGRESS NOTES
Acute Care - Occupational Therapy Treatment Note  [facility]     Patient Name: Aime Hernandez  : 1945  MRN: [id number]  Today's Date: 3/16/2017  Onset of Illness/Injury or Date of Surgery Date: 17  Date of Referral to OT: 17  Referring Physician: Alicia      Admit Date: 3/12/2017    Visit Dx:     ICD-10-CM ICD-9-CM   1. Acute posthemorrhagic anemia D62 285.1   2. Warfarin-induced coagulopathy (severe) T45.511A 286.9    D68.9 E934.2   3. Occult GI bleeding R19.5 792.1   4. Elevated troponin R79.89 790.6   5. Bruising and hematoma to left chest and left abdomen T14.8 924.9   6. Chronic atrial fibrillation I48.2 427.31   7. Toxic metabolic encephalopathy G92 349.82   8. Hypomagnesemia E83.42 275.2   9. Contusion of left upper arm, initial encounter S40.022A 923.03   10. Subdural hematoma I62.00 432.1     Patient Active Problem List   Diagnosis   • B12 deficiency   • Thrombocytopenia   • Carotid artery stenosis   • CAD (coronary artery disease)   • Chronic a-fib   • DM type 2 (diabetes mellitus, type 2)   • Acute posthemorrhagic anemia             Adult Rehabilitation Note       17 1227 17 1152 03/15/17 1356    Rehab Assessment/Intervention    Discipline occupational therapist  -AF physical therapist  -MQ occupational therapist  -AF    Document Type therapy note (daily note)  -AF therapy note (daily note)  -MQ therapy note (daily note)  -AF    Subjective Information no complaints;agree to therapy  -AF no complaints;agree to therapy   Pt more alert than he was this AM, per RN report.  -MQ agree to therapy;no complaints  -AF    Patient Effort, Rehab Treatment good  -AF good  -MQ good  -AF    Patient Effort, Rehab Treatment Comment pt. had a loose bowel movement when standing at EOB washing up, pt was unaware that he needed to use the bathroom. pt was lethargic and had a hard time keeping his eyes open during thsi treatment   -AF  pt was more lethargic and confusion today during  treatmnet session, kept eyes closed  -AF    Symptoms Noted During/After Treatment  fatigue  -MQ     Precautions/Limitations fall precautions  -AF fall precautions  -MQ fall precautions  -AF    Recorded by [AF] Saritha Sherman, OTR [MQ] Pamela Monterroso, PT [AF] Saritha Sherman, OTR    Vital Signs    Pretreatment Heart Rate (beats/min)  94  -MQ     Intratreatment Heart Rate (beats/min)  117  -MQ     Posttreatment Heart Rate (beats/min)  96  -MQ     Recorded by  [MQ] Pamela Monterroso, PT     Pain Assessment    Pain Assessment No/denies pain  -AF No/denies pain  -MQ No/denies pain  -AF    Recorded by [AF] Saritha Sherman OTR [MQ] Pamela Monterroso, PT [AF] Saritha Sherman, JENNIFERR    Vision Assessment/Intervention    Visual Impairment Comment   kept eyes closed, vc's to open to assist with standing balance  -AF    Recorded by   [AF] Saritha Sherman OTR    Cognitive Assessment/Intervention    Current Cognitive/Communication Assessment impaired  -AF impaired  -MQ impaired  -AF    Orientation Status oriented to;person   pt didn't know where he was this AM  -AF oriented to;person;place  -MQ oriented to;person  -AF    Follows Commands/Answers Questions 75% of the time;able to follow single-step instructions;needs cueing;needs increased time;needs repetition  -AF able to follow single-step instructions;75% of the time;needs cueing  -MQ 50% of the time;75% of the time;able to follow single-step instructions;needs cueing;needs increased time;needs repetition  -AF    Personal Safety mild impairment;decreased awareness, need for safety;impulsive  -AF mild impairment;decreased awareness, need for safety;impulsive  -MQ mild impairment;impulsive  -AF    Personal Safety Interventions fall prevention program maintained;nonskid shoes/slippers when out of bed;gait belt  -AF fall prevention program maintained;gait belt;nonskid shoes/slippers when out of bed;supervised activity  -MQ fall prevention program maintained;gait belt;nonskid  shoes/slippers when out of bed  -AF    Recorded by [AF] Saritha Sherman OTR [MQ] Pamela Monterroso, PT [AF] Saritha Sherman OTR    Bed Mobility, Assessment/Treatment    Bed Mobility, Assistive Device  bed rails;head of bed elevated  -MQ     Bed Mob, Supine to Sit, [place] minimum assist (75% patient effort);verbal cues required  -AF contact guard assist;verbal cues required  -MQ moderate assist (50% patient effort);verbal cues required  -AF    Bed Mob, Sit to Supine, [place] minimum assist (75% patient effort);verbal cues required  -AF contact guard assist;verbal cues required  -MQ minimum assist (75% patient effort);verbal cues required;nonverbal cues required (demo/gesture)  -AF    Bed Mobility, Safety Issues  decreased use of arms for pushing/pulling;decreased use of legs for bridging/pushing  -     Bed Mobility, Impairments  strength decreased  -     Recorded by [AF] Saritha Sherman OTR [MQ] Pamela Monterroso, PT [AF] Saritha Sherman OTR    Transfer Assessment/Treatment    Transfers, Bed-Chair [place] minimum assist (75% patient effort);verbal cues required  -AF      Transfers, Chair-Bed [place] minimum assist (75% patient effort);verbal cues required  -AF      Transfers, Sit-Stand [place] minimum assist (75% patient effort);verbal cues required  -AF contact guard assist;verbal cues required;nonverbal cues required (demo/gesture)  - contact guard assist;nonverbal cues required (demo/gesture);verbal cues required  -AF    Transfers, Stand-Sit [place] minimum assist (75% patient effort);verbal cues required  -AF contact guard assist;verbal cues required;nonverbal cues required (demo/gesture)  - verbal cues required;nonverbal cues required (demo/gesture);contact guard assist  -AF    Transfers, Sit-Stand-Sit, Assist Device  rolling walker  -MQ     Transfer, Safety Issues  balance decreased during turns;step length decreased;impulsivity  -     Transfer, Impairments  strength  decreased;impaired balance  -MQ     Recorded by [AF] PAULA Daily [MQ] Pamela Monterroso, PT [AF] Saritha Sherman OTR    Gait Assessment/Treatment    Gait, Bent Level  contact guard assist;verbal cues required  -MQ     Gait, Assistive Device  rolling walker  -MQ     Gait, Distance (Feet)  150  -MQ     Gait, Gait Deviations  tamika decreased;forward flexed posture;bilateral:;decreased heel strike;limb motion velocity decreased;step length decreased;toe-to-floor clearance decreased;other (see comments)   B knees flexed  -MQ     Gait, Safety Issues  balance decreased during turns;step length decreased  -MQ     Gait, Impairments  strength decreased;impaired balance  -MQ     Recorded by  [MQ] Pamela Monterroso, PT     Upper Body Bathing Assessment/Training    UB Bathing Assess/Train, Position sitting;edge of bed  -AF      UB Bathing Assess/Train, Bent Level supervision required;verbal cues required;contact guard assist  -AF      Recorded by [AF] PAULA Daily      Lower Body Bathing Assessment/Training    LB Bathing Assess/Train, Position standing;sitting;edge of bed  -AF      LB Bathing Assess/Train, Bent Level minimum assist (75% patient effort);contact guard assist;verbal cues required  -AF      LB Bathing Assess/Train, Impairments impaired balance  -AF      LB Bathing Assess/Train, Comment   pt stood at side of bed to wash bottom with CGA for balance  -AF    Recorded by [AF] PAULA Daily  [AF] Saritha Sherman OTR    Upper Body Dressing Assessment/Training    UB Dressing Assess/Train, Clothing Type doffing:;donning:;hospital gown  -AF      UB Dressing Assess/Train, Position sitting;edge of bed  -AF      UB Dressing Assess/Train, Bent verbal cues required;minimum assist (75% patient effort)  -AF      Recorded by [AF] PAULA Daily      Toileting Assessment/Training    Toileting Assess/Train, Comment dependent to clean up loose bowel mvt  -AF      Recorded  by [AF] PAULA Daily      Grooming Assessment/Training    Grooming Assess/Train, Position sitting;edge of bed  -AF      Grooming Assess/Train, Indepen Level verbal cues required;supervision required  -AF      Grooming Assess/Train, Comment   pt stood at side of bed to wash face with CGA  -AF    Recorded by [AF] PAULA Daily  [AF] PAULA Daily    Balance Skills Training    Standing-Level of Assistance Contact guard;Minimum assistance  -AF  Minimum assistance;Contact guard  -AF    Static Standing Balance Support No upper extremity supported  -AF  No upper extremity supported  -AF    Standing-Balance Activities --   ADls  -AF  --   ADL  -AF    Standing Balance # of Minutes 3-4 mins  -AF  2-3 mins  -AF    Recorded by [AF] PAULA Daily  [AF] PAULA Daily    Therapy Exercises    Bilateral Upper Extremity   5 reps;AAROM:;AROM:;sitting;elbow flexion/extension;shoulder extension/flexion;shoulder ER/IR  -AF    Recorded by   [AF] PAULA Daily    Functional Endurance    Detail (Functional Endurance) fair with ADL's, decreased endurance secodnary to fatigue  -AF  decreased endurance noted today unable to stand as long during ADL tasks and required 2 rest breaks   -AF    Recorded by [AF] PAULA Daily  [AF] PAULA Daily    Positioning and Restraints    Pre-Treatment Position in bed  -AF in bed  -MQ in bed  -AF    Post Treatment Position bed  -AF bed  -MQ bed  -AF    In Bed supine;call light within reach;encouraged to call for assist;exit alarm on;with nsg   with CNA staff  -AF supine;call light within reach;encouraged to call for assist;exit alarm on  -MQ supine;notified nsg;call light within reach;encouraged to call for assist;exit alarm on  -AF    Recorded by [AF] PAULA Daily [MQ] Pamela Monterroso, PT [AF] PAULA Daily      03/15/17 1155 03/14/17 1132       Rehab Assessment/Intervention    Discipline physical therapist  -CH physical  therapist  -CH     Document Type therapy note (daily note)  -CH therapy note (daily note)  -     Subjective Information agree to therapy  -CH agree to therapy  -CH     Patient Effort, Rehab Treatment good  -CH good  -     Symptoms Noted During/After Treatment none  -CH none  -CH     Precautions/Limitations fall precautions   pt seems more confused today  -CH fall precautions  -CH     Recorded by [CH] Dorothy Stanford, PT [CH] Dorothy Stanford PT     Pain Assessment    Pain Assessment No/denies pain  -CH No/denies pain  -CH     Recorded by [CH] Dorothy Stanford PT [CH] Dorothy Stanford PT     Cognitive Assessment/Intervention    Current Cognitive/Communication Assessment impaired  -CH impaired  -     Orientation Status oriented to;person  -CH oriented to;person   pt fixated on calling his wife  -CH     Follows Commands/Answers Questions 75% of the time;able to follow single-step instructions;needs cueing;needs increased time;needs repetition  -CH 75% of the time;able to follow single-step instructions;needs cueing;needs increased time;needs repetition  -CH     Personal Safety mild impairment;impulsive  -CH mild impairment;impulsive  -CH     Personal Safety Interventions fall prevention program maintained;gait belt;nonskid shoes/slippers when out of bed  - fall prevention program maintained;gait belt;nonskid shoes/slippers when out of bed  -CH     Recorded by [CH] Dorothy Stanford PT [CH] Dorothy Stanford PT     Bed Mobility, Assessment/Treatment    Bed Mob, Supine to Sit, Interior verbal cues required;nonverbal cues required (demo/gesture);moderate assist (50% patient effort)  - verbal cues required;nonverbal cues required (demo/gesture);contact guard assist;2 person assist required  -     Bed Mob, Sit to Supine, Interior verbal cues required;nonverbal cues required (demo/gesture);minimum assist (75% patient effort)  - verbal cues required;nonverbal cues required  (demo/gesture);contact guard assist;2 person assist required  -CH     Bed Mobility, Comment pt seemd to have impaired motor planning when trying to get out of bed tihs AM thus required more assistance  -CH      Recorded by [CH] Dorothy Stanford PT [CH] Dorothy Stanford PT     Transfer Assessment/Treatment    Transfers, Sit-Stand [place] verbal cues required;nonverbal cues required (demo/gesture);contact guard assist  -CH verbal cues required;nonverbal cues required (demo/gesture);contact guard assist;2 person assist required  -CH     Transfers, Stand-Sit [place] verbal cues required;nonverbal cues required (demo/gesture);contact guard assist  -CH verbal cues required;nonverbal cues required (demo/gesture);contact guard assist;2 person assist required  -CH     Transfers, Sit-Stand-Sit, Assist Device rolling walker  -CH rolling walker  -CH     Recorded by [CH] Dorothy Stanford PT [CH] Dorothy Stanford PT     Gait Assessment/Treatment    Gait, [place] Level verbal cues required;nonverbal cues required (demo/gesture);minimum assist (75% patient effort)  -CH verbal cues required;nonverbal cues required (demo/gesture);contact guard assist;2 person assist required  -CH     Gait, Assistive Device rolling walker  -CH rolling walker  -CH     Gait, Distance (Feet) 150  -  -CH     Gait, Gait Deviations tamika decreased;step length decreased;stride length decreased  - tamika decreased;forward flexed posture;step length decreased;stride length decreased  -CH     Gait, Safety Issues  step length decreased  -CH     Gait, Impairments  impaired balance  -     Gait, Comment pt required only CGA for most of ambulation. pt did have 1 LOB when he became distracted and required Sunny to correct balance  -CH      Recorded by [CH] Dorothy Stanford PT [CH] Dorothy Stanford PT     Positioning and Restraints    Pre-Treatment Position in bed  -CH in bed  -CH     Post Treatment Position bed  - bed  -      In Bed supine;call light within reach;encouraged to call for assist;with nsg   RN present and said she would finish getting pt settled  -CH supine;call light within reach;encouraged to call for assist  -CH     Recorded by [CH] Dorothy Stanford, PT [CH] Dorothy Stanford, PT       User Key  (r) = Recorded By, (t) = Taken By, (c) = Cosigned By    Initials Name Effective Dates    CH Dorothy Stanford, PT 12/01/15 -     AF Saritha Sherman, OTR 12/01/15 -     MQ Pamela Monterroso, PT 12/11/15 -                 OT Goals       03/14/17 1337          Transfer Training OT LTG    Transfer Training OT LTG, Date Established 03/14/17  -AF      Transfer Training OT LTG, Time to Achieve by discharge  -AF      Transfer Training OT LTG, Activity Type toilet  -AF      Transfer Training OT LTG, [place] Level supervision required;verbal cues required  -AF      Transfer Training OT LTG, Outcome goal ongoing  -AF      Patient Education OT LTG    Patient Education OT LTG, Date Established 03/14/17  -AF      Patient Education OT LTG, Time to Achieve by discharge  -AF      Patient Education OT LTG, Additional Goal safety with ADL's  -AF      Patient Education OT LTG Outcome goal ongoing  -AF      ADL OT LTG    ADL OT LTG, Date Established 03/14/17  -AF      ADL OT LTG, Time to Achieve by discharge  -AF      ADL OT LTG, Activity Type ADL skills  -AF      ADL OT LTG, [place] Level contact guard;standby assist  -AF      ADL OT LTG, Outcome goal ongoing  -AF        User Key  (r) = Recorded By, (t) = Taken By, (c) = Cosigned By    Initials Name Provider Type    AF Saritha Sherman, OTR Occupational Therapist          Occupational Therapy Education     Title: PT OT SLP Therapies (Active)     Topic: Occupational Therapy (Active)     Point: ADL training (Active)    Description: Instruct learner(s) on proper safety adaptation and remediation techniques during self care or transfers.   Instruct in proper use of assistive devices.    Learning  Progress Summary    Learner Readiness Method Response Comment Documented by Status   Patient Acceptance E NR safety with ADLs and transfers AF 03/14/17 1336 Active                      User Key     Initials Effective Dates Name Provider Type Discipline    AF 12/01/15 -  Saritha Sherman OTR Occupational Therapist OT                  OT Recommendation and Plan  Anticipated Equipment Needs At Discharge: bathing equipment  Planned Therapy Interventions: activity intolerance, ADL retraining, home exercise program, transfer training (safety awareness)  Therapy Frequency: 3-5 times/wk           Outcome Measures       03/15/17 1400 03/15/17 1200 03/14/17 1337    How much help from another person do you currently need...    Turning from your back to your side while in flat bed without using bedrails?  3  -CH     Moving from lying on back to sitting on the side of a flat bed without bedrails?  2  -CH     Moving to and from a bed to a chair (including a wheelchair)?  3  -CH     Standing up from a chair using your arms (e.g., wheelchair, bedside chair)?  3  -CH     Climbing 3-5 steps with a railing?  2  -CH     To walk in hospital room?  3  -CH     AM-PAC 6 Clicks Score  16  -CH     How much help from another is currently needed...    Putting on and taking off regular lower body clothing? 2  -AF  3  -AF    Bathing (including washing, rinsing, and drying) 3  -AF  3  -AF    Toileting (which includes using toilet bed pan or urinal) 3  -AF  3  -AF    Putting on and taking off regular upper body clothing 3  -AF  3  -AF    Taking care of personal grooming (such as brushing teeth) 3  -AF  3  -AF    Eating meals 2  -AF  3  -AF    Score 16  -AF  18  -AF    Functional Assessment    Outcome Measure Options  AM-PAC 6 Clicks Basic Mobility (PT)  -CH AM-PAC 6 Clicks Daily Activity (OT)  -AF      03/14/17 1200 03/13/17 1500       How much help from another person do you currently need...    Turning from your back to your side while in flat  bed without using bedrails? 3  -CH 3  -AR     Moving from lying on back to sitting on the side of a flat bed without bedrails? 3  -CH 3  -AR     Moving to and from a bed to a chair (including a wheelchair)? 3  -CH 3  -AR     Standing up from a chair using your arms (e.g., wheelchair, bedside chair)? 3  -CH 3  -AR     Climbing 3-5 steps with a railing? 2  -CH 2  -AR     To walk in hospital room? 3  -CH 3  -AR     AM-PAC 6 Clicks Score 17  -CH 17  -AR     Functional Assessment    Outcome Measure Options AM-PAC 6 Clicks Basic Mobility (PT)  -CH AM-PAC 6 Clicks Basic Mobility (PT)  -AR       User Key  (r) = Recorded By, (t) = Taken By, (c) = Cosigned By    Initials Name Provider Type     Dorothy Stanford, PT Physical Therapist    AF Saritha Sherman, OTR Occupational Therapist    AR Lexi Mercado, PT Physical Therapist           Time Calculation:         Time Calculation- OT       03/16/17 1233          Time Calculation- OT    OT Start Time 0949  -AF      OT Stop Time 1020  -AF      OT Time Calculation (min) 31 min  -AF        User Key  (r) = Recorded By, (t) = Taken By, (c) = Cosigned By    Initials Name Provider Type     PAULA Daily Occupational Therapist           Therapy Charges for Today     Code Description Service Date Service Provider Modifiers Qty    96129341288 HC OT SELF CARE/MGMT/TRAIN EA 15 MIN 3/15/2017 PAULA Daily GO 1    96669651705 HC OT SELF CARE/MGMT/TRAIN EA 15 MIN 3/16/2017 PAULA Daily GO 2               PAULA Daily  3/16/2017

## 2017-03-16 NOTE — PROGRESS NOTES
Discharge Planning Assessment  [facility]     Patient Name: Aime Hernandez  MRN: [id number]  Today's Date: 3/16/2017    Admit Date: 3/12/2017          Discharge Needs Assessment       03/16/17 7002    Living Environment    Lives With spouse;child(toni), adult    Living Arrangements house    Provides Primary Care For no one, unable/limited ability to care for self    Primary Care Provided By spouse/significant other;child(toni) (specify)    Quality Of Family Relationships supportive    Able to Return to Prior Living Arrangements other (see comments)    Discharge Needs Assessment    Concerns To Be Addressed discharge planning concerns    Concerns Comments REHAB PLACEMENT    Anticipated Changes Related to Illness inability to care for self    Equipment Currently Used at Home cane, straight    Equipment Needed After Discharge other (see comments)    Discharge Facility/Level Of Care Needs nursing facility, skilled            Discharge Plan       03/16/17 1701    Case Management/Social Work Plan    Plan Plan undetermined-    Patient/Family In Agreement With Plan yes    Additional Comments Spoke with patients wife Krystal Hernandez ([phone]) via outbound call, introduced self and explained CCP role. Verified facesheet and confirmed pharmacy. Pts wife stated pt was independent at home with his cane, and was not driving prior to admission. Krystal states her daughter Flor Hernandez ([phone]) lives with them at their home and assists in their care. Krystal states pt has not used HH before and has been to rehab but unsure what name was. CCP reviewed progress and PT Notes with wife and explained pt will most likely need skilled rehab. CCP explained facilities near their home from medicare[place] [address]. Pts wife requested CCP to speak with her daughter Flor. CCP called Flor and left vm requesting call back. No referrals made at this time. [facility] RN/CCP        Discharge Placement     No information  found                Demographic Summary       03/16/17 1551    Referral Information    Admission Type inpatient    Arrived From admitted as an inpatient    Referral Source admission list    Reason For Consult discharge planning    Record Reviewed history and physical;medical record    Contact Information    Permission Granted to Share Information With family/designee    Primary Care Physician Information    Name Dr. Levi Peterson             Functional Status       03/16/17 1552    Functional Status Current    Ambulation 3-->assistive equipment and person    Transferring 3-->assistive equipment and person    Toileting 3-->assistive equipment and person    Bathing 2-->assistive person    Dressing 2-->assistive person    Communication 2-->difficulty understanding (not related to language barrier)    Change in Functional Status Since Onset of Current Illness/Injury yes    Functional Status Prior    Ambulation 1-->assistive equipment    Transferring 1-->assistive equipment    Toileting 0-->independent    Bathing 0-->independent    Dressing 0-->independent    Eating 0-->independent    Communication 0-->understands/communicates without difficulty    Swallowing 0-->swallows foods/liquids without difficulty    IADL    Medications assistive person    Meal Preparation assistive person    Housekeeping assistive person    Laundry assistive person    Shopping assistive person    Activity Tolerance    Usual Activity Tolerance good    Current Activity Tolerance fair    Cognitive/Perceptual/Developmental    Current Mental Status/Cognitive Functioning not oriented to place;not oriented to time            Psychosocial     None            Abuse/Neglect     None            Legal     None            Substance Abuse     None            Patient Forms       03/16/17 1556    Patient Forms    Provider Choice List Delivered    Delivered to Support person    Method of delivery Telephone          Karo Whelan RN

## 2017-03-16 NOTE — PROGRESS NOTES
Acute Care - Physical Therapy Treatment Note  [facility]     Patient Name: Aime Hernandez  : 1945  MRN: [id number]  Today's Date: 3/16/2017  Onset of Illness/Injury or Date of Surgery Date: 17     Referring Physician: Alicia    Admit Date: 3/12/2017    Visit Dx:    ICD-10-CM ICD-9-CM   1. Acute posthemorrhagic anemia D62 285.1   2. Warfarin-induced coagulopathy (severe) T45.511A 286.9    D68.9 E934.2   3. Occult GI bleeding R19.5 792.1   4. Elevated troponin R79.89 790.6   5. Bruising and hematoma to left chest and left abdomen T14.8 924.9   6. Chronic atrial fibrillation I48.2 427.31   7. Toxic metabolic encephalopathy G92 349.82   8. Hypomagnesemia E83.42 275.2   9. Contusion of left upper arm, initial encounter S40.022A 923.03   10. Subdural hematoma I62.00 432.1     Patient Active Problem List   Diagnosis   • B12 deficiency   • Thrombocytopenia   • Carotid artery stenosis   • CAD (coronary artery disease)   • Chronic a-fib   • DM type 2 (diabetes mellitus, type 2)   • Acute posthemorrhagic anemia               Adult Rehabilitation Note       17 1227 17 1152 03/15/17 1356    Rehab Assessment/Intervention    Discipline occupational therapist  -AF physical therapist  -MQ occupational therapist  -AF    Document Type therapy note (daily note)  -AF therapy note (daily note)  -MQ therapy note (daily note)  -AF    Subjective Information no complaints;agree to therapy  -AF no complaints;agree to therapy   Pt more alert than he was this AM, per RN report.  -MQ agree to therapy;no complaints  -AF    Patient Effort, Rehab Treatment good  -AF good  -MQ good  -AF    Patient Effort, Rehab Treatment Comment pt. had a loose bowel movement when standing at EOB washing up, pt was unaware that he needed to use the bathroom. pt was lethargic and had a hard time keeping his eyes open during thsi treatment   -AF  pt was more lethargic and confusion today during treatmnet session, kept eyes closed  -AF     Symptoms Noted During/After Treatment  fatigue  -MQ     Precautions/Limitations fall precautions  -AF fall precautions  -MQ fall precautions  -AF    Recorded by [AF] Saritha Sherman OTR [MQ] Pamela Monterroso, PT [AF] Saritha Sherman, OTR    Vital Signs    Pretreatment Heart Rate (beats/min)  94  -MQ     Intratreatment Heart Rate (beats/min)  117  -MQ     Posttreatment Heart Rate (beats/min)  96  -MQ     Pre Patient Position  Supine  -MQ     Intra Patient Position  Standing  -MQ     Post Patient Position  Supine  -MQ     Recorded by  [MQ] Pamela Monterroso, PT     Pain Assessment    Pain Assessment No/denies pain  -AF No/denies pain  -MQ No/denies pain  -AF    Recorded by [AF] Saritha Sherman OTELEUTERIO [MQ] Pamela Monterroso, PT [AF] Saritha Sherman OTR    Vision Assessment/Intervention    Visual Impairment Comment   kept eyes closed, vc's to open to assist with standing balance  -AF    Recorded by   [AF] PAULA Daily    Cognitive Assessment/Intervention    Current Cognitive/Communication Assessment impaired  -AF impaired  -MQ impaired  -AF    Orientation Status oriented to;person   pt didn't know where he was this AM  -AF oriented to;person;place  -MQ oriented to;person  -AF    Follows Commands/Answers Questions 75% of the time;able to follow single-step instructions;needs cueing;needs increased time;needs repetition  -AF able to follow single-step instructions;75% of the time;needs cueing  -MQ 50% of the time;75% of the time;able to follow single-step instructions;needs cueing;needs increased time;needs repetition  -AF    Personal Safety mild impairment;decreased awareness, need for safety;impulsive  -AF mild impairment;decreased awareness, need for safety;impulsive  -MQ mild impairment;impulsive  -AF    Personal Safety Interventions fall prevention program maintained;nonskid shoes/slippers when out of bed;gait belt  -AF fall prevention program maintained;gait belt;nonskid shoes/slippers when out of  bed;supervised activity  - fall prevention program maintained;gait belt;nonskid shoes/slippers when out of bed  -AF    Recorded by [AF] Saritha Sherman OTR [MQ] Pamela Monterroso, PT [AF] Saritha Sherman OTR    Bed Mobility, Assessment/Treatment    Bed Mobility, Assistive Device  bed rails;head of bed elevated  -     Bed Mob, Supine to Sit, [place] minimum assist (75% patient effort);verbal cues required  -AF contact guard assist;verbal cues required  - moderate assist (50% patient effort);verbal cues required  -AF    Bed Mob, Sit to Supine, [place] minimum assist (75% patient effort);verbal cues required  -AF contact guard assist;verbal cues required  - minimum assist (75% patient effort);verbal cues required;nonverbal cues required (demo/gesture)  -    Bed Mobility, Safety Issues  decreased use of arms for pushing/pulling;decreased use of legs for bridging/pushing  -     Bed Mobility, Impairments  strength decreased  -     Recorded by [AF] PAULA Daily [MQ] Pamela Monterroso, PT [AF] Saritha Sherman OTR    Transfer Assessment/Treatment    Transfers, Bed-Chair [place] minimum assist (75% patient effort);verbal cues required  -AF      Transfers, Chair-Bed [place] minimum assist (75% patient effort);verbal cues required  -AF      Transfers, Sit-Stand [place] minimum assist (75% patient effort);verbal cues required  -AF contact guard assist;verbal cues required;nonverbal cues required (demo/gesture)  - contact guard assist;nonverbal cues required (demo/gesture);verbal cues required  -AF    Transfers, Stand-Sit [place] minimum assist (75% patient effort);verbal cues required  -AF contact guard assist;verbal cues required;nonverbal cues required (demo/gesture)  - verbal cues required;nonverbal cues required (demo/gesture);contact guard assist  -AF    Transfers, Sit-Stand-Sit, Assist Device  rolling walker  -     Transfer, Safety Issues  balance decreased during  turns;step length decreased;impulsivity  -MQ     Transfer, Impairments  strength decreased;impaired balance  -MQ     Recorded by [AF] PAULA Daily [MQ] Pamela Monterroso, PT [AF] Saritha Sherman OTR    Gait Assessment/Treatment    Gait, [place] Level  contact guard assist;verbal cues required  -MQ     Gait, Assistive Device  rolling walker  -MQ     Gait, Distance (Feet)  150  -MQ     Gait, Gait Deviations  tamika decreased;forward flexed posture;bilateral:;decreased heel strike;limb motion velocity decreased;step length decreased;toe-to-floor clearance decreased;other (see comments)   B knees flexed  -MQ     Gait, Safety Issues  balance decreased during turns;step length decreased  -MQ     Gait, Impairments  strength decreased;impaired balance  -MQ     Recorded by  [MQ] Pamela Monterroso, PT     Upper Body Bathing Assessment/Training    UB Bathing Assess/Train, Position sitting;edge of bed  -AF      UB Bathing Assess/Train, [place] Level supervision required;verbal cues required;contact guard assist  -AF      Recorded by [AF] Saritha Sherman OTR      Lower Body Bathing Assessment/Training    LB Bathing Assess/Train, Position standing;sitting;edge of bed  -AF      LB Bathing Assess/Train, [place] Level minimum assist (75% patient effort);contact guard assist;verbal cues required  -AF      LB Bathing Assess/Train, Impairments impaired balance  -AF      LB Bathing Assess/Train, Comment   pt stood at side of bed to wash bottom with CGA for balance  -AF    Recorded by [AF] PAULA Daily  [AF] Saritha Sherman OTR    Upper Body Dressing Assessment/Training    UB Dressing Assess/Train, Clothing Type doffing:;donning:;hospital gown  -AF      UB Dressing Assess/Train, Position sitting;edge of bed  -AF      UB Dressing Assess/Train, [place] verbal cues required;minimum assist (75% patient effort)  -AF      Recorded by [AF] PAULA Daily      Toileting Assessment/Training     Toileting Assess/Train, Comment dependent to clean up loose bowel mvt  -AF      Recorded by [AF] PAULA Daily      Grooming Assessment/Training    Grooming Assess/Train, Position sitting;edge of bed  -AF      Grooming Assess/Train, [place] Level verbal cues required;supervision required  -AF      Grooming Assess/Train, Comment   pt stood at side of bed to wash face with CGA  -AF    Recorded by [AF] PAULA Daily  [AF] PAULA Daily    Balance Skills Training    Standing-Level of Assistance Contact guard;Minimum assistance  -AF  Minimum assistance;Contact guard  -AF    Static Standing Balance Support No upper extremity supported  -AF  No upper extremity supported  -AF    Standing-Balance Activities --   ADls  -AF  --   ADL  -AF    Standing Balance # of Minutes 3-4 mins  -AF  2-3 mins  -AF    Recorded by [AF] PAULA Daily  [AF] PAULA Daily    Therapy Exercises    Bilateral Upper Extremity   5 reps;AAROM:;AROM:;sitting;elbow flexion/extension;shoulder extension/flexion;shoulder ER/IR  -AF    Recorded by   [AF] PAULA Daily    Functional Endurance    Detail (Functional Endurance) fair with ADL's, decreased endurance secodnary to fatigue  -AF  decreased endurance noted today unable to stand as long during ADL tasks and required 2 rest breaks   -AF    Recorded by [AF] PAULA Daily  [AF] PAULA Daily    Positioning and Restraints    Pre-Treatment Position in bed  -AF in bed  -MQ in bed  -AF    Post Treatment Position bed  -AF bed  -MQ bed  -AF    In Bed supine;call light within reach;encouraged to call for assist;exit alarm on;with nsg   with CNA staff  -AF supine;call light within reach;encouraged to call for assist;exit alarm on  -MQ supine;notified nsg;call light within reach;encouraged to call for assist;exit alarm on  -AF    Recorded by [AF] PAULA Daily [MQ] Pamela Monterrsoo, PT [AF] PAULA Daily      03/15/17 1155 03/14/17 1132        Rehab Assessment/Intervention    Discipline physical therapist  -CH physical therapist  -CH     Document Type therapy note (daily note)  -CH therapy note (daily note)  -CH     Subjective Information agree to therapy  -CH agree to therapy  -CH     Patient Effort, Rehab Treatment good  -CH good  -CH     Symptoms Noted During/After Treatment none  -CH none  -CH     Precautions/Limitations fall precautions   pt seems more confused today  -CH fall precautions  -CH     Recorded by [CH] Dorothy Stanford, PT [CH] Dorothy Stanford PT     Pain Assessment    Pain Assessment No/denies pain  -CH No/denies pain  -CH     Recorded by [CH] Dorothy Stanford, PT [CH] Dorothy Stanford PT     Cognitive Assessment/Intervention    Current Cognitive/Communication Assessment impaired  -CH impaired  -     Orientation Status oriented to;person  -CH oriented to;person   pt fixated on calling his wife  -CH     Follows Commands/Answers Questions 75% of the time;able to follow single-step instructions;needs cueing;needs increased time;needs repetition  -CH 75% of the time;able to follow single-step instructions;needs cueing;needs increased time;needs repetition  -     Personal Safety mild impairment;impulsive  -CH mild impairment;impulsive  -CH     Personal Safety Interventions fall prevention program maintained;gait belt;nonskid shoes/slippers when out of bed  - fall prevention program maintained;gait belt;nonskid shoes/slippers when out of bed  -CH     Recorded by [CH] Dorothy Stanford, PT [CH] Dorothy Stanford PT     Bed Mobility, Assessment/Treatment    Bed Mob, Supine to Sit, [place] verbal cues required;nonverbal cues required (demo/gesture);moderate assist (50% patient effort)  - verbal cues required;nonverbal cues required (demo/gesture);contact guard assist;2 person assist required  -     Bed Mob, Sit to Supine, [place] verbal cues required;nonverbal cues required (demo/gesture);minimum assist (75% patient  effort)  -CH verbal cues required;nonverbal cues required (demo/gesture);contact guard assist;2 person assist required  -CH     Bed Mobility, Comment pt seemd to have impaired motor planning when trying to get out of bed tihs AM thus required more assistance  -CH      Recorded by [CH] Dorothy Stanford PT [CH] Dorothy Stanford PT     Transfer Assessment/Treatment    Transfers, Sit-Stand [place] verbal cues required;nonverbal cues required (demo/gesture);contact guard assist  -CH verbal cues required;nonverbal cues required (demo/gesture);contact guard assist;2 person assist required  -CH     Transfers, Stand-Sit [place] verbal cues required;nonverbal cues required (demo/gesture);contact guard assist  -CH verbal cues required;nonverbal cues required (demo/gesture);contact guard assist;2 person assist required  -CH     Transfers, Sit-Stand-Sit, Assist Device rolling walker  -CH rolling walker  -CH     Recorded by [CH] Dorothy Stanford, PT [CH] Dorothy Stanford PT     Gait Assessment/Treatment    Gait, [place] Level verbal cues required;nonverbal cues required (demo/gesture);minimum assist (75% patient effort)  -CH verbal cues required;nonverbal cues required (demo/gesture);contact guard assist;2 person assist required  -     Gait, Assistive Device rolling walker  -CH rolling walker  -CH     Gait, Distance (Feet) 150  -  -     Gait, Gait Deviations tamika decreased;step length decreased;stride length decreased  - tamika decreased;forward flexed posture;step length decreased;stride length decreased  -     Gait, Safety Issues  step length decreased  -CH     Gait, Impairments  impaired balance  -     Gait, Comment pt required only CGA for most of ambulation. pt did have 1 LOB when he became distracted and required Sunny to correct balance  -CH      Recorded by [CH] Dorothy Stanford PT [CH] Dorothy Stanford PT     Positioning and Restraints    Pre-Treatment Position in bed  -CH in  bed  -CH     Post Treatment Position bed  -CH bed  -CH     In Bed supine;call light within reach;encouraged to call for assist;with nsg   RN present and said she would finish getting pt settled  -CH supine;call light within reach;encouraged to call for assist  -CH     Recorded by [CH] Dorothy Stanford, PT [CH] Dorothy Stanford, PT       User Key  (r) = Recorded By, (t) = Taken By, (c) = Cosigned By    Initials Name Effective Dates    CH Dorothy Stanford, PT 12/01/15 -     AF Saritha Sherman, OTR 12/01/15 -     MQ Pamela Monterroso, PT 12/11/15 -                 IP PT Goals       03/13/17 1502          Bed Mobility PT LTG    Bed Mobility PT LTG, Date Established 03/13/17  -AR      Bed Mobility PT LTG, Time to Achieve 1 wk  -AR      Bed Mobility PT LTG, Activity Type supine to sit/sit to supine  -AR      Bed Mobility PT LTG, [place] Level supervision required  -AR      Transfer Training PT LTG    Transfer Training PT LTG, Date Established 03/13/17  -AR      Transfer Training PT LTG, Time to Achieve 1 wk  -AR      Transfer Training PT LTG, Activity Type sit to stand/stand to sit;bed to chair /chair to bed  -AR      Transfer Training PT LTG, [place] Level supervision required  -AR      Transfer Training PT LTG, Assist Device walker, rolling  -AR      Gait Training PT LTG    Gait Training Goal PT LTG, Date Established 03/13/17  -AR      Gait Training Goal PT LTG, Time to Achieve 1 wk  -AR      Gait Training Goal PT LTG, [place] Level supervision required  -AR      Gait Training Goal PT LTG, Assist Device walker, rolling  -AR      Gait Training Goal PT LTG, Distance to Achieve 150  -AR        User Key  (r) = Recorded By, (t) = Taken By, (c) = Cosigned By    Initials Name Provider Type    AR Lexi Mercado PT Physical Therapist          Physical Therapy Education     Title: PT OT SLP Therapies (Active)     Topic: Physical Therapy (Done)     Point: Mobility training (Done)    Learning Progress Summary     Learner Readiness Method Response Comment Documented by Status   Patient Acceptance E,TB,D VU,NR   03/16/17 1312 Done    Acceptance E,TB,D VU,NR   03/15/17 1210 Done    Acceptance E,TB,D VU,NR   03/14/17 1200 Done    Acceptance E VU  AR 03/13/17 1503 Done               Point: Home exercise program (Done)    Learning Progress Summary    Learner Readiness Method Response Comment Documented by Status   Patient Acceptance E,TB,D VU,NR   03/16/17 1312 Done    Acceptance E VU  AR 03/13/17 1503 Done               Point: Body mechanics (Done)    Learning Progress Summary    Learner Readiness Method Response Comment Documented by Status   Patient Acceptance E,TB,D VU,NR   03/16/17 1312 Done    Acceptance E,TB,D VU,NR   03/15/17 1210 Done    Acceptance E,TB,D VU,NR   03/14/17 1200 Done    Acceptance E VU  AR 03/13/17 1503 Done               Point: Precautions (Done)    Learning Progress Summary    Learner Readiness Method Response Comment Documented by Status   Patient Acceptance E,TB,D VU,NR   03/16/17 1312 Done    Acceptance E,TB,D VU,NR   03/15/17 1210 Done    Acceptance E,TB,D VU,NR   03/14/17 1200 Done    Acceptance E VU  AR 03/13/17 1503 Done                      User Key     Initials Effective Dates Name Provider Type Discipline     12/01/15 -  Dorothy Stanford, PT Physical Therapist PT    AR 06/27/16 -  Lexi Mercado, PT Physical Therapist PT     12/11/15 -  Pamela Monterroso, PT Physical Therapist PT                    PT Recommendation and Plan  Anticipated Discharge Disposition: skilled nursing facility, home with assist, home with home health (pending progress/level of assist family can provide)  Planned Therapy Interventions: balance training, bed mobility training, gait training, home exercise program, patient/family education, ROM (Range of Motion), stair training, strengthening  PT Frequency: daily  Plan of Care Review  Plan Of Care Reviewed With: patient  Progress: improving  Outcome  Summary/Follow up Plan: Pt able to maintain distance walked today and required less physical assistance.           Outcome Measures       03/16/17 1201 03/15/17 1400 03/15/17 1200    How much help from another person do you currently need...    Turning from your back to your side while in flat bed without using bedrails? 3  -MQ  3  -CH    Moving from lying on back to sitting on the side of a flat bed without bedrails? 3  -MQ  2  -CH    Moving to and from a bed to a chair (including a wheelchair)? 3  -MQ  3  -CH    Standing up from a chair using your arms (e.g., wheelchair, bedside chair)? 3  -MQ  3  -CH    Climbing 3-5 steps with a railing? 2  -MQ  2  -CH    To walk in hospital room? 3  -MQ  3  -CH    AM-PAC 6 Clicks Score 17  -MQ  16  -CH    How much help from another is currently needed...    Putting on and taking off regular lower body clothing?  2  -AF     Bathing (including washing, rinsing, and drying)  3  -AF     Toileting (which includes using toilet bed pan or urinal)  3  -AF     Putting on and taking off regular upper body clothing  3  -AF     Taking care of personal grooming (such as brushing teeth)  3  -AF     Eating meals  2  -AF     Score  16  -AF     Functional Assessment    Outcome Measure Options AM-PAC 6 Clicks Basic Mobility (PT)  -MQ  AM-PAC 6 Clicks Basic Mobility (PT)  -CH      03/14/17 1337 03/14/17 1200 03/13/17 1500    How much help from another person do you currently need...    Turning from your back to your side while in flat bed without using bedrails?  3  -CH 3  -AR    Moving from lying on back to sitting on the side of a flat bed without bedrails?  3  -CH 3  -AR    Moving to and from a bed to a chair (including a wheelchair)?  3  -CH 3  -AR    Standing up from a chair using your arms (e.g., wheelchair, bedside chair)?  3  -CH 3  -AR    Climbing 3-5 steps with a railing?  2  -CH 2  -AR    To walk in hospital room?  3  -CH 3  -AR    AM-PAC 6 Clicks Score  17  -CH 17  -AR    How much help  from another is currently needed...    Putting on and taking off regular lower body clothing? 3  -AF      Bathing (including washing, rinsing, and drying) 3  -AF      Toileting (which includes using toilet bed pan or urinal) 3  -AF      Putting on and taking off regular upper body clothing 3  -AF      Taking care of personal grooming (such as brushing teeth) 3  -AF      Eating meals 3  -AF      Score 18  -AF      Functional Assessment    Outcome Measure Options AM-PAC 6 Clicks Daily Activity (OT)  -AF AM-PAC 6 Clicks Basic Mobility (PT)  - AM-PAC 6 Clicks Basic Mobility (PT)  -AR      User Key  (r) = Recorded By, (t) = Taken By, (c) = Cosigned By    Initials Name Provider Type     Dorothy Stanford, PT Physical Therapist    AF Saritha Sherman, OTR Occupational Therapist    AR Lexi Mercado, PT Physical Therapist     Pamela Monterroso PT Physical Therapist           Time Calculation:         PT Charges       03/16/17 1314          Time Calculation    Start Time 1144  -      Stop Time 1201  -      Time Calculation (min) 17 min  -      PT Received On 03/16/17  -      PT - Next Appointment 03/17/17  -      PT Goal Re-Cert Due Date 03/20/17  -        User Key  (r) = Recorded By, (t) = Taken By, (c) = Cosigned By    Initials Name Provider Type     Pamela Monterroso, [place] Physical Therapist          Therapy Charges for Today     Code Description Service Date Service Provider Modifiers Qty    63853812963 HC PT THER PROC EA 15 MIN 3/16/2017 Pamela Monterroso, PT GP 1    80365198517 HC PT THER SUPP EA 15 MIN 3/16/2017 Pamela Monterroso, PT GP 1          PT G-Codes  Outcome Measure Options: AM-PAC 6 Clicks Basic Mobility (PT)    Pamela Monterroso PT  3/16/2017

## 2017-03-16 NOTE — PLAN OF CARE
Problem: Fall Risk (Adult)  Goal: Absence of Falls  Outcome: Ongoing (interventions implemented as appropriate)    Problem: Confusion, Acute (Adult)  Goal: Cognitive/Functional Impairments Minimized  Outcome: Ongoing (interventions implemented as appropriate)    03/16/17 1444   Confusion, Acute (Adult)   Cognitive/Functional Impairments Minimized making progress toward outcome         Problem: Stroke (Hemorrhagic) (Adult)  Goal: Signs and Symptoms of Listed Potential Problems Will be Absent or Manageable (Stroke)  Outcome: Ongoing (interventions implemented as appropriate)    03/16/17 0451   Stroke (Hemorrhagic)   Problems Assessed (Stroke (Hemorrhagic)) all   Problems Present (Stroke (Hemorrhagic)) cognitive impairment;situational response         Problem: Diabetes, Type 2 (Adult)  Goal: Signs and Symptoms of Listed Potential Problems Will be Absent or Manageable (Diabetes, Type 2)  Outcome: Ongoing (interventions implemented as appropriate)    03/16/17 0451   Diabetes, Type 2   Problems Assessed (Type 2 Diabetes) all   Problems Present (Type 2 Diabetes) impaired glycemic control         Problem: Patient Care Overview (Adult)  Goal: Plan of Care Review  Outcome: Ongoing (interventions implemented as appropriate)    03/16/17 1312 03/16/17 1429 03/16/17 1444   Coping/Psychosocial Response Interventions   Plan Of Care Reviewed With --  patient --    Patient Care Overview   Progress improving --  --    Outcome Evaluation   Outcome Summary/Follow up Plan --  --  Patient drowsy today. Vitals stable. Meds given per MD order. Monitoring glucose closely. Will contihue to monitor

## 2017-03-16 NOTE — PLAN OF CARE
Problem: Patient Care Overview (Adult)  Goal: Plan of Care Review  Outcome: Ongoing (interventions implemented as appropriate)    03/16/17 1312   Coping/Psychosocial Response Interventions   Plan Of Care Reviewed With patient   Patient Care Overview   Progress improving   Outcome Evaluation   Outcome Summary/Follow up Plan Pt able to maintain distance walked today and required less physical assistance.

## 2017-03-16 NOTE — PROGRESS NOTES
No new complaints  No family  Denies HA    AVSS  Awake, drowsy, Ox person, hospital, president.  Could not give correct month or year  JAY well    ..    Results from last 7 days  Lab Units 03/15/17  0910   WBC 10*3/mm3 9.68   HEMOGLOBIN g/dL 9.0*   HEMATOCRIT % 25.9*   PLATELETS 10*3/mm3 177     ..    Results from last 7 days  Lab Units 03/16/17  0308   INR  1.48*       S/p fall 4wks ago  L>R SDH  Hx of Afib, off coumadin  TME    Cont care  Check rpt CT in am  If stable then he can go from our standpoint

## 2017-03-16 NOTE — PROGRESS NOTES
[facility]      Mar/chart reviewed  F/u ICH and acute blood loss anemia. Less agitated, out of restraints     Vital Sign Min/Max for last 24 hours  Temp  Min: 98 °F (36.7 °C)  Max: 99.2 °F (37.3 °C)   BP  Min: 121/76  Max: 151/81   Pulse  Min: 96  Max: 117   Resp  Min: 18  Max: 27   SpO2  Min: 95 %  Max: 98 %   No Data Recorded   Weight  Min: 211 lb 8 oz (95.9 kg)  Max: 211 lb 8 oz (95.9 kg)     Nad, axox2,   perrl, eomi, no icterus,  mmm, no jvd, trachea midline, neck supple,  chest cta bilaterally, no crackles, no wheezes,   rrr,   soft, nt, nd +bs,  no c/c/ e    Labs: reviewed    A/P:  1. Acute subdural hematoma -- corrected coagulopathy, Systolic <160  2. Multiple hematomas -- axillary, si joing, iliopsoas -- corrected coagulopathy  3. Acute blood loss anemia -- monitor transfuse as needed, I think his hematomas explain it; he did have guiac positive stool in er but I don't think this is the major source of blood loss  4. Elevated troponin -- will have cardiology to see  5. coagulopathy -- given kcentra and vit k in er per protocol, most recent check, inr is ok.  6. Dm/hyperglycemia -- ssi as per icu protocol  7. afib -- rate controlled, continue to hold anticoagulation  8. Toxic metabolic encephalopathy- suspect encephalopathy 2/2 #1; likely some dementia with icu psychosis as well.   9. bp control -- keep systolic less than 160     Will need placement,  Can be discharged when this is arranged.

## 2017-03-16 NOTE — PLAN OF CARE
Problem: Fall Risk (Adult)  Goal: Absence of Falls  Outcome: Ongoing (interventions implemented as appropriate)    Problem: Confusion, Acute (Adult)  Goal: Cognitive/Functional Impairments Minimized  Outcome: Ongoing (interventions implemented as appropriate)  Goal: Safety  Outcome: Ongoing (interventions implemented as appropriate)    Problem: Stroke (Hemorrhagic) (Adult)  Goal: Signs and Symptoms of Listed Potential Problems Will be Absent or Manageable (Stroke)  Outcome: Ongoing (interventions implemented as appropriate)    Problem: Diabetes, Type 2 (Adult)  Goal: Signs and Symptoms of Listed Potential Problems Will be Absent or Manageable (Diabetes, Type 2)  Outcome: Ongoing (interventions implemented as appropriate)    Problem: Patient Care Overview (Adult)  Goal: Plan of Care Review  Outcome: Ongoing (interventions implemented as appropriate)    03/16/17 0451   Coping/Psychosocial Response Interventions   Plan Of Care Reviewed With patient   Patient Care Overview   Progress no change   Outcome Evaluation   Outcome Summary/Follow up Plan Vitals stable. No falls during shift. Pt oriented to self and place. No s/s of stroke present. No c/o pain. Pt turned Q2. Q4 neuro checks.

## 2017-03-17 NOTE — PLAN OF CARE
Problem: Fall Risk (Adult)  Goal: Absence of Falls  Outcome: Ongoing (interventions implemented as appropriate)    Problem: Confusion, Acute (Adult)  Goal: Cognitive/Functional Impairments Minimized  Outcome: Ongoing (interventions implemented as appropriate)  Goal: Safety  Outcome: Ongoing (interventions implemented as appropriate)    Problem: Stroke (Hemorrhagic) (Adult)  Goal: Signs and Symptoms of Listed Potential Problems Will be Absent or Manageable (Stroke)  Outcome: Ongoing (interventions implemented as appropriate)    Problem: Diabetes, Type 2 (Adult)  Goal: Signs and Symptoms of Listed Potential Problems Will be Absent or Manageable (Diabetes, Type 2)  Outcome: Ongoing (interventions implemented as appropriate)    Problem: Patient Care Overview (Adult)  Goal: Plan of Care Review    03/17/17 0532   Coping/Psychosocial Response Interventions   Plan Of Care Reviewed With patient   Patient Care Overview   Progress improving   Outcome Evaluation   Outcome Summary/Follow up Plan Vitals stable. No falls. Pt restless throughout the night, getting up to chair frequently. Menjivar care done. -140, IV metoprolol given.

## 2017-03-17 NOTE — PROGRESS NOTES
Continued Stay Note  [facility]     Patient Name: Aime Hernandez  MRN: [id number]  Today's Date: 3/17/2017    Admit Date: 3/12/2017          Discharge Plan       03/17/17 1322    Case Management/Social Work Plan    Plan [facility]    Patient/Family In Agreement With Plan yes    Additional Comments dc orders noted, spoke with Geovani with [facility] yes dtr correct they will provide transportation from hospital to rehab today. Spoke with patient at bedside, but he is still confused...Updated dtr Flor. Packet given to ADILENE Freedman. César RN/CCP      03/17/17 1034    Case Management/Social Work Plan    Plan [facility]    Patient/Family In Agreement With Plan yes    Additional Comments Spoke with Pamela from [facility] they do not have any beds available today for dc. Spoke with Geovani with [facility] bed is available today and patient has been accepted.. CCP called and left message for pts daughter Flor requested call back. CCP called pts wife and spoke with pts daughter and she agreed for [facility] and believes when she spoke with Geovani yesterday that they provide pickup from hospital. CCP called Geovani and left vm pt to be discharged to [facility] today and requested call back. César HEAD/CCP              Discharge Codes     None        Expected Discharge Date and Time     Expected Discharge Date Expected Discharge Time    Mar 17, 2017             Karo Whelan, ADILENE

## 2017-03-17 NOTE — DISCHARGE SUMMARY
[facility]    Admit date: 3/12/2017  Discharge date: 3/17/2017    Admission/discharge diagnosis:   1. Acute subdural hematoma -- corrected coagulopathy,   2. Multiple hematomas -- axillary, si joing, iliopsoas -- corrected coagulopathy  3. Acute blood loss anemia -- monitor transfuse as needed, I think his hematomas explain it; he did have guiac positive stool in er but I don't think this is the major source of blood loss  4. Elevated troponin -- will have cardiology to see  5. coagulopathy -- given kcentra and vit k in er per protocol, most recent check, inr is ok.  6. Dm/hyperglycemia -- ssi as per icu protocol  7. afib -- rate controlled, continue to hold anticoagulation  8. Toxic metabolic encephalopathy- suspect encephalopathy 2/2 #1; likely some dementia with icu psychosis as well.   9. bp control --      HPI:   Mr. Hernandez is a 70yo WM with a history of Afib and history of recent admission just about a month ago after sustaining a fall. He comes to the ER via EMS from home for confusion, weakness, and fatigue. He sates he has felt like he has a cold for about a month and he fell at somepoint. History is not terribly clear because he is confused.   He does not report specific alleviating or exacerbating factors    Hospital Course:  Mr. Hernandez continued to be confused, but did improve significantly. He had ongoing problems with urinary retention.     Dispo: rehab    Discharge medications: per list    Activity: falls precautions    Follow - up:  Dr. Sherwood as per his instructions    Greater than 30 mins spent in discharging patient    After d/c completed I was called and told neurosurgery and cardiology conferred and eliquis is to be restarted.

## 2017-03-17 NOTE — PROGRESS NOTES
"CC: CAD/AFib    Interval History:   More alert today    Vital Signs  Temp:  [97.7 °F (36.5 °C)-98.5 °F (36.9 °C)] 98.5 °F (36.9 °C)  Heart Rate:  [] 122  Resp:  [16-18] 18  BP: ()/(52-75) 98/68  No intake or output data in the 24 hours ending 03/17/17 0759  Flowsheet Rows         First Filed Value    Admission Height  70\" (177.8 cm) Documented at 03/12/2017 1243    Admission Weight  200 lb (90.7 kg) Documented at 03/12/2017 1243          PHYSICAL EXAM:  General: No acute distress  Resp:NL Rate, unlabored, clear  CV:NL rate and irregular rhythm, NL PMI, Nl S1 and S2, no Mumur, no gallop, no rub, No JVD. Normal pedal pulses  ABD:Nl sounds, no masses or tenderness, nondistended, no quarding or rebound  Neuro: alert,cooperative and not oriented to place and he knew the date and who I was again.  Extr: No edema or cyanosis, moves all extremities    Results Review:      Results from last 7 days  Lab Units 03/16/17  0308   SODIUM mmol/L 140   POTASSIUM mmol/L 3.9   CHLORIDE mmol/L 103   TOTAL CO2 mmol/L 22.5   BUN mg/dL 13   CREATININE mg/dL 0.65*   GLUCOSE mg/dL 188*   CALCIUM mg/dL 8.5*       Results from last 7 days  Lab Units 03/13/17  0451 03/12/17  1304   TROPONIN T ng/mL 0.112* 0.145*       Results from last 7 days  Lab Units 03/15/17  0910   WBC 10*3/mm3 9.68   HEMOGLOBIN g/dL 9.0*   HEMATOCRIT % 25.9*   PLATELETS 10*3/mm3 177       Results from last 7 days  Lab Units 03/16/17  0308 03/15/17  0910 03/14/17  0401   INR  1.48* 1.46* 1.18*           Results from last 7 days  Lab Units 03/13/17  1807   MAGNESIUM mg/dL 1.8         @LABRCNT(bnp)@  I reviewed the patient's new clinical results.  I personally viewed and interpreted the patient's EKG/Telemetry data        Medication Review:   Meds reviewed         Assessment/Plan  1. Subdural hematoma, recent fall in February of this year INR elevated on admission Reinstitute warfarin at some time.Still confused some but better at least he knew he wasn't at " home  2. Warfarin toxicity as above now reversed holding warfarin  3. Elevated troponin: Doubt acute myocardial infarction, may be stress induced  4. History of severe coronary disease status post  coronary artery bypass grafting, mild left ventricular systolic dysfunction, left ventricular ejection fraction of 45%. 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 Perfusion stress test June 2016 no ischemia echocardiogram February 2017 normal left ventricular ejection fraction with concentric hypertrophy.  5. Chronic atrial fibrillation: Was on warfarin therapy. INR elevated on admission Stopped at present. Heart rate still increased add digoxin, hold metoprolol for BP < 90. Restart warfarin as OP when ok with neurosurgery.  6. Anemia: STable  7. Diabetes mellitus     Sea Sherwood MD  03/17/17  7:59 AM

## 2017-03-17 NOTE — PLAN OF CARE
Problem: Inpatient Physical Therapy  Goal: Bed Mobility Goal LTG- PT  Outcome: Unable to achieve outcome(s) by discharge Date Met:  03/17/17 03/17/17 1202   Bed Mobility PT LTG   Bed Mobility PT LTG, Date Goal Reviewed 03/17/17   Bed Mobility PT LTG, Outcome goal partially met   Bed Mobility PT LTG, Reason Goal Not Met discharged from facility       Goal: Transfer Training Goal 1 LTG- PT  Outcome: Unable to achieve outcome(s) by discharge Date Met:  03/17/17 03/17/17 1202   Transfer Training PT LTG   Transfer Training PT LTG, Date Goal Reviewed 03/17/17   Transfer Training PT LTG, Outcome goal partially met   Transfer Training PT LTG, Reason Goal Not Met discharged from facility       Goal: Gait Training Goal LTG- PT  Outcome: Unable to achieve outcome(s) by discharge Date Met:  03/17/17 03/17/17 1202   Gait Training PT LTG   Gait Training Goal PT LTG, Date Goal Reviewed 03/17/17   Gait Training Goal PT LTG, Outcome goal partially met   Gait Training Goal PT LTG, Reason Goal Not Met discharged from facility

## 2017-03-17 NOTE — THERAPY DISCHARGE NOTE
Acute Care - Physical Therapy Treatment Note/Discharge  [facility]     Patient Name: Aime Hernandez  : 1945  MRN: [id number]  Today's Date: 3/17/2017  Onset of Illness/Injury or Date of Surgery Date: 17     Referring Physician: Alicia    Admit Date: 3/12/2017    Visit Dx:    ICD-10-CM ICD-9-CM   1. Acute posthemorrhagic anemia D62 285.1   2. Warfarin-induced coagulopathy (severe) T45.511A 286.9    D68.9 E934.2   3. Occult GI bleeding R19.5 792.1   4. Elevated troponin R79.89 790.6   5. Bruising and hematoma to left chest and left abdomen T14.8 924.9   6. Chronic atrial fibrillation I48.2 427.31   7. Toxic metabolic encephalopathy G92 349.82   8. Hypomagnesemia E83.42 275.2   9. Contusion of left upper arm, initial encounter S40.022A 923.03   10. Subdural hematoma I62.00 432.1     Patient Active Problem List   Diagnosis   • B12 deficiency   • Thrombocytopenia   • Carotid artery stenosis   • CAD (coronary artery disease)   • Chronic a-fib   • DM type 2 (diabetes mellitus, type 2)   • Acute posthemorrhagic anemia       Physical Therapy Education     Title: PT OT SLP Therapies (Resolved)     Topic: Physical Therapy (Resolved)     Point: Mobility training (Resolved)    Learning Progress Summary    Learner Readiness Method Response Comment Documented by Status   Patient Acceptance E,TB,D VU,NR   17 1312 Done    Acceptance E,TB,D VU,NR   03/15/17 1210 Done    Acceptance E,TB,D VU,NR   17 1200 Done    Acceptance E VU  AR 17 1503 Done               Point: Home exercise program (Resolved)    Learning Progress Summary    Learner Readiness Method Response Comment Documented by Status   Patient Acceptance E,TB,D VU,NR   17 1312 Done    Acceptance E VU  AR 17 1503 Done               Point: Body mechanics (Resolved)    Learning Progress Summary    Learner Readiness Method Response Comment Documented by Status   Patient Acceptance E,TB,D VU,NR   17 1312 Done     Acceptance E,TB,D VU,NR  CH 03/15/17 1210 Done    Acceptance E,TB,D VU,NR  CH 03/14/17 1200 Done    Acceptance E VU  AR 03/13/17 1503 Done               Point: Precautions (Resolved)    Learning Progress Summary    Learner Readiness Method Response Comment Documented by Status   Patient Acceptance E,TB,D VU,NR  MQ 03/16/17 1312 Done    Acceptance E,TB,D VU,NR  CH 03/15/17 1210 Done    Acceptance E,TB,D VU,NR   03/14/17 1200 Done    Acceptance E VU  AR 03/13/17 1503 Done                      User Key     Initials Effective Dates Name Provider Type Discipline     12/01/15 -  Dorothy Stanford, PT Physical Therapist PT    AR 06/27/16 -  Lexi Mercado, PT Physical Therapist PT     12/11/15 -  Pamela Monterroso, PT Physical Therapist PT                    IP PT Goals       03/17/17 1202 03/13/17 1502       Bed Mobility PT LTG    Bed Mobility PT LTG, Date Established  03/13/17  -AR     Bed Mobility PT LTG, Time to Achieve  1 wk  -AR     Bed Mobility PT LTG, Activity Type  supine to sit/sit to supine  -AR     Bed Mobility PT LTG, [place] Level  supervision required  -AR     Bed Mobility PT LTG, Date Goal Reviewed (P)  03/17/17  -      Bed Mobility PT LTG, Outcome (P)  goal partially met  -      Bed Mobility PT LTG, Reason Goal Not Met (P)  discharged from facility  -      Transfer Training PT LTG    Transfer Training PT LTG, Date Established  03/13/17  -AR     Transfer Training PT LTG, Time to Achieve  1 wk  -AR     Transfer Training PT LTG, Activity Type  sit to stand/stand to sit;bed to chair /chair to bed  -AR     Transfer Training PT LTG, [place] Level  supervision required  -AR     Transfer Training PT LTG, Assist Device  walker, rolling  -AR     Transfer Training PT  LTG, Date Goal Reviewed (P)  03/17/17  -      Transfer Training PT LTG, Outcome (P)  goal partially met  -      Transfer Training PT LTG, Reason Goal Not Met (P)  discharged from facility  -      Gait Training PT LTG    Gait  Training Goal PT LTG, Date Established  03/13/17  -AR     Gait Training Goal PT LTG, Time to Achieve  1 wk  -AR     Gait Training Goal PT LTG, [place] Level  supervision required  -AR     Gait Training Goal PT LTG, Assist Device  walker, rolling  -AR     Gait Training Goal PT LTG, Distance to Achieve  150  -AR     Gait Training Goal PT LTG, Date Goal Reviewed (P)  03/17/17  -      Gait Training Goal PT LTG, Outcome (P)  goal partially met  -      Gait Training Goal PT LTG, Reason Goal Not Met (P)  discharged from facility  -        User Key  (r) = Recorded By, (t) = Taken By, (c) = Cosigned By    Initials Name Provider Type    AR Lexi Mercado, PT Physical Therapist     Reagan Bowman, PT Student PT Student              Adult Rehabilitation Note       03/16/17 1227 03/16/17 1152 03/15/17 1356    Rehab Assessment/Intervention    Discipline occupational therapist  -AF physical therapist  -MQ occupational therapist  -AF    Document Type therapy note (daily note)  -AF therapy note (daily note)  -MQ therapy note (daily note)  -AF    Subjective Information no complaints;agree to therapy  -AF no complaints;agree to therapy   Pt more alert than he was this AM, per RN report.  -MQ agree to therapy;no complaints  -AF    Patient Effort, Rehab Treatment good  -AF good  -MQ good  -AF    Patient Effort, Rehab Treatment Comment pt. had a loose bowel movement when standing at EOB washing up, pt was unaware that he needed to use the bathroom. pt was lethargic and had a hard time keeping his eyes open during thsi treatment   -AF  pt was more lethargic and confusion today during treatmnet session, kept eyes closed  -AF    Symptoms Noted During/After Treatment  fatigue  -MQ     Precautions/Limitations fall precautions  -AF fall precautions  -MQ fall precautions  -AF    Recorded by [AF] Saritha Sherman, OTR [MQ] Pamela Monterroso, PT [AF] Saritha Sherman, OTR    Vital Signs    Pretreatment Heart Rate (beats/min)  94  -MQ      Intratreatment Heart Rate (beats/min)  117  -MQ     Posttreatment Heart Rate (beats/min)  96  -MQ     Pre Patient Position  Supine  -MQ     Intra Patient Position  Standing  -MQ     Post Patient Position  Supine  -MQ     Recorded by  [MQ] Pamela Monterroso, PT     Pain Assessment    Pain Assessment No/denies pain  -AF No/denies pain  -MQ No/denies pain  -AF    Recorded by [AF] Saritha Sherman, OTR [MQ] Pamela Monterroos, PT [AF] Saritha Sherman, OTR    Vision Assessment/Intervention    Visual Impairment Comment   kept eyes closed, vc's to open to assist with standing balance  -AF    Recorded by   [AF] Saritha Sherman, OTR    Cognitive Assessment/Intervention    Current Cognitive/Communication Assessment impaired  -AF impaired  -MQ impaired  -AF    Orientation Status oriented to;person   pt didn't know where he was this AM  -AF oriented to;person;place  -MQ oriented to;person  -AF    Follows Commands/Answers Questions 75% of the time;able to follow single-step instructions;needs cueing;needs increased time;needs repetition  -AF able to follow single-step instructions;75% of the time;needs cueing  -MQ 50% of the time;75% of the time;able to follow single-step instructions;needs cueing;needs increased time;needs repetition  -AF    Personal Safety mild impairment;decreased awareness, need for safety;impulsive  -AF mild impairment;decreased awareness, need for safety;impulsive  -MQ mild impairment;impulsive  -AF    Personal Safety Interventions fall prevention program maintained;nonskid shoes/slippers when out of bed;gait belt  -AF fall prevention program maintained;gait belt;nonskid shoes/slippers when out of bed;supervised activity  -MQ fall prevention program maintained;gait belt;nonskid shoes/slippers when out of bed  -AF    Recorded by [AF] Saritha Sherman, OTR [MQ] Pamela Monterroso, PT [AF] Saritha Sherman, OTR    Bed Mobility, Assessment/Treatment    Bed Mobility, Assistive Device  bed rails;head of bed elevated   -MQ     Bed Mob, Supine to Sit, [place] minimum assist (75% patient effort);verbal cues required  -AF contact guard assist;verbal cues required  -MQ moderate assist (50% patient effort);verbal cues required  -AF    Bed Mob, Sit to Supine, [place] minimum assist (75% patient effort);verbal cues required  -AF contact guard assist;verbal cues required  -MQ minimum assist (75% patient effort);verbal cues required;nonverbal cues required (demo/gesture)  -AF    Bed Mobility, Safety Issues  decreased use of arms for pushing/pulling;decreased use of legs for bridging/pushing  -MQ     Bed Mobility, Impairments  strength decreased  -MQ     Recorded by [AF] Saritha Sherman, OTR [MQ] Pamela Monterroso, PT [AF] Saritha Sherman OTR    Transfer Assessment/Treatment    Transfers, Bed-Chair [place] minimum assist (75% patient effort);verbal cues required  -AF      Transfers, Chair-Bed [place] minimum assist (75% patient effort);verbal cues required  -AF      Transfers, Sit-Stand [place] minimum assist (75% patient effort);verbal cues required  -AF contact guard assist;verbal cues required;nonverbal cues required (demo/gesture)  -MQ contact guard assist;nonverbal cues required (demo/gesture);verbal cues required  -AF    Transfers, Stand-Sit [place] minimum assist (75% patient effort);verbal cues required  -AF contact guard assist;verbal cues required;nonverbal cues required (demo/gesture)  -MQ verbal cues required;nonverbal cues required (demo/gesture);contact guard assist  -AF    Transfers, Sit-Stand-Sit, Assist Device  rolling walker  -MQ     Transfer, Safety Issues  balance decreased during turns;step length decreased;impulsivity  -MQ     Transfer, Impairments  strength decreased;impaired balance  -MQ     Recorded by [AF] Saritha Sherman, OTR [MQ] Pamela Monterroso, PT [AF] Saritha Sherman, OTR    Gait Assessment/Treatment    Gait, [place] Level  contact guard assist;verbal cues required  -MQ      Gait, Assistive Device  rolling walker  -MQ     Gait, Distance (Feet)  150  -MQ     Gait, Gait Deviations  tamika decreased;forward flexed posture;bilateral:;decreased heel strike;limb motion velocity decreased;step length decreased;toe-to-floor clearance decreased;other (see comments)   B knees flexed  -MQ     Gait, Safety Issues  balance decreased during turns;step length decreased  -MQ     Gait, Impairments  strength decreased;impaired balance  -MQ     Recorded by  [MQ] Pamela Monterroso, PT     Upper Body Bathing Assessment/Training    UB Bathing Assess/Train, Position sitting;edge of bed  -AF      UB Bathing Assess/Train, [place] Level supervision required;verbal cues required;contact guard assist  -AF      Recorded by [AF] Saritha Sherman, JENNIFERR      Lower Body Bathing Assessment/Training    LB Bathing Assess/Train, Position standing;sitting;edge of bed  -AF      LB Bathing Assess/Train, [place] Level minimum assist (75% patient effort);contact guard assist;verbal cues required  -AF      LB Bathing Assess/Train, Impairments impaired balance  -AF      LB Bathing Assess/Train, Comment   pt stood at side of bed to wash bottom with CGA for balance  -AF    Recorded by [AF] Saritha Sherman OTR  [AF] Saritha Sherman OTR    Upper Body Dressing Assessment/Training    UB Dressing Assess/Train, Clothing Type doffing:;donning:;hospital gown  -AF      UB Dressing Assess/Train, Position sitting;edge of bed  -AF      UB Dressing Assess/Train, [place] verbal cues required;minimum assist (75% patient effort)  -AF      Recorded by [AF] Saritha Sherman OTR      Toileting Assessment/Training    Toileting Assess/Train, Comment dependent to clean up loose bowel mvt  -AF      Recorded by [AF] Saritha Sherman OTR      Grooming Assessment/Training    Grooming Assess/Train, Position sitting;edge of bed  -AF      Grooming Assess/Train, [place] Level verbal cues required;supervision required  -AF      Grooming  Assess/Train, Comment   pt stood at side of bed to wash face with CGA  -AF    Recorded by [AF] Saritha Sherman OTR  [AF] PAULA Daily    Balance Skills Training    Standing-Level of Assistance Contact guard;Minimum assistance  -AF  Minimum assistance;Contact guard  -AF    Static Standing Balance Support No upper extremity supported  -AF  No upper extremity supported  -AF    Standing-Balance Activities --   ADls  -AF  --   ADL  -AF    Standing Balance # of Minutes 3-4 mins  -AF  2-3 mins  -AF    Recorded by [AF] Saritha Sherman OTR  [AF] PAULA Daily    Therapy Exercises    Bilateral Upper Extremity   5 reps;AAROM:;AROM:;sitting;elbow flexion/extension;shoulder extension/flexion;shoulder ER/IR  -AF    Recorded by   [AF] PAULA Daily    Functional Endurance    Detail (Functional Endurance) fair with ADL's, decreased endurance secodnary to fatigue  -AF  decreased endurance noted today unable to stand as long during ADL tasks and required 2 rest breaks   -AF    Recorded by [AF] PAULA Daily  [AF] PAULA Daily    Positioning and Restraints    Pre-Treatment Position in bed  -AF in bed  -MQ in bed  -AF    Post Treatment Position bed  -AF bed  -MQ bed  -AF    In Bed supine;call light within reach;encouraged to call for assist;exit alarm on;with nsg   with CNA staff  -AF supine;call light within reach;encouraged to call for assist;exit alarm on  -MQ supine;notified nsg;call light within reach;encouraged to call for assist;exit alarm on  -AF    Recorded by [AF] PAULA Daily [MQ] Pamela Monterroso, PT [AF] Saritha Sherman OTR      03/15/17 3202          Rehab Assessment/Intervention    Discipline physical therapist  -CH      Document Type therapy note (daily note)  -CH      Subjective Information agree to therapy  -CH      Patient Effort, Rehab Treatment good  -CH      Symptoms Noted During/After Treatment none  -CH      Precautions/Limitations fall precautions   pt  seems more confused today  -CH      Recorded by [CH] Dorothy Stanford PT      Pain Assessment    Pain Assessment No/denies pain  -CH      Recorded by [CH] Dorothy Stanford PT      Cognitive Assessment/Intervention    Current Cognitive/Communication Assessment impaired  -      Orientation Status oriented to;person  -      Follows Commands/Answers Questions 75% of the time;able to follow single-step instructions;needs cueing;needs increased time;needs repetition  -      Personal Safety mild impairment;impulsive  -      Personal Safety Interventions fall prevention program maintained;gait belt;nonskid shoes/slippers when out of bed  -CH      Recorded by [CH] Dorothy Stanford PT      Bed Mobility, Assessment/Treatment    Bed Mob, Supine to Sit, [place] verbal cues required;nonverbal cues required (demo/gesture);moderate assist (50% patient effort)  -      Bed Mob, Sit to Supine, [place] verbal cues required;nonverbal cues required (demo/gesture);minimum assist (75% patient effort)  -      Bed Mobility, Comment pt seemd to have impaired motor planning when trying to get out of bed tihs AM thus required more assistance  -CH      Recorded by [CH] Dorothy Stanford PT      Transfer Assessment/Treatment    Transfers, Sit-Stand [place] verbal cues required;nonverbal cues required (demo/gesture);contact guard assist  -      Transfers, Stand-Sit [place] verbal cues required;nonverbal cues required (demo/gesture);contact guard assist  -      Transfers, Sit-Stand-Sit, Assist Device rolling walker  -CH      Recorded by [CH] Dorothy Stanford PT      Gait Assessment/Treatment    Gait, [place] Level verbal cues required;nonverbal cues required (demo/gesture);minimum assist (75% patient effort)  -      Gait, Assistive Device rolling walker  -      Gait, Distance (Feet) 150  -      Gait, Gait Deviations tamika decreased;step length decreased;stride length decreased  -       Gait, Comment pt required only CGA for most of ambulation. pt did have 1 LOB when he became distracted and required Sunny to correct balance  -CH      Recorded by [CH] Dorothy Stanford, PT      Positioning and Restraints    Pre-Treatment Position in bed  -CH      Post Treatment Position bed  -CH      In Bed supine;call light within reach;encouraged to call for assist;with nsg   RN present and said she would finish getting pt settled  -CH      Recorded by [CH] Dorothy Stanford, PT        User Key  (r) = Recorded By, (t) = Taken By, (c) = Cosigned By    Initials Name Effective Dates    CH Dorothy Stanford, PT 12/01/15 -     AF Saritha Sherman, OTR 12/01/15 -     MQ Pamela Monterroso, PT 12/11/15 -           PT Recommendation and Plan  Anticipated Discharge Disposition: (P) skilled nursing facility  Planned Therapy Interventions: balance training, bed mobility training, gait training, home exercise program, patient/family education, ROM (Range of Motion), stair training, strengthening  PT Frequency: daily             Outcome Measures       03/16/17 1201 03/15/17 1400 03/15/17 1200    How much help from another person do you currently need...    Turning from your back to your side while in flat bed without using bedrails? 3  -MQ  3  -CH    Moving from lying on back to sitting on the side of a flat bed without bedrails? 3  -MQ  2  -CH    Moving to and from a bed to a chair (including a wheelchair)? 3  -MQ  3  -CH    Standing up from a chair using your arms (e.g., wheelchair, bedside chair)? 3  -MQ  3  -CH    Climbing 3-5 steps with a railing? 2  -MQ  2  -CH    To walk in hospital room? 3  -MQ  3  -CH    AM-PAC 6 Clicks Score 17  -MQ  16  -CH    How much help from another is currently needed...    Putting on and taking off regular lower body clothing?  2  -AF     Bathing (including washing, rinsing, and drying)  3  -AF     Toileting (which includes using toilet bed pan or urinal)  3  -AF     Putting on and taking off  regular upper body clothing  3  -AF     Taking care of personal grooming (such as brushing teeth)  3  -AF     Eating meals  2  -AF     Score  16  -AF     Functional Assessment    Outcome Measure Options AM-PAC 6 Clicks Basic Mobility (PT)  -  AM-PAC 6 Clicks Basic Mobility (PT)  -      03/14/17 9907          How much help from another is currently needed...    Putting on and taking off regular lower body clothing? 3  -AF      Bathing (including washing, rinsing, and drying) 3  -AF      Toileting (which includes using toilet bed pan or urinal) 3  -AF      Putting on and taking off regular upper body clothing 3  -AF      Taking care of personal grooming (such as brushing teeth) 3  -AF      Eating meals 3  -AF      Score 18  -AF      Functional Assessment    Outcome Measure Options AM-PAC 6 Clicks Daily Activity (OT)  -AF        User Key  (r) = Recorded By, (t) = Taken By, (c) = Cosigned By    Initials Name Provider Type     Dorothy Stanford, PT Physical Therapist    AF Saritha Sherman, OTR Occupational Therapist     Pamela Monterroso, PT Physical Therapist           Time Calculation:           PT G-Codes  Outcome Measure Options: AM-PAC 6 Clicks Basic Mobility (PT)    PT Discharge Summary  Anticipated Discharge Disposition: (P) skilled nursing facility  Reason for Discharge: (P) Discharge from facility  Outcomes Achieved: (P) Refer to plan of care for updates on goals achieved  Discharge Destination: (P) SNF    Reagan Bowman, PT Student  3/17/2017

## 2017-03-17 NOTE — PROGRESS NOTES
Doing ok, no complaints  CT this am reviewed-no change in the SD[facility]    AVSS  AA,Johny person, hospital, president  PIERRE well  Breathing not labored    ..    Results from last 7 days  Lab Units 03/15/17  0910   WBC 10*3/mm3 9.68   HEMOGLOBIN g/dL 9.0*   HEMATOCRIT % 25.9*   PLATELETS 10*3/mm3 177     ..    Results from last 7 days  Lab Units 03/16/17  0308   INR  1.48*       S/p fall about a month ago  Afib, hx of stroke, CABG  Coumadin toxicity  L>R SDH    Can go to rehab.  Dr. Granados is not ok with coumadin yet but ok with Eliquis or Xarelto since they can be reversed much quicker than the coumadin, less likely to get toxic.  Per RN she has called cardiology and pt is going on Eliquis bid.  We will set up a rpt CT and appt in 2wks. Call sooner with any neuro changes or questions or concerns.

## 2017-03-17 NOTE — PROGRESS NOTES
Continued Stay Note  [facility]     Patient Name: Aime Hernandez  MRN: [id number]  Today's Date: 3/17/2017    Admit Date: 3/12/2017          Discharge Plan       03/17/17 1034    Case Management/Social Work Plan    Plan [facility]    Patient/Family In Agreement With Plan yes    Additional Comments Spoke with Pamela from [facility] they do not have any beds available today for dc. Spoke with Geovani with [facility] bed is available today and patient has been accepted.. CCP called and left message for pts daughter Flor requested call back. CCP called pts wife and spoke with pts daughter and she agreed for [facility] and believes when she spoke with Geovani yesterday that they provide pickup from hospital. CCP called Geovani and left vm pt to be discharged to [facility] today and requested call back. [facility]/CCP              Discharge Codes     None        Expected Discharge Date and Time     Expected Discharge Date Expected Discharge Time    Mar 17, 2017             Karo Whelan, RN

## 2017-03-28 PROBLEM — R33.9 URINARY RETENTION: Status: ACTIVE | Noted: 2017-01-01

## 2017-03-28 PROBLEM — G93.41 METABOLIC ENCEPHALOPATHY: Status: ACTIVE | Noted: 2017-01-01

## 2017-03-28 PROBLEM — Z97.8 CHRONIC INDWELLING FOLEY CATHETER: Status: ACTIVE | Noted: 2017-01-01

## 2017-03-28 PROBLEM — N30.01 ACUTE CYSTITIS WITH HEMATURIA: Status: ACTIVE | Noted: 2017-01-01

## 2017-03-29 PROBLEM — R19.7 DIARRHEA: Status: ACTIVE | Noted: 2017-01-01

## 2017-04-01 NOTE — PROGRESS NOTES
Patient Identification:  NAME:  Aime Hernandez  Age:  72 y.o.   Sex:  male   :  1945   MRN:  [id number]       Chief complaint: Subdural hematoma metabolic encephalopathy    History of present illness:  More alert today doing well but is able to converse with me some comprehend name and repeat he can count fingers and laughs a little.      Past medical history:  Past Medical History:   Diagnosis Date   • Atrial fibrillation    • B12 deficiency    • Carotid artery disease    • Coronary artery disease    • Depression    • Diabetes mellitus    • History of thrombocytopenia    • Hypercholesteremia    • Hyperlipidemia    • Hypertension    • Old myocardial infarct 2015   • Stroke    • Subdural hematoma, acute 2017   • Vision loss, left eye        Allergies:  No known drug allergy    Home medications:  Prescriptions Prior to Admission   Medication Sig Dispense Refill Last Dose   • acetaminophen (TYLENOL) 325 MG tablet Take 650 mg by mouth Every 6 (Six) Hours As Needed for mild pain (1-3).   Unknown at Unknown time   • apixaban (ELIQUIS) 5 MG tablet tablet Take 1 tablet by mouth 2 (Two) Times a Day. 60 tablet     • atorvastatin (LIPITOR) 20 MG tablet Take 20 mg by mouth daily.   Unknown at Unknown time   • B-D ULTRAFINE III SHORT PEN 31G X 8 MM misc    Unknown at Unknown time   • Bioflavonoid Products (VITAMIN C PLUS PO) Take  by mouth Daily.   Taking   • digoxin (LANOXIN) 125 MCG tablet Take 1 tablet by mouth Daily.      • insulin glargine (LANTUS) 100 UNIT/ML injection Inject 20 Units under the skin daily.   Unknown at Unknown time   • metFORMIN (GLUCOPHAGE) 1000 MG tablet 1,000 mg Daily With Breakfast.   Unknown at Unknown time   • metoprolol tartrate (LOPRESSOR) 50 MG tablet Take 1 tablet by mouth Every 8 (Eight) Hours.      • nitroglycerin (NITROSTAT) 0.4 MG SL tablet Place 1 tablet under the tongue every 5 (five) minutes as needed for chest pain. 25 tablet 3 Unknown at Unknown time   • QUEtiapine  (SEROquel) 25 MG tablet Take 1 tablet by mouth Every 12 (Twelve) Hours.      • VITAMIN E PO Take  by mouth Daily.   Unknown at Unknown time        Hospital medications:    ampicillin 500 mg Intravenous Q6H   atorvastatin 20 mg Oral Daily   digoxin 125 mcg Oral Daily   insulin aspart 0-9 Units Subcutaneous 4x Daily AC & at Bedtime   insulin detemir 10 Units Subcutaneous Nightly   metoprolol tartrate 50 mg Oral Q8H        •  acetaminophen  •  dextrose  •  dextrose  •  glucagon (human recombinant)  •  metoprolol  •  nitroglycerin  •  ondansetron **OR** ondansetron ODT **OR** ondansetron  •  sodium chloride  •  Insert peripheral IV **AND** sodium chloride  •  ziprasidone      Objective:  Vitals Ranges:   Temp:  [97.3 °F (36.3 °C)-98.4 °F (36.9 °C)] 98.4 °F (36.9 °C)  Heart Rate:  [68-96] 96  Resp:  [18] 18  BP: (114-155)/(70-98) 114/70      Physical Exam:  Much more awake alert very calm able to comprehend name and repeat decreased right nasolabial fold decreased hearing but definitely able to say a few things and repeat.  Fair  strength bilaterally very poor effort    Results review:   I reviewed the patient's new clinical results.    Data review:  Lab Results (last 24 hours)     Procedure Component Value Units Date/Time    Blood Culture [[id number]]  (Normal) Collected:  03/28/17 1436    Specimen:  Blood from Arm, Left Updated:  03/31/17 1501     Blood Culture No growth at 3 days    POC Glucose Fingerstick [[id number]]  (Abnormal) Collected:  03/31/17 1629    Specimen:  Blood Updated:  03/31/17 1633     Glucose 181 (H) mg/dL     Narrative:       Meter: [id number] : [id number] Guanakito Gibson    Blood Culture [[id number]]  (Normal) Collected:  03/28/17 1638    Specimen:  Blood from Hand, Right Updated:  03/31/17 1701     Blood Culture No growth at 3 days    POC Glucose Fingerstick [[id number]]  (Abnormal) Collected:  03/31/17 2028    Specimen:  Blood Updated:  03/31/17 2030     Glucose 204 (H) mg/dL     Narrative:        Meter: [id number] : [id number] Sales Ni    POC Glucose Fingerstick [[id number]]  (Abnormal) Collected:  03/31/17 2221    Specimen:  Blood Updated:  03/31/17 2223     Glucose 187 (H) mg/dL     Narrative:       Meter: [id number] : [id number] Bedpkle Kiarra    CBC (No Diff) [[id number]]  (Abnormal) Collected:  04/01/17 0434    Specimen:  Blood Updated:  04/01/17 0544     WBC 9.90 10*3/mm3      RBC 3.63 (L) 10*6/mm3      Hemoglobin 11.1 (L) g/dL      Hematocrit 34.3 (L) %      MCV 94.5 fL      MCH 30.6 pg      MCHC 32.4 (L) g/dL      RDW 15.4 (H) %      RDW-SD 52.9 fl      MPV 9.4 fL      Platelets 278 10*3/mm3     Basic Metabolic Panel [[id number]]  (Abnormal) Collected:  04/01/17 0434    Specimen:  Blood Updated:  04/01/17 0609     Glucose 169 (H) mg/dL      BUN 6 (L) mg/dL      Creatinine 0.64 (L) mg/dL      Sodium 148 (H) mmol/L      Potassium 3.5 mmol/L      Chloride 108 (H) mmol/L      CO2 23.5 mmol/L      Calcium 8.3 (L) mg/dL      eGFR Non African Amer 122 mL/min/1.73      BUN/Creatinine Ratio 9.4     Anion Gap 16.5 mmol/L     Narrative:       The MDRD GFR formula is only valid for adults with stable renal function between ages 18 and 70.    POC Glucose Fingerstick [[id number]]  (Abnormal) Collected:  04/01/17 0625    Specimen:  Blood Updated:  04/01/17 0626     Glucose 156 (H) mg/dL     Narrative:       Meter: [id number] : [id number] Sales Ni    POC Glucose Fingerstick [[id number]]  (Abnormal) Collected:  04/01/17 0811    Specimen:  Blood Updated:  04/01/17 0812     Glucose 181 (H) mg/dL     Narrative:       Meter: [id number] : [id number] Beduerle Kiarra    POC Glucose Fingerstick [[id number]]  (Abnormal) Collected:  04/01/17 1103    Specimen:  Blood Updated:  04/01/17 1104     Glucose 165 (H) mg/dL     Narrative:       Meter: [id number] : [id number] Radu Alonzo           Imaging:  Imaging Results (last 24 hours)     ** No results found for the last 24 hours. **              Assessment and Plan:     Active Problems:    B12 deficiency    Thrombocytopenia    Carotid artery stenosis    CAD (coronary artery disease)    Chronic a-fib    DM type 2 (diabetes mellitus, type 2)    Acute cystitis with hematuria    Urinary retention    Chronic indwelling Menjivar catheter    Metabolic encephalopathy    Diarrhea    He is much better today he is calm he is confused but he is able to actually converse with me comprehend name and repeat and give me good .  We are definitely going the right direction no evidence of seizure activity and I have noted that the Keppra was DC'd      Jorge Garcia MD  04/01/17  1:09 PM

## 2017-04-01 NOTE — PLAN OF CARE
Problem: Patient Care Overview (Adult)  Goal: Plan of Care Review  Outcome: Ongoing (interventions implemented as appropriate)    03/31/17 0322 04/01/17 0515   Patient Care Overview   Progress no change --    Outcome Evaluation   Outcome Summary/Follow up Plan --  Pt show no nonverbal signs of pain/discomfort. Pt remains confused and in soft wrist restraints. Pt is very agitated/restless and trying to pull at lines most of the time but can sometimes be cooperative with simple commands. VSS. Continue to monitor. Safety maintained.    Coping/Psychosocial Response Interventions   Plan Of Care Reviewed With --  patient         Problem: Fall Risk (Adult)  Goal: Absence of Falls  Outcome: Ongoing (interventions implemented as appropriate)    Problem: SAFETY - NON-VIOLENT RESTRAINT  Goal: Remains free of injury from restraints (Non-Violent Restraint)  Outcome: Ongoing (interventions implemented as appropriate)  Goal: Free from restraint(s) (Non-Violent Restraint)  Outcome: Ongoing (interventions implemented as appropriate)    Problem: Infection, Risk/Actual (Adult)  Goal: Infection Prevention/Resolution  Outcome: Ongoing (interventions implemented as appropriate)    Problem: Confusion, Acute (Adult)  Goal: Cognitive/Functional Impairments Minimized  Outcome: Ongoing (interventions implemented as appropriate)  Goal: Safety  Outcome: Ongoing (interventions implemented as appropriate)

## 2017-04-01 NOTE — PLAN OF CARE
Problem: Patient Care Overview (Adult)  Goal: Plan of Care Review  Outcome: Ongoing (interventions implemented as appropriate)  Goal: Adult Individualization and Mutuality  Outcome: Ongoing (interventions implemented as appropriate)  Goal: Discharge Needs Assessment  Outcome: Ongoing (interventions implemented as appropriate)    Problem: Fall Risk (Adult)  Goal: Absence of Falls  Outcome: Ongoing (interventions implemented as appropriate)    Problem: SAFETY - NON-VIOLENT RESTRAINT  Goal: Remains free of injury from restraints (Non-Violent Restraint)  Outcome: Ongoing (interventions implemented as appropriate)  Goal: Free from restraint(s) (Non-Violent Restraint)  Outcome: Ongoing (interventions implemented as appropriate)    Problem: Infection, Risk/Actual (Adult)  Goal: Infection Prevention/Resolution  Outcome: Ongoing (interventions implemented as appropriate)    Problem: Confusion, Acute (Adult)  Goal: Cognitive/Functional Impairments Minimized  Outcome: Ongoing (interventions implemented as appropriate)  Goal: Safety  Outcome: Ongoing (interventions implemented as appropriate)

## 2017-04-01 NOTE — PLAN OF CARE
Problem: SAFETY - NON-VIOLENT RESTRAINT  Goal: Remains free of injury from restraints (Non-Violent Restraint)  Outcome: Ongoing (interventions implemented as appropriate)  Goal: Free from restraint(s) (Non-Violent Restraint)  Outcome: Ongoing (interventions implemented as appropriate)    Problem: Infection, Risk/Actual (Adult)  Goal: Infection Prevention/Resolution  Outcome: Ongoing (interventions implemented as appropriate)

## 2017-04-01 NOTE — PROGRESS NOTES
"                    [facility]     LOS: 4 days     Name: Aime Hernandez  Age: 72 y.o.  Sex: male  :  1945  MRN: [id number]         Primary Care Physician: JANIE Urena    Subjective   Discussed with RN. Restrained. Stopped Keppra because of some reported lethargy this morning. Currently is a little lethargic but according to nurse has been restless and agitated at times.    Objective   Body mass index is 25.78 kg/(m^2).  Diet Dysphagia; II - Pureed; Thin; Cardiac, Consistent Carbohydrate    Objective:  General Appearance:  Comfortable and in no acute distress (in wrist restraints).    Vital signs: (most recent): Blood pressure 127/67, pulse 97, temperature 97.8 °F (36.6 °C), temperature source Oral, resp. rate 18, height 72\" (182.9 cm), weight 190 lb 1.6 oz (86.2 kg), SpO2 96 %.    Lungs:  Normal respiratory rate and normal effort.  He is not in respiratory distress.  Breath sounds clear to auscultation.    Heart: Normal rate.  Regular rhythm.    Abdomen: Abdomen is soft.  There is no abdominal tenderness.  There is no rebound tenderness.  There is no guarding.     Extremities: There is no dependent edema.    Neurological: (More alert today but still a little lethargic. Difficult to understand what he is saying.).    Skin:  Warm and dry.        Results Review:    Reviewed medications and new clinical results    ampicillin 500 mg Intravenous Q6H   atorvastatin 20 mg Oral Daily   digoxin 125 mcg Oral Daily   insulin aspart 0-9 Units Subcutaneous 4x Daily AC & at Bedtime   insulin detemir 10 Units Subcutaneous Nightly   metoprolol tartrate 50 mg Oral Q8H          Results from last 7 days  Lab Units 17  0434 17  0450 17  1430   WBC 10*3/mm3 9.90 10.23 8.78   HEMOGLOBIN g/dL 11.1* 9.9* 9.7*   PLATELETS 10*3/mm3 278 267 265       Results from last 7 days  Lab Units 17  0434 17  0450 17  1430   SODIUM mmol/L 148* 138 137 "   POTASSIUM mmol/L 3.5 4.1 4.4   CHLORIDE mmol/L 108* 101 100   TOTAL CO2 mmol/L 23.5 21.5* 23.0   BUN mg/dL 6* 11 15   CREATININE mg/dL 0.64* 0.63* 0.70*   CALCIUM mg/dL 8.3* 8.4* 8.3*   GLUCOSE mg/dL 169* 184* 232*     Glucose   Date/Time Value Ref Range Status   04/01/2017 1607 209 (H) 70 - 130 mg/dL Final   04/01/2017 1103 165 (H) 70 - 130 mg/dL Final   04/01/2017 0811 181 (H) 70 - 130 mg/dL Final   04/01/2017 0625 156 (H) 70 - 130 mg/dL Final   03/31/2017 2221 187 (H) 70 - 130 mg/dL Final   03/31/2017 2028 204 (H) 70 - 130 mg/dL Final   03/31/2017 1629 181 (H) 70 - 130 mg/dL Final   03/31/2017 1156 130 70 - 130 mg/dL Final     Estimated Creatinine Clearance: 88.9 mL/min (by C-G formula based on Cr of 0.64).    Lab Results   Component Value Date    URINECX >100,000 CFU/mL Enterococcus faecalis (A) 03/28/2017     Assessment/Plan   Active Hospital Problems (** Indicates Principal Problem)    Diagnosis Date Noted   • Diarrhea [R19.7] 03/29/2017   • Acute cystitis with hematuria [N30.01] 03/28/2017   • Urinary retention [R33.9] 03/28/2017   • Chronic indwelling Menjivar catheter [Z92.89] 03/28/2017   • Metabolic encephalopathy [G93.41] 03/28/2017   • DM type 2 (diabetes mellitus, type 2) [E11.9] 02/13/2017   • Chronic a-fib [I48.2] 02/13/2017   • CAD (coronary artery disease) [I25.10] 02/13/2017   • Carotid artery stenosis [I65.29] 12/15/2016   • Thrombocytopenia [D69.6] 04/25/2016   • B12 deficiency [E53.8] 02/08/2016      Resolved Hospital Problems    Diagnosis Date Noted Date Resolved   No resolved problems to display.     · Encephalopathy per neurology. Continue observation  · Enterococcus UTI on ampicillin  · IVF  · Hold on anticoag until mental status improves    Dakota Newton MD   04/01/17  4:54 PM

## 2017-04-02 NOTE — PLAN OF CARE
Problem: Patient Care Overview (Adult)  Goal: Plan of Care Review    03/31/17 0322 04/02/17 0116   Patient Care Overview   Progress no change --    Outcome Evaluation   Outcome Summary/Follow up Plan --  Pt shows no signs of pain. Pt remains confused with slurred, illogical speech and in soft restraints. Pt is either very agitated /restless, hollering out and trying to pull at lines or he is very tired and somewhat lethargic. Occasionally he will follow simple commands but is disoriented x3. VSS. Continue to monitor. Safety maintianed.    Coping/Psychosocial Response Interventions   Plan Of Care Reviewed With --  patient       Goal: Adult Individualization and Mutuality  Outcome: Ongoing (interventions implemented as appropriate)    Problem: Fall Risk (Adult)  Goal: Absence of Falls  Outcome: Ongoing (interventions implemented as appropriate)    Problem: SAFETY - NON-VIOLENT RESTRAINT  Goal: Remains free of injury from restraints (Non-Violent Restraint)  Outcome: Ongoing (interventions implemented as appropriate)  Goal: Free from restraint(s) (Non-Violent Restraint)  Outcome: Ongoing (interventions implemented as appropriate)    Problem: Infection, Risk/Actual (Adult)  Goal: Infection Prevention/Resolution  Outcome: Ongoing (interventions implemented as appropriate)    Problem: Confusion, Acute (Adult)  Goal: Cognitive/Functional Impairments Minimized  Outcome: Ongoing (interventions implemented as appropriate)  Goal: Safety  Outcome: Ongoing (interventions implemented as appropriate)

## 2017-04-02 NOTE — NURSING NOTE
MRI phone report rec'd from Dr. Gaspar/Radiology. Same called to Dr. Tamayo and then to Dr. Garcia.    Hedy Jiménez RN

## 2017-04-02 NOTE — PROGRESS NOTES
"                    [facility]               [facility]OCIATES     LOS: 5 days     Name: Aime Hernandez  Age: 72 y.o.  Sex: male  :  1945  MRN: [id number]         Primary Care Physician: JANIE Urena    Subjective   Pt still restrained, unable to remove.  Family present, states he is same since Tuesday and not any better despite treatment, symptoms started after he fell. D/W wife and daughter    Objective   Body mass index is 25.78 kg/(m^2).  Diet Dysphagia; II - Pureed; Thin; Cardiac, Consistent Carbohydrate    Objective:  General Appearance:  Comfortable and in no acute distress (in wrist restraints).    Vital signs: (most recent): Blood pressure 157/90, pulse 86, temperature 97.9 °F (36.6 °C), temperature source Oral, resp. rate 20, height 72\" (182.9 cm), weight 190 lb 1.6 oz (86.2 kg), SpO2 91 %.    Lungs:  Normal respiratory rate and normal effort.  He is not in respiratory distress.  Breath sounds clear to auscultation.    Heart: Normal rate.  Regular rhythm.    Abdomen: Abdomen is soft.  There is no abdominal tenderness.  There is no rebound tenderness.  There is no guarding.     Extremities: There is no dependent edema.    Neurological: (Alert but not oriented. Difficult to understand what he is saying. Refusing to answer questions but does say his name  ).    Skin:  Warm and dry.        Results Review:    Reviewed medications and new clinical results    ampicillin 500 mg Intravenous Q6H   atorvastatin 20 mg Oral Daily   digoxin 125 mcg Oral Daily   insulin aspart 0-9 Units Subcutaneous 4x Daily AC & at Bedtime   insulin detemir 10 Units Subcutaneous Nightly   metoprolol tartrate 50 mg Oral Q8H       sodium chloride 75 mL/hr Last Rate: 75 mL/hr (17 0535)       Results from last 7 days  Lab Units 17  0434 17  0450 17  1430   WBC 10*3/mm3 9.90 10.23 8.78   HEMOGLOBIN g/dL 11.1* 9.9* 9.7*   PLATELETS 10*3/mm3 278 267 265       Results from last 7 days  Lab " Units 04/02/17  0548 04/01/17  0434 03/29/17  0450 03/28/17  1430   SODIUM mmol/L 144 148* 138 137   POTASSIUM mmol/L 3.0* 3.5 4.1 4.4   CHLORIDE mmol/L 106 108* 101 100   TOTAL CO2 mmol/L 27.6 23.5 21.5* 23.0   BUN mg/dL 8 6* 11 15   CREATININE mg/dL 0.64* 0.64* 0.63* 0.70*   CALCIUM mg/dL 8.4* 8.3* 8.4* 8.3*   GLUCOSE mg/dL 201* 169* 184* 232*     Glucose   Date/Time Value Ref Range Status   04/02/2017 1101 180 (H) 70 - 130 mg/dL Final   04/02/2017 0628 202 (H) 70 - 130 mg/dL Final   04/01/2017 2028 216 (H) 70 - 130 mg/dL Final   04/01/2017 1607 209 (H) 70 - 130 mg/dL Final   04/01/2017 1103 165 (H) 70 - 130 mg/dL Final   04/01/2017 0811 181 (H) 70 - 130 mg/dL Final   04/01/2017 0625 156 (H) 70 - 130 mg/dL Final   03/31/2017 2221 187 (H) 70 - 130 mg/dL Final     Estimated Creatinine Clearance: 88.9 mL/min (by C-G formula based on Cr of 0.64).    Lab Results   Component Value Date    URINECX >100,000 CFU/mL Enterococcus faecalis (A) 03/28/2017     Assessment/Plan   Active Hospital Problems (** Indicates Principal Problem)    Diagnosis Date Noted   • Diarrhea [R19.7] 03/29/2017   • Acute cystitis with hematuria [N30.01] 03/28/2017   • Urinary retention [R33.9] 03/28/2017   • Chronic indwelling Menjivar catheter [Z92.89] 03/28/2017   • Metabolic encephalopathy [G93.41] 03/28/2017   • DM type 2 (diabetes mellitus, type 2) [E11.9] 02/13/2017   • Chronic a-fib [I48.2] 02/13/2017   • CAD (coronary artery disease) [I25.10] 02/13/2017   • Carotid artery stenosis [I65.29] 12/15/2016   • Thrombocytopenia [D69.6] 04/25/2016   • B12 deficiency [E53.8] 02/08/2016      Resolved Hospital Problems    Diagnosis Date Noted Date Resolved   No resolved problems to display.     · Encephalopathy per neurology. Per family not better. Since not better with treatment of UTI will check MRI head today, Continue observation  · Enterococcus UTI on ampicillin  · Replace K  · Follow up BS, on ssi and levemir 10units, likely needs higher dose but  wait until Mental status improves  · IVF with 1/2NS but change to NS today  · Hold on anticoag until mental status improves    Franco Tamayo MD   04/02/17  11:03 AM

## 2017-04-02 NOTE — PROGRESS NOTES
Patient Identification:  NAME:  Aime Hernandez  Age:  72 y.o.   Sex:  male   :  1945   MRN:  [id number]       Chief complaint: Metabolic encephalopathy    History of present illness:  He is doing about the same may be a bit more lethargic than yesterday but definitely much much more calm than he was a few days ago he still in some restraint but is sleepy and calm.    Past medical history:  Past Medical History:   Diagnosis Date   • Atrial fibrillation    • B12 deficiency    • Carotid artery disease    • Coronary artery disease    • Depression    • Diabetes mellitus    • History of thrombocytopenia    • Hypercholesteremia    • Hyperlipidemia    • Hypertension    • Old myocardial infarct 2015   • Stroke    • Subdural hematoma, acute 2017   • Vision loss, left eye        Allergies:  No known drug allergy    Home medications:  Prescriptions Prior to Admission   Medication Sig Dispense Refill Last Dose   • acetaminophen (TYLENOL) 325 MG tablet Take 650 mg by mouth Every 6 (Six) Hours As Needed for mild pain (1-3).   Unknown at Unknown time   • apixaban (ELIQUIS) 5 MG tablet tablet Take 1 tablet by mouth 2 (Two) Times a Day. 60 tablet     • atorvastatin (LIPITOR) 20 MG tablet Take 20 mg by mouth daily.   Unknown at Unknown time   • B-D ULTRAFINE III SHORT PEN 31G X 8 MM misc    Unknown at Unknown time   • Bioflavonoid Products (VITAMIN C PLUS PO) Take  by mouth Daily.   Taking   • digoxin (LANOXIN) 125 MCG tablet Take 1 tablet by mouth Daily.      • insulin glargine (LANTUS) 100 UNIT/ML injection Inject 20 Units under the skin daily.   Unknown at Unknown time   • metFORMIN (GLUCOPHAGE) 1000 MG tablet 1,000 mg Daily With Breakfast.   Unknown at Unknown time   • metoprolol tartrate (LOPRESSOR) 50 MG tablet Take 1 tablet by mouth Every 8 (Eight) Hours.      • nitroglycerin (NITROSTAT) 0.4 MG SL tablet Place 1 tablet under the tongue every 5 (five) minutes as needed for chest pain. 25 tablet 3 Unknown at  Unknown time   • QUEtiapine (SEROquel) 25 MG tablet Take 1 tablet by mouth Every 12 (Twelve) Hours.      • VITAMIN E PO Take  by mouth Daily.   Unknown at Unknown time        Hospital medications:    ampicillin 500 mg Intravenous Q6H   atorvastatin 20 mg Oral Daily   digoxin 125 mcg Oral Daily   insulin aspart 0-9 Units Subcutaneous 4x Daily AC & at Bedtime   insulin detemir 10 Units Subcutaneous Nightly   metoprolol tartrate 50 mg Oral Q8H       sodium chloride 75 mL/hr Last Rate: Stopped (04/02/17 1445)     •  acetaminophen  •  dextrose  •  dextrose  •  glucagon (human recombinant)  •  metoprolol  •  nitroglycerin  •  ondansetron **OR** ondansetron ODT **OR** ondansetron  •  potassium chloride **OR** potassium chloride **OR** potassium chloride  •  sodium chloride  •  Insert peripheral IV **AND** sodium chloride  •  ziprasidone      Objective:  Vitals Ranges:   Temp:  [97.9 °F (36.6 °C)-98.6 °F (37 °C)] 98.5 °F (36.9 °C)  Heart Rate:  [] 87  Resp:  [18-20] 20  BP: (127-165)/(67-98) 159/98      Physical Exam:  Lethargic does awaken to say if these words and falls back to sleep still decreased right nasolabial fold but otherwise symmetrical cranial nerves equal tone in the upper externally is toes downgoing bilaterally    Results review:   I reviewed the patient's new clinical results.    Data review:  Lab Results (last 24 hours)     Procedure Component Value Units Date/Time    POC Glucose Fingerstick [[id number]]  (Abnormal) Collected:  04/01/17 1607    Specimen:  Blood Updated:  04/01/17 1609     Glucose 209 (H) mg/dL     Narrative:       Meter: [id number] : [id number] Radu Alonzo    Blood Culture [[id number]]  (Normal) Collected:  03/28/17 1638    Specimen:  Blood from Hand, Right Updated:  04/01/17 1701     Blood Culture No growth at 4 days    Ammonia [[id number]]  (Normal) Collected:  04/01/17 1713    Specimen:  Blood Updated:  04/01/17 1847     Ammonia 28 umol/L     POC Glucose Fingerstick [[id number]]   (Abnormal) Collected:  04/01/17 2028    Specimen:  Blood Updated:  04/01/17 2035     Glucose 216 (H) mg/dL     Narrative:       Meter: [id number] : [id number] Mónica Dan    POC Glucose Fingerstick [[id number]]  (Abnormal) Collected:  04/02/17 0628    Specimen:  Blood Updated:  04/02/17 0629     Glucose 202 (H) mg/dL     Narrative:       Meter: [id number] : [id number] Mónica Hillmantany    Basic Metabolic Panel [[id number]]  (Abnormal) Collected:  04/02/17 0548    Specimen:  Blood Updated:  04/02/17 0702     Glucose 201 (H) mg/dL      BUN 8 mg/dL      Creatinine 0.64 (L) mg/dL      Sodium 144 mmol/L      Potassium 3.0 (L) mmol/L      Chloride 106 mmol/L      CO2 27.6 mmol/L      Calcium 8.4 (L) mg/dL      eGFR Non African Amer 122 mL/min/1.73      BUN/Creatinine Ratio 12.5     Anion Gap 10.4 mmol/L     Narrative:       The MDRD GFR formula is only valid for adults with stable renal function between ages 18 and 70.    POC Glucose Fingerstick [[id number]]  (Abnormal) Collected:  04/02/17 1101    Specimen:  Blood Updated:  04/02/17 1102     Glucose 180 (H) mg/dL     Narrative:       Meter: [id number] : [id number] Miguel A ARMENTA    Blood Culture [[id number]]  (Normal) Collected:  03/28/17 1436    Specimen:  Blood from Arm, Left Updated:  04/02/17 1501     Blood Culture No growth at 5 days           Imaging:  Imaging Results (last 24 hours)     ** No results found for the last 24 hours. **             Assessment and Plan:     Active Problems:    B12 deficiency    Thrombocytopenia    Carotid artery stenosis    CAD (coronary artery disease)    Chronic a-fib    DM type 2 (diabetes mellitus, type 2)    Acute cystitis with hematuria    Urinary retention    Chronic indwelling Menjivar catheter    Metabolic encephalopathy    Diarrhea    The patient has been more alert and more calm but today does appear to be a bit more lethargic we will be checking additional diagnostic testing in the form of an MRI of the brain.    I have  told the family that I do not believe he has had a true stroke.  He does have a left hemisphere subdural hematoma which according to the last CAT scan did show some slight decrease in size.  Again the MRI will be very beneficial for determining how big the subdural hematoma is 2.    Jorge Garcia MD  04/02/17  3:21 PM

## 2017-04-02 NOTE — NURSING NOTE
Pt was taken out of restraints while brief changed and cleaned up. He was immediately pulling at lines/wires, digging at skin, pulling at staff clothing and trying to climb out of bed. He was redirected multiple times will no change. Restraints were reapplied per protocol.

## 2017-04-02 NOTE — NURSING NOTE
Call placed to patient's wife to let her know patient will be transferring to neuro unit.  Left message for her to call me back when she gets message.      Hedy Jiménez RN

## 2017-04-03 NOTE — SIGNIFICANT NOTE
04/03/17 1113   Rehab Treatment   Discipline speech language pathologist   Rehab Evaluation   Evaluation Not Performed patient unavailable for evaluation  (off floor at MRI)

## 2017-04-03 NOTE — PLAN OF CARE
Problem: SAFETY - NON-VIOLENT RESTRAINT  Goal: Free from restraint(s) (Non-Violent Restraint)  Outcome: Ongoing (interventions implemented as appropriate)  Patient was taken out of restraints during a incont. Brief change. Patient immediately attempted to pull at anything he could get his hands on, and was struggling to follow simple commands. Will continue to monitor.

## 2017-04-03 NOTE — SIGNIFICANT NOTE
04/03/17 1448   Rehab Treatment   Discipline physical therapy assistant   Treatment Not Performed other (see comments)  (pt w/ increased fatigue this pm. Would not arouse to stimuli from PT or RN. Will follow-up tomorrow.)   Recommendation   PT - Next Appointment 04/04/17

## 2017-04-03 NOTE — PROGRESS NOTES
Continued Stay Note  [facility]     Patient Name: Aime Hernandez  MRN: [id number]  Today's Date: 4/3/2017    Admit Date: 3/28/2017          Discharge Plan       04/03/17 1419    Case Management/Social Work Plan    Additional Comments Sheridan Kingstonjanice menchaca to follow for transfer when medically ready;  Restraints removed today at 0830.  Will follow.                Discharge Codes     None        Expected Discharge Date and Time     Expected Discharge Date Expected Discharge Time    Mar 31, 2017             Aleja Williamson RN

## 2017-04-03 NOTE — SIGNIFICANT NOTE
04/03/17 1032   Rehab Treatment   Discipline physical therapy assistant   Treatment Not Performed patient unavailable for treatment  (Pt off floor at MRI this am. Will follow-up in pm. )   Recommendation   PT - Next Appointment 04/03/17

## 2017-04-03 NOTE — PLAN OF CARE
Problem: Patient Care Overview (Adult)  Goal: Plan of Care Review  Outcome: Ongoing (interventions implemented as appropriate)    04/03/17 1358   Coping/Psychosocial Response Interventions   Plan Of Care Reviewed With patient         Problem: Inpatient SLP  Goal: Dysphagia- Patient will safely consume diet as per recommendation with no signs/symptoms of aspiration    03/29/17 1145 04/03/17 1358   Safely Consume Diet   Safely Consume Diet- SLP, Date Established 03/29/17 --    Safely Consume Diet- SLP, Time to Achieve by discharge --    Safely Consume Diet- SLP, Additional Goal --  tolerate return to po   Safely Consume Diet- SLP, Outcome --  goal ongoing   Safely Consume Diet- SLP, Reason Goal Not Met --  goals revised this date

## 2017-04-03 NOTE — PROGRESS NOTES
Patient Identification:  NAME:  Aime Hernandez  Age:  72 y.o.   Sex:  male   :  1945   MRN:  [id number]       Chief complaint: Subdural hematoma stroke    History of present illness:  His MRI does indeed show an acute or subacute areas of stroke in the right hemisphere at this point I suspect that is what caused off balance in the fall in the left hemisphere subdural hematomas of which there are 2 of them I performed an independent eyeball review the MRI he is again he get an MRA of the head and neck today but at this point it may not make a big change as we know this general and his thrombocytopenia, mixed blood in several areas on the left side of the brain and acute stroke in the right side of the brain with some areas probable subacute.  I like the daily aspirin for now.  He remains somewhat lethargic but basically is stable neurologically overnight      Past medical history:  Past Medical History:   Diagnosis Date   • Atrial fibrillation    • B12 deficiency    • Carotid artery disease    • Coronary artery disease    • Depression    • Diabetes mellitus    • History of thrombocytopenia    • Hypercholesteremia    • Hyperlipidemia    • Hypertension    • Old myocardial infarct 2015   • Stroke    • Subdural hematoma, acute 2017   • Vision loss, left eye        Allergies:  No known drug allergy    Home medications:  Prescriptions Prior to Admission   Medication Sig Dispense Refill Last Dose   • acetaminophen (TYLENOL) 325 MG tablet Take 650 mg by mouth Every 6 (Six) Hours As Needed for mild pain (1-3).   Unknown at Unknown time   • apixaban (ELIQUIS) 5 MG tablet tablet Take 1 tablet by mouth 2 (Two) Times a Day. 60 tablet     • atorvastatin (LIPITOR) 20 MG tablet Take 20 mg by mouth daily.   Unknown at Unknown time   • B-D ULTRAFINE III SHORT PEN 31G X 8 MM misc    Unknown at Unknown time   • Bioflavonoid Products (VITAMIN C PLUS PO) Take  by mouth Daily.   Taking   • digoxin (LANOXIN) 125 MCG tablet  Take 1 tablet by mouth Daily.      • insulin glargine (LANTUS) 100 UNIT/ML injection Inject 20 Units under the skin daily.   Unknown at Unknown time   • metFORMIN (GLUCOPHAGE) 1000 MG tablet 1,000 mg Daily With Breakfast.   Unknown at Unknown time   • metoprolol tartrate (LOPRESSOR) 50 MG tablet Take 1 tablet by mouth Every 8 (Eight) Hours.      • nitroglycerin (NITROSTAT) 0.4 MG SL tablet Place 1 tablet under the tongue every 5 (five) minutes as needed for chest pain. 25 tablet 3 Unknown at Unknown time   • QUEtiapine (SEROquel) 25 MG tablet Take 1 tablet by mouth Every 12 (Twelve) Hours.      • VITAMIN E PO Take  by mouth Daily.   Unknown at Unknown time        Hospital medications:    ampicillin 500 mg Intravenous Q6H   aspirin 325 mg Oral Daily   Or      aspirin 300 mg Rectal Daily   atorvastatin 80 mg Oral Nightly   digoxin 125 mcg Oral Daily   insulin aspart 0-9 Units Subcutaneous 4x Daily AC & at Bedtime   insulin detemir 10 Units Subcutaneous Nightly   metoprolol tartrate 50 mg Oral Q8H       sodium chloride 75 mL/hr Last Rate: 75 mL/hr (04/02/17 1605)     •  acetaminophen  •  dextrose  •  dextrose  •  glucagon (human recombinant)  •  metoprolol  •  nitroglycerin  •  ondansetron **OR** ondansetron ODT **OR** ondansetron  •  potassium chloride **OR** potassium chloride **OR** potassium chloride  •  sodium chloride  •  sodium chloride  •  Insert peripheral IV **AND** sodium chloride  •  ziprasidone      Objective:  Vitals Ranges:   Temp:  [98 °F (36.7 °C)-98.5 °F (36.9 °C)] 98 °F (36.7 °C)  Heart Rate:  [] 116  Resp:  [20-24] 24  BP: (141-170)/() 141/100      Physical Exam: This lethargic but does eventually awake and in and mumbles a few words to me he is lethargic has decreased left nasolabial fold but his  strength is good particularly on the left side.  No other effort beyond that from him      Results review:   I reviewed the patient's new clinical results.    Data review:  Lab Results  (last 24 hours)     Procedure Component Value Units Date/Time    Blood Culture [[id number]]  (Normal) Collected:  03/28/17 1436    Specimen:  Blood from Arm, Left Updated:  04/02/17 1501     Blood Culture No growth at 5 days    POC Glucose Fingerstick [[id number]]  (Abnormal) Collected:  04/02/17 1615    Specimen:  Blood Updated:  04/02/17 1620     Glucose 149 (H) mg/dL     Narrative:       Meter: [id number] : [id number] Miguel A ARMENTA    Blood Culture [[id number]]  (Normal) Collected:  03/28/17 1638    Specimen:  Blood from Hand, Right Updated:  04/02/17 1701     Blood Culture No growth at 5 days    Digitoxin Level [[id number]]  (Abnormal) Collected:  03/29/17 1704    Specimen:  Blood Updated:  04/02/17 1706     Digitoxin <3.0 (L) ng/mL     Narrative:       Performed at:  [facility] [address]  [id number]  : Isacc Milan MD, Phone:  [phone]    Potassium [[id number]]  (Abnormal) Collected:  04/02/17 1638    Specimen:  Blood Updated:  04/02/17 1723     Potassium 3.3 (L) mmol/L     POC Glucose Fingerstick [[id number]]  (Abnormal) Collected:  04/02/17 2034    Specimen:  Blood Updated:  04/02/17 2037     Glucose 141 (H) mg/dL     Narrative:       Meter: [id number] : [id number] Aamir Arvizu Jr    Hemoglobin A1c [[id number]]  (Abnormal) Collected:  04/03/17 0338    Specimen:  Blood Updated:  04/03/17 0419     Hemoglobin A1C 6.60 (H) %     Narrative:       Hemoglobin A1C Ranges:    Increased Risk for Diabetes  5.7% to 6.4%  Diabetes                     >= 6.5%  Diabetic Goal                < 7.0%    Lipid Panel [[id number]]  (Abnormal) Collected:  04/03/17 0338    Specimen:  Blood Updated:  04/03/17 0435     Total Cholesterol 103 mg/dL      Triglycerides 99 mg/dL      HDL Cholesterol 29 (L) mg/dL      LDL Cholesterol  54 mg/dL      VLDL Cholesterol 19.8 mg/dL      LDL/HDL Ratio 1.87    Narrative:       Cholesterol Reference Ranges  (U.S. Department of Health and Human  Services ATP III Classifications)    Desirable          <200 mg/dL  Borderline High    200-239 mg/dL  High Risk          >240 mg/dL      Triglyceride Reference Ranges  (U.S. Department of Health and Human Services ATP III Classifications)    Normal           <150 mg/dL  Borderline High  150-199 mg/dL  High             200-499 mg/dL  Very High        >500 mg/dL    HDL Reference Ranges  (U.S. Department of Health and Human Services ATP III Classifcations)    Low     <40 mg/dl (major risk factor for CHD)  High    >60 mg/dl ('negative' risk factor for CHD)        LDL Reference Ranges  (U.S. Department of Health and Human Services ATP III Classifcations)    Optimal          <100 mg/dL  Near Optimal     100-129 mg/dL  Borderline High  130-159 mg/dL  High             160-189 mg/dL  Very High        >189 mg/dL    POC Glucose Fingerstick [[id number]]  (Normal) Collected:  04/03/17 0729    Specimen:  Blood Updated:  04/03/17 0729     Glucose 118 mg/dL     Narrative:       Meter: [id number] : [id number] Po Vipul    Potassium [[id number]]  (Abnormal) Collected:  04/03/17 0808    Specimen:  Blood Updated:  04/03/17 0836     Potassium 3.0 (L) mmol/L            Imaging:  Imaging Results (last 24 hours)     Procedure Component Value Units Date/Time    MRI Brain With & Without Contrast [[id number]] Collected:  04/02/17 1813     Updated:  04/02/17 1821    Narrative:       MRI BRAIN WITHOUT AND WITH CONTRAST     HISTORY: Subacute subdural hematoma. Decreased level of consciousness.  Previous stroke.     TECHNIQUE: Brain MRI includes sagittal T1 as well as axial diffusion,  FLAIR, T1, T2, gradient echo sequences. Gadolinium intravenously  followed by axial and coronal T1 weighted sequences.     COMPARISON: Head CT without contrast 03/31/2017, 03/29/2017, 03/28/2017.  Brain MRI without contrast 03/18/2015.     FINDINGS: Mixed signal subacute subdural hematomas are present with the  largest subdural hematoma overlying the  anterolateral left frontal lobe  measuring 9 mm in thickness. Multifocal subdural hematomas are present  extending adjacent to the left parietal, occipital and temporal lobes. A  subdural hematoma along the posterior inferior medial left occipital  lobe measures 1.2 cm in thickness. This extends adjacent to and below a  chronic area of encephalomalacia involving the left parietal occipital  infarction which measures 3.2 x 2.0 cm. Small foci of subdural hematoma  formation is present anterior to the right frontal lobe measuring up to  3-4 mm in thickness.     Within the anterior right frontal lobe cortex, there is a focus of  restricted diffusion which measures 10 x 4 mm consistent with an acute  infarction. There is also a punctate 5 mm focus restricted diffusion  within the right posterior inferior parietal lobe consistent with an  acute lacunar infarction. There is very faint punctate focus of  restricted diffusion within the posterior superior right frontal lobe  subcortical white matter consistent with a subacute infarction.     Chronic encephalomalacia overlying the anterior lateral left frontal  lobe measuring 3.5 x 3.2 cm, consistent with an old left middle cerebral  artery distribution infarction. There are several chronic left frontal  and parietal cortical infarctions. Moderate chronic small vessel  ischemic white matter change is present. There is asymmetry of the  lateral ventricles with the left being larger than the right and this is  chronic.     The major intracranial flow voids appear within normal limits. There is  no abnormal intraparenchymal enhancement.       Impression:       1.  Multifocal subacute subdural hematomas. Largest components of  hematomas are present lateral to the anterior left frontal lobe  measuring 8 mm in thickness and adjacent to the left occipital lobe  measuring 12 mm in thickness. Localized mass effect without shift of the  midline structures.  2. Acute 10 x 4 mm right  frontal lobe cortical infarction. Acute 5 mm  right parietal lobe lacunar infarction. Suspect subacute right posterior  superior frontal lobe lacunar infarction.  3. Chronic areas of encephalomalacia as described are consistent with  old infarctions. Chronic small vessel ischemic white matter disease.     Findings discussed with Hedy, the nurse caring for the patient, on  04/02/2017 at 4:25 PM.     This report was finalized on 4/2/2017 6:18 PM by Dr. Aaron Gaspar MD.       MRI Angiogram Head Without Contrast [[id number]] Resulted:  04/03/17 1149     Updated:  04/03/17 1129    MRI Angiogram Neck With & Without Contrast [[id number]] Resulted:  04/03/17 0927     Updated:  04/03/17 1129             Assessment and Plan:     Active Problems:    B12 deficiency    Thrombocytopenia    Carotid artery stenosis    CAD (coronary artery disease)    Chronic a-fib    DM type 2 (diabetes mellitus, type 2)    Acute cystitis with hematuria    Urinary retention    Chronic indwelling Menjivar catheter    Metabolic encephalopathy    Diarrhea    Acute/subacute stroke in the right hemisphere which I suspect has been present first and was the etiology of the fall which caused the subdural hematoma.  At this point significant further neurologic workup is not going to help as we are between a rock and a hard place.  That is we are definitely already treating him for stroke with daily aspirin therapy.  He has multiple areas of mixed blood subdural hematomas in the left frontal and left occipital region with some subtle edema but no evidence of hydrocephalus.  At this time I will check the MRA of the neck and head that has been performed.  I have performed an independent eyeball review of the MRI scan.      Jorge Garcia MD  04/03/17  11:50 AM

## 2017-04-03 NOTE — PLAN OF CARE
Problem: Patient Care Overview (Adult)  Goal: Plan of Care Review  Outcome: Ongoing (interventions implemented as appropriate)    04/03/17 1804   Patient Care Overview   Progress no change   Outcome Evaluation   Outcome Summary/Follow up Plan pt cooperative today. able to tolerate MRA, no pain noted. vss. pt taken out of restraints this am. doing well. becomes agitated when needs to be changed. pt is still tachy. failed swallow test today. remains NPO. IVF's infusing. will cont. to monitor.    Coping/Psychosocial Response Interventions   Plan Of Care Reviewed With patient       Goal: Adult Individualization and Mutuality  Outcome: Ongoing (interventions implemented as appropriate)  Goal: Discharge Needs Assessment  Outcome: Ongoing (interventions implemented as appropriate)    Problem: Fall Risk (Adult)  Goal: Identify Related Risk Factors and Signs and Symptoms  Outcome: Ongoing (interventions implemented as appropriate)  Goal: Absence of Falls  Outcome: Ongoing (interventions implemented as appropriate)    Problem: Infection, Risk/Actual (Adult)  Goal: Identify Related Risk Factors and Signs and Symptoms  Outcome: Ongoing (interventions implemented as appropriate)  Goal: Infection Prevention/Resolution  Outcome: Ongoing (interventions implemented as appropriate)    Problem: Confusion, Acute (Adult)  Goal: Identify Related Risk Factors and Signs and Symptoms  Outcome: Ongoing (interventions implemented as appropriate)  Goal: Cognitive/Functional Impairments Minimized  Outcome: Ongoing (interventions implemented as appropriate)  Goal: Safety  Outcome: Ongoing (interventions implemented as appropriate)

## 2017-04-03 NOTE — PLAN OF CARE
Problem: Patient Care Overview (Adult)  Goal: Plan of Care Review  Outcome: Ongoing (interventions implemented as appropriate)    04/03/17 0406   Patient Care Overview   Progress no change   Outcome Evaluation   Outcome Summary/Follow up Plan Patient was extremely restless and agitated throughout the night. Unable to complete NIH, VSS though heart rate is slightly tachy and bp is elevated due to NPO status on meds and SBP does not meet parameters for IV. will continue to monitor   Coping/Psychosocial Response Interventions   Plan Of Care Reviewed With patient

## 2017-04-03 NOTE — PROGRESS NOTES
"                    [facility]               ASSOCIATES     LOS: 6 days     Name: Aime Hernandez  Age: 72 y.o.  Sex: male  :  1945  MRN: [id number]         Primary Care Physician: JANIE Urena    Subjective   Trying off restraints  Somnolent today, did receive valium for MRI   D/W wife     Objective   Body mass index is 25.78 kg/(m^2).  NPO Diet    Objective:  General Appearance:  Comfortable, in no acute distress and not in pain (somnolent).    Vital signs: (most recent): Blood pressure (!) 144/103, pulse 109, temperature 98 °F (36.7 °C), temperature source Oral, resp. rate 20, height 72\" (182.9 cm), weight 190 lb 1.6 oz (86.2 kg), SpO2 97 %.    Lungs:  Normal respiratory rate and normal effort.  He is not in respiratory distress.  Breath sounds clear to auscultation.    Heart: Normal rate.  Regular rhythm.    Abdomen: Abdomen is soft.  There is no abdominal tenderness.  There is no rebound tenderness.  There is no guarding.     Extremities: There is no dependent edema.    Neurological: (Somnolent and not oriented. Difficult to understand what he is saying. Refusing to answer questions but does say his name  ).    Skin:  Warm and dry.          Results Review:    Reviewed medications and new clinical results    ampicillin 500 mg Intravenous Q6H   aspirin 325 mg Oral Daily   Or      aspirin 300 mg Rectal Daily   atorvastatin 80 mg Oral Nightly   diazePAM 5 mg Intravenous Once   digoxin 125 mcg Oral Daily   insulin aspart 0-9 Units Subcutaneous 4x Daily AC & at Bedtime   insulin detemir 10 Units Subcutaneous Nightly   metoprolol tartrate 50 mg Oral Q8H       sodium chloride 75 mL/hr Last Rate: 75 mL/hr (17 1605)       Results from last 7 days  Lab Units 17  0434 17  0450 17  1430   WBC 10*3/mm3 9.90 10.23 8.78   HEMOGLOBIN g/dL 11.1* 9.9* 9.7*   PLATELETS 10*3/mm3 278 267 265       Results from last 7 days  Lab Units 17  0808 17  1638 17  0548 " 04/01/17  0434 03/29/17  0450 03/28/17  1430   SODIUM mmol/L  --   --  144 148* 138 137   POTASSIUM mmol/L 3.0* 3.3* 3.0* 3.5 4.1 4.4   CHLORIDE mmol/L  --   --  106 108* 101 100   TOTAL CO2 mmol/L  --   --  27.6 23.5 21.5* 23.0   BUN mg/dL  --   --  8 6* 11 15   CREATININE mg/dL  --   --  0.64* 0.64* 0.63* 0.70*   CALCIUM mg/dL  --   --  8.4* 8.3* 8.4* 8.3*   GLUCOSE mg/dL  --   --  201* 169* 184* 232*     Glucose   Date/Time Value Ref Range Status   04/03/2017 0729 118 70 - 130 mg/dL Final   04/02/2017 2034 141 (H) 70 - 130 mg/dL Final   04/02/2017 1615 149 (H) 70 - 130 mg/dL Final   04/02/2017 1101 180 (H) 70 - 130 mg/dL Final   04/02/2017 0628 202 (H) 70 - 130 mg/dL Final   04/01/2017 2028 216 (H) 70 - 130 mg/dL Final   04/01/2017 1607 209 (H) 70 - 130 mg/dL Final   04/01/2017 1103 165 (H) 70 - 130 mg/dL Final     Estimated Creatinine Clearance: 88.9 mL/min (by C-G formula based on Cr of 0.64).    Lab Results   Component Value Date    URINECX >100,000 CFU/mL Enterococcus faecalis (A) 03/28/2017       MRI Head    IMPRESSION:  1. Multifocal subacute subdural hematomas. Largest components of  hematomas are present lateral to the anterior left frontal lobe  measuring 8 mm in thickness and adjacent to the left occipital lobe  measuring 12 mm in thickness. Localized mass effect without shift of the  midline structures.  2. Acute 10 x 4 mm right frontal lobe cortical infarction. Acute 5 mm  right parietal lobe lacunar infarction. Suspect subacute right posterior  superior frontal lobe lacunar infarction.  3. Chronic areas of encephalomalacia as described are consistent with  old infarctions. Chronic small vessel ischemic white matter disease.  Assessment/Plan   Active Hospital Problems (** Indicates Principal Problem)    Diagnosis Date Noted   • Diarrhea [R19.7] 03/29/2017   • Acute cystitis with hematuria [N30.01] 03/28/2017   • Urinary retention [R33.9] 03/28/2017   • Chronic indwelling Menjivar catheter [Z92.89]  03/28/2017   • Metabolic encephalopathy [G93.41] 03/28/2017   • DM type 2 (diabetes mellitus, type 2) [E11.9] 02/13/2017   • Chronic a-fib [I48.2] 02/13/2017   • CAD (coronary artery disease) [I25.10] 02/13/2017   • Carotid artery stenosis [I65.29] 12/15/2016   • Thrombocytopenia [D69.6] 04/25/2016   • B12 deficiency [E53.8] 02/08/2016      Resolved Hospital Problems    Diagnosis Date Noted Date Resolved   No resolved problems to display.     · Acute right frontal lobe and right parietal lobe infarct, subacute right posterior superior frontal lobe lacunar infarct  · H/o afib not on AC due to previous SDH earlier in the month.  Now with strokes off AC.    · Talk with Neurology whether we can start anticoagulation as likely embolic strokes  · Further stroke work up with MRA and echo  · Encephalopathy per neurology.   · Enterococcus UTI on ampicillin  · Replace K  ·  BS fine, on ssi and levemir 10 units, likely needs higher dose but wait until Mental status improves  · IVF with NS  · Hold on anticoag until cleared by neuro    Franco Tamayo MD   04/03/17  8:55 AM

## 2017-04-03 NOTE — PROGRESS NOTES
Acute Care - Speech Language Pathology   Swallow Re-Evaluation [facility]     Patient Name: Aime Hernandez  : 1945  MRN: [id number]  Today's Date: 4/3/2017  Onset of Illness/Injury or Date of Surgery Date: 17            Admit Date: 3/28/2017    SPEECH-LANGUAGE PATHOLOGY EVALUATION - SWALLOW  Subjective: The patient was seen on this date for a Clinical Swallow evaluation.  Patient was alert and cooperative.   Objective: Textures given included ice chips and thin liquid.  Assessment: Difficulties were noted with ice chips and thin liquid, characterized by lack of labial closure, oral manipulation/transit resulting in weak swallow with wet quality and delayed coughing.  Also of note, pt demonstrated gagging/coughing behavior 10-15 mins after study completed.  When asked if felt as though something coming up pt nodded in affirmation.  SLP Findings:  Patient presents with suspected oropharyngeal dysphagia, with esophageal component.   Recommendations: Diet Textures: NPO.  Medications should be taken by alternate means. \  Recommended Strategies:Oral care  PRN.  Other Recommended Evaluations: Re-evaluation at bedside        Visit Dx:     ICD-10-CM ICD-9-CM   1. Acute cystitis with hematuria N30.01 595.0   2. Toxic metabolic encephalopathy G92 349.82     Patient Active Problem List   Diagnosis   • B12 deficiency   • Thrombocytopenia   • Carotid artery stenosis   • CAD (coronary artery disease)   • Chronic a-fib   • DM type 2 (diabetes mellitus, type 2)   • Acute posthemorrhagic anemia   • Acute cystitis with hematuria   • Urinary retention   • Chronic indwelling Menjivar catheter   • Metabolic encephalopathy   • Diarrhea     Past Medical History:   Diagnosis Date   • Atrial fibrillation    • B12 deficiency    • Carotid artery disease    • Coronary artery disease    • Depression    • Diabetes mellitus    • History of thrombocytopenia    • Hypercholesteremia    • Hyperlipidemia    • Hypertension    • Old  myocardial infarct 03/2015   • Stroke    • Subdural hematoma, acute 03/2017   • Vision loss, left eye      Past Surgical History:   Procedure Laterality Date   • BACK SURGERY     • CAROTID STENT  2015   • CORONARY ARTERY BYPASS GRAFT       x 3   • INGUINAL HERNIA REPAIR     • TONSILLECTOMY AND ADENOIDECTOMY            SWALLOW EVALUATION (last 72 hours)      Swallow Evaluation       04/03/17 1353                Rehab Evaluation    Document Type evaluation  -SA        Symptoms Noted During/After Treatment none  -SA        General Information    Patient Profile Review yes  -SA        Pertinent History Of Current Problem --   BSE 3/29 rec puree/thin  -SA        Current Diet Limitations NPO  -SA        Prior Level of Function- Swallowing diet modifications- foods  -SA        Plans/Goals Discussed With patient  -SA        Clinical Impression    Patient's Goals For Discharge patient could not state  -SA        SLP Swallowing Diagnosis oral dysfunction;pharyngeal dysfunction  -SA        Rehab Potential/Prognosis, Swallowing fair, will monitor progress closely  -SA        Criteria for Skilled Therapeutic Interventions Met skilled criteria for dysphagia intervention met  -SA        Therapy Frequency PRN  -SA        Predicted Duration Therapy Interv (days) until discharge  -SA        SLP Diet Recommendation NPO: unsafe for food/liquid intake  -SA        Recommended Diagnostics reassess via clinical swallow (non-instrumental exam)  -SA        SLP Rec. for Method of Medication Administration meds via alternate route  -SA        Anticipated Discharge Disposition other (see comments)   unknown  -SA        Pain Assessment    Pain Assessment No/denies pain  -SA        Cognitive Assessment/Intervention    Current Cognitive/Communication Assessment impaired  -SA          User Key  (r) = Recorded By, (t) = Taken By, (c) = Cosigned By    Initials Name Effective Dates     Sandrine France MS [facility]-SLP 04/13/15 -         EDUCATION  The  patient has been educated in the following areas:   Dysphagia (Swallowing Impairment) NPO rationale.    SLP Recommendation and Plan  SLP Swallowing Diagnosis: oral dysfunction, pharyngeal dysfunction  SLP Diet Recommendation: NPO: unsafe for food/liquid intake     SLP Rec. for Method of Medication Administration: meds via alternate route     Recommended Diagnostics: reassess via clinical swallow (non-instrumental exam)  Criteria for Skilled Therapeutic Interventions Met: skilled criteria for dysphagia intervention met  Anticipated Discharge Disposition: other (see comments) (unknown)  Rehab Potential/Prognosis, Swallowing: fair, will monitor progress closely  Therapy Frequency: PRN             Plan of Care Review  Plan Of Care Reviewed With: patient          IP SLP Goals       04/03/17 1358 03/29/17 1145       Safely Consume Diet    Safely Consume Diet- SLP, Date Established  03/29/17  -OH     Safely Consume Diet- SLP, Time to Achieve  by discharge  -OH     Safely Consume Diet- SLP, Additional Goal tolerate return to po  -SA      Safely Consume Diet- SLP, Outcome goal ongoing  -SA      Safely Consume Diet- SLP, Reason Goal Not Met goals revised this date  -        User Key  (r) = Recorded By, (t) = Taken By, (c) = Cosigned By    Initials Name Provider Type    SA Sandrine France MS CCC-SLP Speech and Language Pathologist    [place] Evi Maldonado MA,CCC-SLP Speech and Language Pathologist             SLP Outcome Measures (last 72 hours)      SLP Outcome Measures       04/03/17 1359          SLP Outcome Measures    [facility]      FCM Scores    Swallowing FCM Score 1  -        User Key  (r) = Recorded By, (t) = Taken By, (c) = Cosigned By    Initials Name Effective Dates    SA Sandrine France MS CCC-SLP 04/13/15 -            Time Calculation:         Time Calculation- SLP       04/03/17 1359          Time Calculation- SLP    SLP Received On 04/03/17  -        User Key  (r) = Recorded By,  (t) = Taken By, (c) = Cosigned By    Initials Name Provider Type    SA Sandrine France MS CCC-SLP Speech and Language Pathologist          Therapy Charges for Today     Code Description Service Date Service Provider Modifiers Qty    94873858440  ST EVAL ORAL PHARYNG SWALLOW 4 4/3/2017 Sandrine France MS CCC-SLP GN 1               Sandrine France MS CCC-SLP  4/3/2017

## 2017-04-03 NOTE — SIGNIFICANT NOTE
04/03/17 1454   Rehab Treatment   Discipline occupational therapist   Treatment Not Performed other (see comments)  (per nsg, pt. now sleeping and was unable to work with PT, who just attempted to see. 3170)

## 2017-04-04 NOTE — PROGRESS NOTES
Acute Care - Physical Therapy Treatment Note  [facility]     Patient Name: Aime Hernandez  : 1945  MRN: [id number]  Today's Date: 2017  Onset of Illness/Injury or Date of Surgery Date: 17  Date of Referral to PT: 17  Referring Physician: Lamar    Admit Date: 3/28/2017    Visit Dx:    ICD-10-CM ICD-9-CM   1. Acute cystitis with hematuria N30.01 595.0   2. Toxic metabolic encephalopathy G92 349.82     Patient Active Problem List   Diagnosis   • B12 deficiency   • Thrombocytopenia   • Carotid artery stenosis   • CAD (coronary artery disease)   • Chronic a-fib   • DM type 2 (diabetes mellitus, type 2)   • Acute posthemorrhagic anemia   • Acute cystitis with hematuria   • Urinary retention   • Chronic indwelling Menjivar catheter   • Metabolic encephalopathy   • Diarrhea               Adult Rehabilitation Note       17 1000          Rehab Assessment/Intervention    Discipline physical therapy assistant  -RW      Document Type therapy note (daily note)  -RW      Subjective Information agree to therapy  -RW      Patient Effort, Rehab Treatment poor  -RW      Precautions/Limitations fall precautions  -RW      Specific Treatment Considerations Pt easily agitated. Decreased LOC and would not open eyes during tx  -RW      Recorded by [RW] Kirstie Cunningham PTA      Pain Assessment    Pain Assessment Unable to assess  -RW      Recorded by [RW] Kirstie Cunningham PTA      Cognitive Assessment/Intervention    Current Cognitive/Communication Assessment impaired   difficult to understand  -RW      Orientation Status unable/difficult to assess  -RW      Follows Commands/Answers Questions 50% of the time;able to follow single-step instructions;needs cueing  -RW      Personal Safety moderate impairment  -RW      Personal Safety Interventions fall prevention program maintained;nonskid shoes/slippers when out of bed  -RW      Recorded by [RW] Kirstie Cunningham PTA      Bed Mobility, Assessment/Treatment     Bed Mobility, Assistive Device bed rails  -RW      Bed Mobility, Roll Left, [place] verbal cues required;contact guard assist;minimum assist (75% patient effort)  -RW      Bed Mobility, Roll Right, [place] verbal cues required;contact guard assist;minimum assist (75% patient effort)  -RW      Bed Mob, Supine to Sit, [place] moderate assist (50% patient effort);maximum assist (25% patient effort);2 person assist required  -RW      Bed Mob, Sit to Supine, [place] moderate assist (50% patient effort);2 person assist required  -RW      Recorded by [RW] Kirstie Cunningham PTA      Transfer Assessment/Treatment    Transfers, Sit-Stand [place] other (see comments)   attempted standing, but pt resistive   -RW      Recorded by [RW] Kirstie Cunningham PTA      Gait Assessment/Treatment    Gait, [place] Level not appropriate to assess;unable to perform  -RW      Recorded by [RW] Kirstie Cunningham PTA      Balance Skills Training    Sitting-Level of Assistance Contact guard;Minimum assistance;Maximum assistance   initially maxA due to R lean, improved to cga/min  -RW      Sitting-Balance Support Feet supported;Right upper extremity supported;Left upper extremity supported  -RW      Sitting-Balance Activities Forward lean  -RW      Recorded by [RW] Kirstie Cunningham PTA      Positioning and Restraints    Pre-Treatment Position in bed  -RW      Post Treatment Position bed  -RW      In Bed side lying right;call light within reach;encouraged to call for assist;exit alarm on;with nsg   MD present as well  -RW      Recorded by [RW] Kirstie Cunningham PTA        User Key  (r) = Recorded By, (t) = Taken By, (c) = Cosigned By    Initials Name Effective Dates    RW Kirstie Cunningham PTA 04/06/16 -                 IP PT Goals       03/30/17 0929          Bed Mobility PT LTG    Bed Mobility PT LTG, Date Established 03/30/17  -MD      Bed Mobility PT LTG, Time to Achieve 1 wk  -MD      Bed Mobility PT LTG, Activity Type  supine to sit/sit to supine  -MD      Bed Mobility PT LTG, [place] Level minimum assist (75% patient effort)  -MD      Transfer Training PT LTG    Transfer Training PT LTG, Date Established 03/30/17  -MD      Transfer Training PT LTG, Time to Achieve 1 wk  -MD      Transfer Training PT LTG, Activity Type sit to stand/stand to sit  -MD      Transfer Training PT LTG, [place] Level minimum assist (75% patient effort)  -MD      Transfer Training PT LTG, Assist Device walker, rolling  -MD      Gait Training PT LTG    Gait Training Goal PT LTG, Date Established 03/30/17  -MD      Gait Training Goal PT LTG, Time to Achieve 1 wk  -MD      Gait Training Goal PT LTG, [place] Level minimum assist (75% patient effort)  -MD      Gait Training Goal PT LTG, Assist Device walker, rolling  -MD      Gait Training Goal PT LTG, Distance to Achieve 100  -MD        User Key  (r) = Recorded By, (t) = Taken By, (c) = Cosigned By    Initials Name Provider Type    MD Tamar Patel, PT Physical Therapist          Physical Therapy Education     Title: PT OT SLP Therapies (Active)     Topic: Physical Therapy (Active)     Point: Precautions (Active)    Learning Progress Summary    Learner Readiness Method Response Comment Documented by Status   Patient Acceptance E NR  RW 04/04/17 1126 Active    Acceptance E,D NR  MD 03/30/17 0922 Active                      User Key     Initials Effective Dates Name Provider Type Discipline    MD 12/01/15 -  Tamar Patel, PT Physical Therapist PT     04/06/16 -  Kirstie Cunningham PTA Physical Therapy Assistant PT                    PT Recommendation and Plan  Anticipated Equipment Needs At Discharge: gait belt  Anticipated Discharge Disposition: extended care facility  Planned Therapy Interventions: bed mobility training, gait training, patient/family education, transfer training  PT Frequency: 2-3 times/wk  Plan of Care Review  Plan Of Care Reviewed With: patient  Outcome Summary/Follow up Plan: Pt  easier to arouse today, but still lethargic. Would not open eyes or participate much w/ PT. R lean in sitting that improved w/ time.           Outcome Measures       04/04/17 1000          How much help from another person do you currently need...    Turning from your back to your side while in flat bed without using bedrails? 2  -RW      Moving from lying on back to sitting on the side of a flat bed without bedrails? 2  -RW      Moving to and from a bed to a chair (including a wheelchair)? 1  -RW      Standing up from a chair using your arms (e.g., wheelchair, bedside chair)? 1  -RW      Climbing 3-5 steps with a railing? 1  -RW      To walk in hospital room? 1  -RW      AM-PAC 6 Clicks Score 8  -RW      Functional Assessment    Outcome Measure Options AM-PAC 6 Clicks Basic Mobility (PT)  -RW        User Key  (r) = Recorded By, (t) = Taken By, (c) = Cosigned By    Initials Name Provider Type     Kirstie Cunningham PTA Physical Therapy Assistant           Time Calculation:         PT Charges       04/04/17 1122          Time Calculation    Start Time 1010  -RW      Stop Time 1023  -RW      Time Calculation (min) 13 min  -RW      PT Received On 04/04/17  -RW      PT - Next Appointment 04/06/17  -RW        User Key  (r) = Recorded By, (t) = Taken By, (c) = Cosigned By    Initials Name Provider Type    UDAY Cunningham PTA Physical Therapy Assistant          Therapy Charges for Today     Code Description Service Date Service Provider Modifiers Qty    38646984786 HC PT THER PROC EA 15 MIN 4/4/2017 Kirstie Cunningham PTA GP 1    74514402164 HC PT THER SUPP EA 15 MIN 4/4/2017 Kirstie Cunningham PTA GP 1          PT G-Codes  Outcome Measure Options: AM-PAC 6 Clicks Basic Mobility (PT)    Kisrtie Cunningham PTA  4/4/2017

## 2017-04-04 NOTE — PLAN OF CARE
Problem: Patient Care Overview (Adult)  Goal: Plan of Care Review  Outcome: Ongoing (interventions implemented as appropriate)    04/04/17 0857   Patient Care Overview   Progress unable to show any progress toward functional goals   Outcome Evaluation   Outcome Summary/Follow up Plan Patient exhibits confusion/agitation/combativeness. Unable to converse meaningfully with patient at this time.   Coping/Psychosocial Response Interventions   Plan Of Care Reviewed With patient       Goal: Discharge Needs Assessment  Outcome: Ongoing (interventions implemented as appropriate)    Problem: Fall Risk (Adult)  Goal: Identify Related Risk Factors and Signs and Symptoms  Outcome: Ongoing (interventions implemented as appropriate)  Goal: Absence of Falls  Outcome: Ongoing (interventions implemented as appropriate)    Problem: Infection, Risk/Actual (Adult)  Goal: Infection Prevention/Resolution  Outcome: Ongoing (interventions implemented as appropriate)    Problem: Confusion, Acute (Adult)  Goal: Identify Related Risk Factors and Signs and Symptoms  Outcome: Ongoing (interventions implemented as appropriate)  Goal: Cognitive/Functional Impairments Minimized  Outcome: Ongoing (interventions implemented as appropriate)  Goal: Safety  Outcome: Ongoing (interventions implemented as appropriate)

## 2017-04-04 NOTE — PROGRESS NOTES
Continued Stay Note  [facility]     Patient Name: Aime Hernandez  MRN: [id number]  Today's Date: 4/4/2017    Admit Date: 3/28/2017          Discharge Plan       04/04/17 1546    Case Management/Social Work Plan    Additional Comments Patient remains confused today but no restraints.  Will follow for medical stability.              Discharge Codes     None        Expected Discharge Date and Time     Expected Discharge Date Expected Discharge Time    Apr 4, 2017             Aleja Williamson RN

## 2017-04-04 NOTE — PROGRESS NOTES
"                    [facility] [place]               [place]ATES     LOS: 7 days     Name: Aime Hernandez  Age: 72 y.o.  Sex: male  :  1945  MRN: [id number]         Primary Care Physician: JANIE Urena    Subjective     Somnolent again today, did receive valium for MRI, IV ativan for muscle twitching in eye and fosphenytoin  Otherwise doesn't answer questions     D/W DTR    Objective   Body mass index is 25.78 kg/(m^2).  NPO Diet    Objective:  General Appearance:  In no acute distress, not in pain, ill-appearing and uncomfortable (somnolent).    Vital signs: (most recent): Blood pressure 125/82, pulse 100, temperature 97.9 °F (36.6 °C), temperature source Oral, resp. rate 16, height 72\" (182.9 cm), weight 190 lb 1.6 oz (86.2 kg), SpO2 95 %.    Lungs:  Normal respiratory rate and normal effort.  He is not in respiratory distress.  Breath sounds clear to auscultation.    Heart: Normal rate.  Regular rhythm.    Abdomen: Abdomen is soft.  There is no abdominal tenderness.  There is no rebound tenderness.  There is no guarding.     Extremities: There is no dependent edema.    Neurological: (Somnolent and not oriented. Difficult to understand what he is saying. Refusing to answer questions but does say his name  ).    Skin:  Warm and dry.          Results Review:    Reviewed medications and new clinical results    ampicillin 500 mg Intravenous Q6H   aspirin 325 mg Oral Daily   Or      aspirin 300 mg Rectal Daily   atorvastatin 80 mg Oral Nightly   digoxin 125 mcg Oral Daily   insulin aspart 0-9 Units Subcutaneous 4x Daily AC & at Bedtime   insulin detemir 10 Units Subcutaneous Nightly   metoprolol tartrate 50 mg Oral Q8H       dextrose 5 % and sodium chloride 0.9 % 75 mL/hr       Results from last 7 days  Lab Units 17  0559 17  0434 17  0450 17  1430   WBC 10*3/mm3 11.63* 9.90 10.23 8.78   HEMOGLOBIN g/dL 11.1* 11.1* 9.9* 9.7*   PLATELETS 10*3/mm3 223 278 267 265 "       Results from last 7 days  Lab Units 04/04/17  0559 04/03/17  2041 04/03/17  0808 04/02/17  1638 04/02/17  0548 04/01/17  0434 03/29/17  0450 03/28/17  1430   SODIUM mmol/L 148*  --   --   --  144 148* 138 137   POTASSIUM mmol/L 3.2* 3.3* 3.0* 3.3* 3.0* 3.5 4.1 4.4   CHLORIDE mmol/L 109*  --   --   --  106 108* 101 100   TOTAL CO2 mmol/L 21.2*  --   --   --  27.6 23.5 21.5* 23.0   BUN mg/dL 6*  --   --   --  8 6* 11 15   CREATININE mg/dL 0.50*  --   --   --  0.64* 0.64* 0.63* 0.70*   CALCIUM mg/dL 8.1*  --   --   --  8.4* 8.3* 8.4* 8.3*   GLUCOSE mg/dL 156*  --   --   --  201* 169* 184* 232*     Glucose   Date/Time Value Ref Range Status   04/04/2017 0732 138 (H) 70 - 130 mg/dL Final   04/04/2017 0603 125 70 - 130 mg/dL Final   04/03/2017 2036 113 70 - 130 mg/dL Final   04/03/2017 1610 106 70 - 130 mg/dL Final   04/03/2017 0729 118 70 - 130 mg/dL Final   04/02/2017 2034 141 (H) 70 - 130 mg/dL Final   04/02/2017 1615 149 (H) 70 - 130 mg/dL Final   04/02/2017 1101 180 (H) 70 - 130 mg/dL Final     Estimated Creatinine Clearance: 88.9 mL/min (by C-G formula based on Cr of 0.5).    Lab Results   Component Value Date    URINECX >100,000 CFU/mL Enterococcus faecalis (A) 03/28/2017       MRI Head    IMPRESSION:  1. Multifocal subacute subdural hematomas. Largest components of  hematomas are present lateral to the anterior left frontal lobe  measuring 8 mm in thickness and adjacent to the left occipital lobe  measuring 12 mm in thickness. Localized mass effect without shift of the  midline structures.  2. Acute 10 x 4 mm right frontal lobe cortical infarction. Acute 5 mm  right parietal lobe lacunar infarction. Suspect subacute right posterior  superior frontal lobe lacunar infarction.  3. Chronic areas of encephalomalacia as described are consistent with  old infarctions. Chronic small vessel ischemic white matter disease.  Assessment/Plan   Active Hospital Problems (** Indicates Principal Problem)    Diagnosis Date  Noted   • Diarrhea [R19.7] 03/29/2017   • Acute cystitis with hematuria [N30.01] 03/28/2017   • Urinary retention [R33.9] 03/28/2017   • Chronic indwelling Menjivar catheter [Z92.89] 03/28/2017   • Metabolic encephalopathy [G93.41] 03/28/2017   • DM type 2 (diabetes mellitus, type 2) [E11.9] 02/13/2017   • Chronic a-fib [I48.2] 02/13/2017   • CAD (coronary artery disease) [I25.10] 02/13/2017   • Carotid artery stenosis [I65.29] 12/15/2016   • Thrombocytopenia [D69.6] 04/25/2016   • B12 deficiency [E53.8] 02/08/2016      Resolved Hospital Problems    Diagnosis Date Noted Date Resolved   No resolved problems to display.     · More somnolent today, had valium for MRI and reported twitching last night and given IV ativan and fosphenytoin  · D/w Dr. Almeida, CTA head today to eval SDH and left vertebral , d/c fosphenytoin, EEG won't add much, cont asa now and can change to AC when MS improves  · Acute right frontal lobe and right parietal lobe infarct, subacute right posterior superior frontal lobe lacunar infarct  · H/o afib not on AC due to previous SDH earlier in the month.  Now with strokes off AC.    · Enterococcus UTI on ampicillin  · Replace K  ·  BS fine, on ssi and levemir 10 units, likely needs higher dose but wait until Mental status improves  · Add D5 to NS  · Keep NPO, if MS not better will need to start TF but will have trouble keeping Cortrak due to AMS.  D/w dtr and she will think about TF if needed    D/W Dr. Almeida, RN, and daughter    Franco Tamayo MD   04/04/17  9:23 AM

## 2017-04-04 NOTE — PLAN OF CARE
Problem: Patient Care Overview (Adult)  Goal: Plan of Care Review  Outcome: Ongoing (interventions implemented as appropriate)    04/04/17 7628   Patient Care Overview   Progress unable to show any progress toward functional goals   Outcome Evaluation   Outcome Summary/Follow up Plan pt arouses to voice. able to state name location and year. still very angery when jeffery care is done. difficult to follow commands. npo.    Coping/Psychosocial Response Interventions   Plan Of Care Reviewed With patient         Problem: Fall Risk (Adult)  Goal: Identify Related Risk Factors and Signs and Symptoms  Outcome: Ongoing (interventions implemented as appropriate)    Problem: Confusion, Acute (Adult)  Goal: Identify Related Risk Factors and Signs and Symptoms  Outcome: Ongoing (interventions implemented as appropriate)

## 2017-04-04 NOTE — SIGNIFICANT NOTE
"   04/04/17 1352   Rehab Treatment   Discipline occupational therapist   Rehab Evaluation   Evaluation Not Performed patient/family declined evaluation  (Pt. stated \"No not today\" )   Recommendation   OT - Next Appointment 04/05/17     "

## 2017-04-04 NOTE — PROGRESS NOTES
Neuro  Dictated    I dont think this facial movements is Sz.  Looks like hemifacial spasm.    He has had new infarcts in right hem and old infarcts in left hem.  Could represent emboli.    Hard to tell if SDH have changed with MRI compared to CT but I suspect not.    Plan   CTA of head and neck  Possible BEN based on CTA    LISHA Almeida MD       NEUROLOGY CONSULTATION    CONSULTING PHYSICIAN: Olegario Almeida MD     CHIEF COMPLAINT: Confusion.    DIAGNOSIS: Encephalopathy.     HISTORY OF PRESENT ILLNESS: The patient has no significant change clinically. There was a question of seizures last night with twitching in the right face and possibly the right hand. He was loaded with Dilantin because of this. The patient voices no complaints and is rather restless. He did receive Ativan last night because of some twitching of the right side of the face and possibly the right arm, raising the question of seizure. He was also put on phenytoin.     PHYSICAL EXAMINATION:   GENERAL: This is a man who appears stated age in no acute distress. I think he has a right hemifacial spasm. I do not see any twitching of the right arm. The family does not remember (nurse called them) any twitching of the right side of the face.   VITAL SIGNS:   Temperature 97.6, pulse 77, respirations 18, blood pressure 142/63.  NECK: Carotids are equal without bruits.   CARDIAC: Regular rate and rhythm with no significant murmur or gallop.  EXTREMITIES: Not edematous. Pulses are intact.   NEUROLOGIC: He is confused. He will follow simple commands. He will tell me his name. Cranial nerves are intact. There is no convincing focal weakness in the limbs.     DIAGNOSTIC DATA: The MRI does show scattered acute infarcts in the right MCA distribution and subdurals predominantly on the left. CTA shows a possible stenosis versus artifact in the left internal carotid and the left vertebral.     IMPRESSION AND PLAN: Upon admission it was thought that perhaps he had a  septic encephalopathy from a urinary tract infection and that surely could have had a role. These scattered infarcts in the right frontal/parietal lobe could also contribute. The MRA was not definitive; therefore, I am going to get a CTA to clarify the possible findings of the left internal and left vertebral arteries.     The other issue is these subdurals; they look about the same to me, but I am comparing the MRI with previous CTs. Therefore, when we get the CTA we will also get a CT and that will allow us to compare the subdurals directly. If they look bigger, we will get Neurosurgery to see him, but otherwise we will not.     He is on Apixaban but that has been stopped related to the subdurals and is on aspirin at this point, which is of some concern, but he both has infarcts and has subdurals and he has a history of chronic atrial fibrillation. Further decisions after we review the above studies.     Thank you for allowing me to see this patient.

## 2017-04-04 NOTE — PLAN OF CARE
Problem: Patient Care Overview (Adult)  Goal: Plan of Care Review  Outcome: Ongoing (interventions implemented as appropriate)    04/04/17 1126   Outcome Evaluation   Outcome Summary/Follow up Plan Pt easier to arouse today, but still lethargic. Would not open eyes or participate much w/ PT. R lean in sitting that improved w/ time.    Coping/Psychosocial Response Interventions   Plan Of Care Reviewed With patient

## 2017-04-04 NOTE — NURSING NOTE
Reported patient's LOC to Dr. Garcia, specifically that patient is very difficult to arouse, exhibits interaction to noxious stimuli only, making accurate NIHSS assessment difficult. Reported NIHSS assessment findings for this shift change versus previous shift change with notable decline (see flow sheet), again in context of difficulty in completing due to LOC. Reported observed continuous twitching in right eye. T/O issued.

## 2017-04-05 NOTE — PROGRESS NOTES
"                    [facility]               ASSOCIATES     LOS: 8 days     Name: Aime Hernandez  Age: 72 y.o.  Sex: male  :  1945  MRN: [id number]         Primary Care Physician: JANIE Urena    Subjective   Facial twitching again today, witnessed by RN and Dr. Almeida  Somnolent again today, doesn't answer questions   D/W DTR    Objective   Body mass index is 25.78 kg/(m^2).  NPO Diet    Objective:  General Appearance:  In no acute distress, not in pain, ill-appearing and uncomfortable (somnolent).    Vital signs: (most recent): Blood pressure 149/87, pulse 99, temperature 97.5 °F (36.4 °C), temperature source Oral, resp. rate 17, height 72\" (182.9 cm), weight 190 lb 1.6 oz (86.2 kg), SpO2 97 %.    Lungs:  Normal respiratory rate and normal effort.  He is not in respiratory distress.  Breath sounds clear to auscultation.    Heart: Normal rate.  Regular rhythm.    Abdomen: Abdomen is soft.  There is no abdominal tenderness.  There is no rebound tenderness.  There is no guarding.     Extremities: There is no dependent edema.    Neurological: (Somnolent and not oriented. Difficult to understand what he is saying. Refusing to answer questions but does say his name  ).    Skin:  Warm and dry.          Results Review:    Reviewed medications and new clinical results    ampicillin 500 mg Intravenous Q6H   aspirin 81 mg Oral Daily   atorvastatin 80 mg Oral Nightly   digoxin 125 mcg Oral Daily   insulin aspart 0-9 Units Subcutaneous 4x Daily AC & at Bedtime   insulin detemir 10 Units Subcutaneous Nightly   metoprolol tartrate 50 mg Oral Q8H       dextrose 5% and sodium chloride 0.9% with KCl 40 mEq/L 75 mL/hr       Results from last 7 days  Lab Units 17  0559 17  0434   WBC 10*3/mm3 11.63* 9.90   HEMOGLOBIN g/dL 11.1* 11.1*   PLATELETS 10*3/mm3 223 278       Results from last 7 days  Lab Units 17  0508 17  1653 17  0559 17  2041 17  0808 " 04/02/17  1638 04/02/17  0548 04/01/17  0434   SODIUM mmol/L  --   --  148*  --   --   --  144 148*   POTASSIUM mmol/L 2.9* 2.9* 3.2* 3.3* 3.0* 3.3* 3.0* 3.5   CHLORIDE mmol/L  --   --  109*  --   --   --  106 108*   TOTAL CO2 mmol/L  --   --  21.2*  --   --   --  27.6 23.5   BUN mg/dL  --   --  6*  --   --   --  8 6*   CREATININE mg/dL  --   --  0.50*  --   --   --  0.64* 0.64*   CALCIUM mg/dL  --   --  8.1*  --   --   --  8.4* 8.3*   GLUCOSE mg/dL  --   --  156*  --   --   --  201* 169*     Glucose   Date/Time Value Ref Range Status   04/05/2017 1227 148 (H) 70 - 130 mg/dL Final   04/05/2017 0803 135 (H) 70 - 130 mg/dL Final   04/04/2017 2236 202 (H) 70 - 130 mg/dL Final   04/04/2017 1609 179 (H) 70 - 130 mg/dL Final   04/04/2017 1123 146 (H) 70 - 130 mg/dL Final   04/04/2017 0732 138 (H) 70 - 130 mg/dL Final   04/04/2017 0603 125 70 - 130 mg/dL Final   04/03/2017 2036 113 70 - 130 mg/dL Final     Estimated Creatinine Clearance: 88.9 mL/min (by C-G formula based on Cr of 0.5).    Lab Results   Component Value Date    URINECX >100,000 CFU/mL Enterococcus faecalis (A) 03/28/2017       MRI Head    IMPRESSION:  1. Multifocal subacute subdural hematomas. Largest components of  hematomas are present lateral to the anterior left frontal lobe  measuring 8 mm in thickness and adjacent to the left occipital lobe  measuring 12 mm in thickness. Localized mass effect without shift of the  midline structures.  2. Acute 10 x 4 mm right frontal lobe cortical infarction. Acute 5 mm  right parietal lobe lacunar infarction. Suspect subacute right posterior  superior frontal lobe lacunar infarction.  3. Chronic areas of encephalomalacia as described are consistent with  old infarctions. Chronic small vessel ischemic white matter disease.  Assessment/Plan   Active Hospital Problems (** Indicates Principal Problem)    Diagnosis Date Noted   • Diarrhea [R19.7] 03/29/2017   • Acute cystitis with hematuria [N30.01] 03/28/2017   • Urinary  retention [R33.9] 03/28/2017   • Chronic indwelling Menjivar catheter [Z92.89] 03/28/2017   • Metabolic encephalopathy [G93.41] 03/28/2017   • DM type 2 (diabetes mellitus, type 2) [E11.9] 02/13/2017   • Chronic a-fib [I48.2] 02/13/2017   • CAD (coronary artery disease) [I25.10] 02/13/2017   • Carotid artery stenosis [I65.29] 12/15/2016   • Thrombocytopenia [D69.6] 04/25/2016   • B12 deficiency [E53.8] 02/08/2016      Resolved Hospital Problems    Diagnosis Date Noted Date Resolved   No resolved problems to display.     · More somnolent today, had valium for MRI and reported twitching last night and given IV ativan and fosphenytoin  · Continuous EEG, unfortunately picking at EEG so have to place restraints for 24h  · Add K with IVF  · Check c. Diff for diarrhea  · D/w son regarding nutrition, he's going to talk to sister.  will have trouble keeping Cortrak due to AMS  · Acute right frontal lobe and right parietal lobe infarct, subacute right posterior superior frontal lobe lacunar infarct  · H/o afib not on AC due to previous SDH earlier in the month.  Now with strokes off AC.    · Enterococcus UTI on ampicillin  · Replace K  ·  BS fine, on ssi and levemir 10 units, likely needs higher dose but wait until Mental status improves  · Keep NPO    D/W RN, and son    Franco Conway MD [place]   04/05/17  3:30 PM

## 2017-04-05 NOTE — SIGNIFICANT NOTE
04/05/17 1417   Rehab Treatment   Discipline speech language pathologist   Rehab Evaluation   Evaluation Not Performed other (see comments)  (SLP attempted swallow evaluation. Pt unable to follow commands and unable to accept trials from spoon this date. SLP to f/u for eval as pt able to participate. )

## 2017-04-05 NOTE — PLAN OF CARE
Problem: Patient Care Overview (Adult)  Goal: Plan of Care Review  Outcome: Ongoing (interventions implemented as appropriate)    04/05/17 0306   Outcome Evaluation   Outcome Summary/Follow up Plan Patient resting in bed. Hard to complete NIH stroke scale at beginning of shift. Still not wanting to follow commands. Potassium protocol followed for K+ of 2.9.   Coping/Psychosocial Response Interventions   Plan Of Care Reviewed With patient       Goal: Discharge Needs Assessment  Outcome: Ongoing (interventions implemented as appropriate)    Problem: Fall Risk (Adult)  Goal: Absence of Falls  Outcome: Ongoing (interventions implemented as appropriate)    Problem: Confusion, Acute (Adult)  Goal: Safety  Outcome: Ongoing (interventions implemented as appropriate)

## 2017-04-05 NOTE — PROGRESS NOTES
Neuro  He had some jaw jerking and some forced closure of left eye.  No limb jerking    Still enc   The patient voices no complaints.     CHIEF COMPLAINT:  Stupor.      DIAGNOSIS:  Encephalopathy.      The patient has not changed significantly.  The nursing staff say if anything, he seems a little more interactive today.  No jerking of the face or arm had been seen overnight.      PHYSICAL EXAMINATION:  Reveals a male lying in bed.  He continues to turn to his right if allowed to do so.  When I first come in the room, his eyes had some twitching to the left but that only lasted for about a minute.  There was no forced eye deviation; however, there was some transient jerking of the jaw.   I could not say one side or the other.  I also saw some of the same twitching around the right eye as I saw yesterday.  I saw nothing in the limbs.      DIAGNOSTIC DATA:  His CTA was done yesterday. The subdural hematomas look the same as with the previous CT and in particular there is no mass effect.  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  Left internal carotid where there is a stent looks fine.  However, there is atherosclerotic disease in both posterior cerebrals and M3 branch of the left middle cerebral.      IMPRESSION:  The MRI has shown scattered infarcts in the right MCA distribution.  In addition, he has subdurals involving the left hemisphere.  The infarcts are likely embolic since there is no significant atherosclerotic disease affecting the right anterior circulation.  As discussed, I think she we should treat him with aspirin only given the subdurals.      The cause of this stupor is not clear, perhaps it is a combination of the original systemic infection (which appears to have cleared) along with subdurals and the small infarcts in the right MCA, right parietal infarcts can cause delirium but these are reasonably small and I am not entirely  convinced this is the explanation.  We also have this twitching as described above and I think we will put him on a long term EEG monitor and make sure he is not having subclinical seizures.             phalopathic that could be combo of SDH, new right hem infarcts and sepsis but need to r/o Sz.    Will set up EEG montor.    LISHA Almeida MD

## 2017-04-06 NOTE — PLAN OF CARE
Problem: Patient Care Overview (Adult)  Goal: Plan of Care Review  Outcome: Ongoing (interventions implemented as appropriate)    04/05/17 2030   Patient Care Overview   Progress unable to show any progress toward functional goals   Outcome Evaluation   Outcome Summary/Follow up Plan Pt very difficult to assess. NIH 17, although pt does not follow commands. continous EEG for seizure. R face twitching noted dr shubham maldonado protocol. soft restraints to help avoid pulling on eeg. family notified. failed swallow again. IVF. VSS   Coping/Psychosocial Response Interventions   Plan Of Care Reviewed With patient         Problem: Fall Risk (Adult)  Goal: Identify Related Risk Factors and Signs and Symptoms  Outcome: Ongoing (interventions implemented as appropriate)    Problem: SAFETY - NON-VIOLENT RESTRAINT  Goal: Remains free of injury from restraints (Non-Violent Restraint)  Outcome: Ongoing (interventions implemented as appropriate)

## 2017-04-06 NOTE — SIGNIFICANT NOTE
04/06/17 1546   Rehab Treatment   Discipline physical therapy assistant   Treatment Not Performed patient unavailable for treatment  (Pt is sedated and unable to be aroused.  PT to check back tomorrow)   Recommendation   PT - Next Appointment 04/07/17

## 2017-04-06 NOTE — PROGRESS NOTES
"                    [facility]               ASSOCIATES     LOS: 9 days     Name: Aime Hernandez  Age: 72 y.o.  Sex: male  :  1945  MRN: [id number]         Primary Care Physician: JANIE Urena    Subjective   Pulling at EEG so in restraints  No acute events  Still very somnolent and confused  Doesn't answer any questions       Objective   Body mass index is 25.78 kg/(m^2).  NPO Diet    Objective:  General Appearance:  In no acute distress, not in pain, ill-appearing and uncomfortable (somnolent).    Vital signs: (most recent): Blood pressure (!) 153/102, pulse 98, temperature 97.7 °F (36.5 °C), temperature source Oral, resp. rate 21, height 72\" (182.9 cm), weight 190 lb 1.6 oz (86.2 kg), SpO2 97 %.    Lungs:  Normal respiratory rate and normal effort.  He is not in respiratory distress.  Breath sounds clear to auscultation.    Heart: Normal rate.  Regular rhythm.    Abdomen: Abdomen is soft.  There is no abdominal tenderness.  There is no rebound tenderness.  There is no guarding.     Extremities: There is no dependent edema.    Neurological: (Somnolent and not oriented. Difficult to understand what he is saying. Refusing to answer questions but does say his name  ).    Skin:  Warm and dry.          Results Review:    Reviewed medications and new clinical results    ampicillin 500 mg Intravenous Q6H   aspirin 81 mg Oral Daily   atorvastatin 80 mg Oral Nightly   digoxin 125 mcg Oral Daily   insulin aspart 0-9 Units Subcutaneous 4x Daily AC & at Bedtime   insulin detemir 10 Units Subcutaneous Nightly   levETIRAcetam 1,000 mg Intravenous Q12H   metoprolol tartrate 50 mg Oral Q8H       dextrose 5% and sodium chloride 0.9% with KCl 40 mEq/L 75 mL/hr Last Rate: 75 mL/hr (17 0622)       Results from last 7 days  Lab Units 17  0559 17  0434   WBC 10*3/mm3 11.63* 9.90   HEMOGLOBIN g/dL 11.1* 11.1*   PLATELETS 10*3/mm3 223 278       Results from last 7 days  Lab Units " 04/06/17  0643 04/05/17  2031 04/05/17  0508 04/04/17  1653 04/04/17  0559 04/03/17  2041 04/03/17  0808  04/02/17  0548 04/01/17  0434   SODIUM mmol/L  --   --   --   --  148*  --   --   --  144 148*   POTASSIUM mmol/L 4.0 3.6 2.9* 2.9* 3.2* 3.3* 3.0*  < > 3.0* 3.5   CHLORIDE mmol/L  --   --   --   --  109*  --   --   --  106 108*   TOTAL CO2 mmol/L  --   --   --   --  21.2*  --   --   --  27.6 23.5   BUN mg/dL  --   --   --   --  6*  --   --   --  8 6*   CREATININE mg/dL  --   --   --   --  0.50*  --   --   --  0.64* 0.64*   CALCIUM mg/dL  --   --   --   --  8.1*  --   --   --  8.4* 8.3*   GLUCOSE mg/dL  --   --   --   --  156*  --   --   --  201* 169*   < > = values in this interval not displayed.  Glucose   Date/Time Value Ref Range Status   04/06/2017 0732 150 (H) 70 - 130 mg/dL Final   04/05/2017 2038 167 (H) 70 - 130 mg/dL Final   04/05/2017 1807 164 (H) 70 - 130 mg/dL Final   04/05/2017 1227 148 (H) 70 - 130 mg/dL Final   04/05/2017 0803 135 (H) 70 - 130 mg/dL Final   04/04/2017 2236 202 (H) 70 - 130 mg/dL Final   04/04/2017 1609 179 (H) 70 - 130 mg/dL Final   04/04/2017 1123 146 (H) 70 - 130 mg/dL Final     Estimated Creatinine Clearance: 88.9 mL/min (by C-G formula based on Cr of 0.5).    Lab Results   Component Value Date    URINECX >100,000 CFU/mL Enterococcus faecalis (A) 03/28/2017       MRI Head    IMPRESSION:  1. Multifocal subacute subdural hematomas. Largest components of  hematomas are present lateral to the anterior left frontal lobe  measuring 8 mm in thickness and adjacent to the left occipital lobe  measuring 12 mm in thickness. Localized mass effect without shift of the  midline structures.  2. Acute 10 x 4 mm right frontal lobe cortical infarction. Acute 5 mm  right parietal lobe lacunar infarction. Suspect subacute right posterior  superior frontal lobe lacunar infarction.  3. Chronic areas of encephalomalacia as described are consistent with  old infarctions. Chronic small vessel ischemic  white matter disease.  Assessment/Plan   Active Hospital Problems (** Indicates Principal Problem)    Diagnosis Date Noted   • Diarrhea [R19.7] 03/29/2017   • Acute cystitis with hematuria [N30.01] 03/28/2017   • Urinary retention [R33.9] 03/28/2017   • Chronic indwelling Menjivar catheter [Z92.89] 03/28/2017   • Metabolic encephalopathy [G93.41] 03/28/2017   • DM type 2 (diabetes mellitus, type 2) [E11.9] 02/13/2017   • Chronic a-fib [I48.2] 02/13/2017   • CAD (coronary artery disease) [I25.10] 02/13/2017   • Carotid artery stenosis [I65.29] 12/15/2016   • Thrombocytopenia [D69.6] 04/25/2016   • B12 deficiency [E53.8] 02/08/2016      Resolved Hospital Problems    Diagnosis Date Noted Date Resolved   No resolved problems to display.     · Even somnolent today  · Continuous EEG showing seizures, d/w Dr. Owens, unfortunately picking at EEG so have to place restraints for 24h, on Keppra  · Added K with IVF  · D/w son regarding nutrition, he's going to talk to sister.  will have trouble keeping Cortrak due to AMS but worth a try. Need answer from them today since he hasn't eaten in 4 days  · Acute right frontal lobe and right parietal lobe infarct, subacute right posterior superior frontal lobe lacunar infarct  · H/o afib not on AC due to previous SDH earlier in the month.  Now with strokes off AC.    · Enterococcus UTI on ampicillin D7, encephalopathy not from UTI  ·  BS fine, on ssi and levemir 10 units, likely needs higher dose but wait until Mental status improves  · Keep NPO    D/W RN     Franco Tamayo MD   04/06/17  9:51 AM

## 2017-04-06 NOTE — SIGNIFICANT NOTE
04/06/17 1139   Rehab Treatment   Discipline physical therapy assistant   Treatment Not Performed other (see comments)  (Pt currently having EEG at bedside. RN asked for PT to check back later.)   Recommendation   PT - Next Appointment 04/06/17

## 2017-04-06 NOTE — SIGNIFICANT NOTE
04/06/17 1112   Rehab Treatment   Discipline speech language pathologist   Rehab Evaluation   Evaluation Not Performed unable to evaluate, medical status change;other (see comments)  (spoke w/RN. pt not appropriate for swallow eval, to remain NPO at this time.)   Recommendations   SLP - Next Appointment 04/07/17  (ST to f/u 4/7 )

## 2017-04-06 NOTE — PLAN OF CARE
Problem: Patient Care Overview (Adult)  Goal: Plan of Care Review  Outcome: Ongoing (interventions implemented as appropriate)    04/06/17 2028   Patient Care Overview   Progress no change   Outcome Evaluation   Outcome Summary/Follow up Plan Patient confused. Family at the bedside this evening. Decided they wanted a cortrak. Spoke with MD and got an order for that. Also decided they want him to be a DNR. MD stated he would change that in the computer. Continues with eeg throughout the night. Condom cath ordered. and in place now.    Coping/Psychosocial Response Interventions   Plan Of Care Reviewed With family         Problem: Fall Risk (Adult)  Goal: Identify Related Risk Factors and Signs and Symptoms  Outcome: Ongoing (interventions implemented as appropriate)  Goal: Absence of Falls  Outcome: Ongoing (interventions implemented as appropriate)    Problem: SAFETY - NON-VIOLENT RESTRAINT  Goal: Remains free of injury from restraints (Non-Violent Restraint)  Outcome: Ongoing (interventions implemented as appropriate)  No injuries noted at this time.  Goal: Free from restraint(s) (Non-Violent Restraint)  Outcome: Ongoing (interventions implemented as appropriate)  Patient continues to pull at eeg cords.     Problem: Infection, Risk/Actual (Adult)  Goal: Identify Related Risk Factors and Signs and Symptoms  Outcome: Ongoing (interventions implemented as appropriate)  Goal: Infection Prevention/Resolution  Outcome: Ongoing (interventions implemented as appropriate)    Problem: Confusion, Acute (Adult)  Goal: Identify Related Risk Factors and Signs and Symptoms  Outcome: Ongoing (interventions implemented as appropriate)  Goal: Cognitive/Functional Impairments Minimized  Outcome: Ongoing (interventions implemented as appropriate)  Goal: Safety  Outcome: Ongoing (interventions implemented as appropriate)

## 2017-04-06 NOTE — PROGRESS NOTES
Neuro  Dictated    EEG suggestive of intermittent Sz    Plan  Keppra  Load with VPA  Geodon to see if helps with agitation then ronnell Almeida MD       CHIEF COMPLAINT: Stupor.    DIAGNOSIS: Cerebral infarct.    HISTORY: This patient has not changed. He is still restless and riving around in bed. No clinical seizures have been seen. The patient, of course, voices no complaints. Nursing staff does not feel he has changed clinically.    PHYSICAL EXAMINATION:  VITAL SIGNS: On exam, temperature is 97.3, pulse 106, respirations 24, blood pressure 157/109.  NECK: Carotids are equal without bruits.  CARDIAC: No murmur or gallop. There is no evidence of peripheral embolization.  SKIN: There is no rash.  NEUROLOGIC: He is restless. He is agitated. He will open his eyes. He will speak an occasional word, but he will not follow commands. He will not repeat sentences. His visual fields are full. Cranial nerves II-XII are intact. He moves all four extremities to pain. Toes are downgoing.    The EEG is difficult to interpret because it was quite degraded by movement artifact and then significant lead artifact. However, there is PLE D-type activity in the left hemisphere and I feel we saw at least 2 seizures before the EEG became degraded. They were brief, but I think they were real and emanating from the left hemisphere.    The patient surely has a delirium. This may be because of the subdurals along with the previous scattered infarcts in the left hemisphere and this admission with scattered infarcts in the right hemisphere. The left thalamus is involved which is a critical cognitive area and this may just be a vascular dementia with agitation; however, I think he is having some seizures. I have loaded him with Keppra last night. I am going to increase the Keppra to 1500 b.i.d., but I am also going to load him with 3 grams of Depakote and put that at 750 q.8 h. We are going to give him some of the Geodon and see if  he calms down and then hook him again to the long-term EEG monitor to see if we can get a better recording and make sure he is not going to seize.    I think it is unlikely he is going to return entirely normal cognitive function, but I could not totally exclude that.

## 2017-04-06 NOTE — SIGNIFICANT NOTE
04/06/17 0948   Rehab Treatment   Discipline occupational therapist   Rehab Evaluation   Evaluation Not Performed other (see comments)  (Pt. having test in the ROOM)   Recommendation   OT - Next Appointment 04/07/17

## 2017-04-07 PROBLEM — I63.9 ACUTE CVA (CEREBROVASCULAR ACCIDENT) (HCC): Status: ACTIVE | Noted: 2017-01-01

## 2017-04-07 NOTE — PROGRESS NOTES
Neuro  Dictated    Imp    Probable vascular dementia (SDH and bilateral infarcts).  Agitated but less so if not stimulated.  No Sz on EEG and will stop it. Continue 2 AEDs.  Will check VPA level and start Seroquel and reduce prn Geodin.  I dont anticiapte that he will become functional or a near normal cognitive funciton and is  DNR.    Olegario Almeida MD       The patient has had no witnessed seizures over the last 24 hours.  He calmed down quite a bit with the Geodon and slept most of the day yesterday.  This morning he is still somewhat sedated and less restless.  If stimulated, he becomes somewhat agitated.  No clinical seizures have been seen.      PHYSICAL EXAMINATION:    GENERAL:  Exam reveals a male lying in bed in no acute distress, though he does moan if stimulated.      VITAL SIGNS:  Temperature 97.4, pulse 112, respirations 17, blood pressure 137/85.    NECK:  Carotids are equal without bruits.  CARDIAC:  Reveals regular rate and rhythm with no significant murmur or gallop.  EXTREMITIES:  Nonedematous. Pulses are intact.   NEUROLOGIC:  His eyes are open.  He does not follow commands.  He does not speak.  Pupils are equal and reactive.  His eye movements are full.  He grimaces to supraorbital pain bilaterally.  He moves the right arm and right leg less than the left (these are old findings that are not commented on in my last note).  His toes are downgoing.      I reviewed the EEG from over the night and no seizure activity was seen.  The interictal activity which could be described as PLED earlier is now much less frequent sharp waves.       The patient has had no seizures over the last 24 hours, which is encouraging.  He is now on Keppra and Depakote.  We are going to check a Depakote level, but keep these dosages the same.  In total, I suspect he should be concerned of vascular dementia with bilateral multiple infarcts and left hemisphere subdural hematomas.  He was demented before admission and in the  nursing home and I only anticipate a very good recovery of function here.      We will continue the aspirin and statin. I dont see him as a candidate for anticoagulation despite his atrial fibrillation.      I am going to reduce the p.r.n. Geodon to 10 mg IM.  Hopefully, we will not need it at all.  I am going to put him on Seroquel 12.5 at bedtime to help control his behavior.  I am going to disconnect the EEG monitor.  Hopefully, we will see some improvement if any of this residual dysfunction is postictal or medication effect.  However, I fear it is mainly his multiple infarcts and subdural hematomas.

## 2017-04-07 NOTE — PROGRESS NOTES
Nutrition Services    Patient Name:  Aime Hernandez  YOB: 1945  MRN: [id number]  Admit Date:  3/28/2017      Cortrak placed at duodenum at 81 cm; RD to monitor and follow     Electronically signed by:  Gypsy Tom RD  04/07/17 12:58 PM

## 2017-04-07 NOTE — SIGNIFICANT NOTE
04/07/17 1022   Rehab Treatment   Discipline physical therapy assistant   Treatment Not Performed other (see comments)  (Per RN, pt very lethargic this am. Not appropriate for PT at this time. Will check back in pm. )   Recommendation   PT - Next Appointment 04/07/17

## 2017-04-07 NOTE — PROGRESS NOTES
Acute Care - Physical Therapy Treatment Note  [facility]     Patient Name: Aime Hernandez  : 1945  MRN: [id number]  Today's Date: 2017  Onset of Illness/Injury or Date of Surgery Date: 17  Date of Referral to PT: 17  Referring Physician: Lamar    Admit Date: 3/28/2017    Visit Dx:    ICD-10-CM ICD-9-CM   1. Acute cystitis with hematuria N30.01 595.0   2. Toxic metabolic encephalopathy G92 349.82     Patient Active Problem List   Diagnosis   • B12 deficiency   • Thrombocytopenia   • Carotid artery stenosis   • CAD (coronary artery disease)   • Chronic a-fib   • DM type 2 (diabetes mellitus, type 2)   • Acute posthemorrhagic anemia   • Acute cystitis with hematuria   • Urinary retention   • Chronic indwelling Menjivar catheter   • Metabolic encephalopathy   • Diarrhea               Adult Rehabilitation Note       17 1500          Rehab Assessment/Intervention    Discipline physical therapy assistant  -RW      Document Type therapy note (daily note)  -RW      Subjective Information decreased LOC   very little verbalization  -RW      Patient Effort, Rehab Treatment poor  -RW      Precautions/Limitations fall precautions;other (see comments)   Cortrak; restraint use  -RW      Recorded by [RW] Kirstie Cunningham PTA      Pain Assessment    Pain Assessment Unable to assess  -RW      Recorded by [RW] Kirstie Cunningham PTA      Cognitive Assessment/Intervention    Current Cognitive/Communication Assessment impaired  -RW      Orientation Status unable/difficult to assess  -RW      Follows Commands/Answers Questions 25% of the time;needs cueing;needs increased time;needs repetition  -RW      Personal Safety unable/difficult to assess  -RW      Recorded by [RW] Kirstie Cunningham PTA      Bed Mobility, Assessment/Treatment    Bed Mob, Supine to Sit, [place] not appropriate to assess  -RW      Bed Mobility, Comment Pt not appropirate for OOB activity at this time due to decreased LOC. Per RN, ok  for ther ex in bed.   -RW      Recorded by [RW] Kirstie Cunningham PTA      Therapy Exercises    Bilateral Lower Extremities PROM:;AAROM:;10 reps;supine;hip abduction/adduction;heel slides  -RW      Bilateral Upper Extremity PROM:;AAROM:;10 reps;supine;elbow flexion/extension;shoulder abduction/adduction;shoulder extension/flexion  -RW      Recorded by [RW] Kirstie Cunningham PTA      Positioning and Restraints    Pre-Treatment Position in bed  -RW      Post Treatment Position bed  -RW      In Bed supine;call light within reach;encouraged to call for assist;exit alarm on  -RW      Restraints soft limb;reapplied:  -RW      Recorded by [RW] Kirstie Cunningham PTA        User Key  (r) = Recorded By, (t) = Taken By, (c) = Cosigned By    Initials Name Effective Dates    RW Kirstie Cunningham PTA 04/06/16 -                 IP PT Goals       03/30/17 0929          Bed Mobility PT LTG    Bed Mobility PT LTG, Date Established 03/30/17  -MD      Bed Mobility PT LTG, Time to Achieve 1 wk  -MD      Bed Mobility PT LTG, Activity Type supine to sit/sit to supine  -MD      Bed Mobility PT LTG, [place] Level minimum assist (75% patient effort)  -MD      Transfer Training PT LTG    Transfer Training PT LTG, Date Established 03/30/17  -MD      Transfer Training PT LTG, Time to Achieve 1 wk  -MD      Transfer Training PT LTG, Activity Type sit to stand/stand to sit  -MD      Transfer Training PT LTG, [place] Level minimum assist (75% patient effort)  -MD      Transfer Training PT LTG, Assist Device walker, rolling  -MD      Gait Training PT LTG    Gait Training Goal PT LTG, Date Established 03/30/17  -MD      Gait Training Goal PT LTG, Time to Achieve 1 wk  -MD      Gait Training Goal PT LTG, [place] Level minimum assist (75% patient effort)  -MD      Gait Training Goal PT LTG, Assist Device walker, rolling  -MD      Gait Training Goal PT LTG, Distance to Achieve 100  -MD        User Key  (r) = Recorded By, (t) = Taken By, (c)  = Cosigned By    Initials Name Provider Type    MD Tamar Patel, PT Physical Therapist          Physical Therapy Education     Title: PT OT SLP Therapies (Active)     Topic: Physical Therapy (Active)     Point: Home exercise program (Active)    Learning Progress Summary    Learner Readiness Method Response Comment Documented by Status   Patient Acceptance E NR  RW 04/07/17 1550 Active               Point: Precautions (Active)    Learning Progress Summary    Learner Readiness Method Response Comment Documented by Status   Patient Acceptance E NR  RW 04/04/17 1126 Active    Acceptance E,D NR  MD 03/30/17 0922 Active                      User Key     Initials Effective Dates Name Provider Type Discipline    MD 12/01/15 -  Tamar Patel, PT Physical Therapist PT     04/06/16 -  Kirstie Cunningham, PTA Physical Therapy Assistant PT                    PT Recommendation and Plan  Anticipated Equipment Needs At Discharge: gait belt  Anticipated Discharge Disposition: extended care facility  Planned Therapy Interventions: bed mobility training, gait training, patient/family education, transfer training  PT Frequency: 2-3 times/wk  Plan of Care Review  Plan Of Care Reviewed With: patient  Outcome Summary/Follow up Plan: Pt w/ decreased LOC this pm still. Slightly more alert and was able to squeeze hand when asked, but not appopriate for OOB activity. Tolerated ROM well.           Outcome Measures       04/07/17 1500          How much help from another person do you currently need...    Turning from your back to your side while in flat bed without using bedrails? 1  -RW      Moving from lying on back to sitting on the side of a flat bed without bedrails? 1  -RW      Moving to and from a bed to a chair (including a wheelchair)? 1  -RW      Standing up from a chair using your arms (e.g., wheelchair, bedside chair)? 1  -RW      Climbing 3-5 steps with a railing? 1  -RW      To walk in hospital room? 1  -RW      AM-PAC 6 Clicks Score 6   -      Functional Assessment    Outcome Measure Options AM-PAC 6 Clicks Basic Mobility (PT)  -RW        User Key  (r) = Recorded By, (t) = Taken By, (c) = Cosigned By    Initials Name Provider Type     Kirstie Cunningham PTA Physical Therapy Assistant           Time Calculation:         PT Charges       04/07/17 1548 04/07/17 1022       Time Calculation    Start Time 1508  -RW      Stop Time 1520  -RW      Time Calculation (min) 12 min  -RW      PT Received On 04/07/17  -      PT - Next Appointment 04/10/17  -RW 04/07/17  -       User Key  (r) = Recorded By, (t) = Taken By, (c) = Cosigned By    Initials Name Provider Type     Kirstie Cunningham PTA Physical Therapy Assistant          Therapy Charges for Today     Code Description Service Date Service Provider Modifiers Qty    [id number] HC PT THER PROC EA 15 MIN 4/7/2017 Kirstie Cunningham PTA GP 1          PT G-Codes  Outcome Measure Options: AM-PAC 6 Clicks Basic Mobility (PT)    Kirstie uCnningham PTA  4/7/2017

## 2017-04-07 NOTE — CONSULTS
Nutrition Services    Patient Name:  Aime Hernandez  YOB: 1945  MRN: [id number]  Admit Date:  3/28/2017        Completed tube feeding assessment triggered by RN consult. RD associate to place cortrak.      Electronically signed by:  Cierra Hernandez RD  04/07/17 10:42 AM

## 2017-04-07 NOTE — SIGNIFICANT NOTE
04/07/17 0956   Rehab Treatment   Discipline occupational therapist   Rehab Evaluation   Evaluation Not Performed other (see comments)  (Unable to arouse, nsg also attemped. Will check back tomorrow.)   Recommendation   OT - Next Appointment 04/08/17

## 2017-04-07 NOTE — SIGNIFICANT NOTE
04/07/17 1141   Rehab Treatment   Discipline speech language pathologist   Rehab Evaluation   Evaluation Not Performed other (see comments)  (RN states pt not appropriate for swallow today.  Cortrak being placed today.  Requests f/u early next week as pt able.)

## 2017-04-07 NOTE — PLAN OF CARE
Problem: Patient Care Overview (Adult)  Goal: Plan of Care Review  Outcome: Ongoing (interventions implemented as appropriate)  Goal: Adult Individualization and Mutuality  Outcome: Ongoing (interventions implemented as appropriate)  Goal: Discharge Needs Assessment  Outcome: Ongoing (interventions implemented as appropriate)    Problem: Fall Risk (Adult)  Goal: Identify Related Risk Factors and Signs and Symptoms  Outcome: Ongoing (interventions implemented as appropriate)  Goal: Absence of Falls  Outcome: Ongoing (interventions implemented as appropriate)    Problem: SAFETY - NON-VIOLENT RESTRAINT  Goal: Remains free of injury from restraints (Non-Violent Restraint)  Outcome: Ongoing (interventions implemented as appropriate)  No s/s of injury noted  Goal: Free from restraint(s) (Non-Violent Restraint)  Outcome: Ongoing (interventions implemented as appropriate)  Patient continues to pull at tubes and lines whenever his restraints are loosened    Problem: Infection, Risk/Actual (Adult)  Goal: Identify Related Risk Factors and Signs and Symptoms  Outcome: Ongoing (interventions implemented as appropriate)  Goal: Infection Prevention/Resolution  Outcome: Ongoing (interventions implemented as appropriate)    Problem: Confusion, Acute (Adult)  Goal: Identify Related Risk Factors and Signs and Symptoms  Outcome: Ongoing (interventions implemented as appropriate)  Goal: Cognitive/Functional Impairments Minimized  Outcome: Ongoing (interventions implemented as appropriate)  Goal: Safety  Outcome: Ongoing (interventions implemented as appropriate)

## 2017-04-07 NOTE — PROGRESS NOTES
Continued Stay Note  [facility]     Patient Name: Aime Hernandez  MRN: [id number]  Today's Date: 4/7/2017    Admit Date: 3/28/2017          Discharge Plan       04/07/17 1648    Case Management/Social Work Plan    Additional Comments Corinne Pine Top nikolai to follow.  Will need to check with facility for bed availability if discharged over weekend.                Discharge Codes     None        Expected Discharge Date and Time     Expected Discharge Date Expected Discharge Time    Apr 4, 2017             Aleja Williamson RN

## 2017-04-07 NOTE — PROGRESS NOTES
"                    [facility]               ASSOCIATES     LOS: 10 days     Name: Aime Hernandez  Age: 72 y.o.  Sex: male  :  1945  MRN: [id number]         Primary Care Physician: JANIE Urena    Subjective   Confused. Seemed to answer some questions (denies pain) but not sure how reliable. Restrained. Discussed with Flor. Time: 35 minutes, greater than 50% spent in counseling and coordination of care.    Objective   Body mass index is 25.78 kg/(m^2).  Jevity 1.2 (standard with fiber); Tube Feeding type: Continuous; Continuous Tube Feeding Start Rate (mL/hr): 25; Then advance rate by (mL/hr): 20; Every __ hrs: 8; To goal rate of (mL/hr): 70; Patient is NPO (no tray)    Objective:  General Appearance:  Comfortable and in no acute distress (restrained).    Vital signs: (most recent): Blood pressure 124/80, pulse 104, temperature 99.1 °F (37.3 °C), temperature source Oral, resp. rate 17, height 72\" (182.9 cm), weight 190 lb 1.6 oz (86.2 kg), SpO2 94 %.    Lungs:  Normal respiratory rate and normal effort.  He is not in respiratory distress.  Breath sounds clear to auscultation.    Heart: Normal rate.  Regular rhythm.    Abdomen: Abdomen is soft.  There is no abdominal tenderness.  There is not left upper quadrant tenderness.  There is no rebound tenderness.  There is no guarding.     Extremities: There is no dependent edema.    Neurological: (Confused).    Skin:  Warm and dry.          Results Review:    Reviewed medications and new clinical results    ampicillin 500 mg Intravenous Q6H   aspirin 81 mg Oral Daily   atorvastatin 80 mg Oral Nightly   digoxin 125 mcg Oral Daily   insulin aspart 0-9 Units Subcutaneous 4x Daily AC & at Bedtime   insulin detemir 10 Units Subcutaneous Nightly   levETIRAcetam 1,500 mg Intravenous Q12H   metoprolol tartrate 50 mg Oral Q8H   QUEtiapine 12.5 mg Oral Nightly   valproate sodium 750 mg Intravenous Q8H       dextrose 5% and sodium chloride 0.9% " with KCl 40 mEq/L 75 mL/hr Last Rate: 75 mL/hr (04/07/17 0013)       Results from last 7 days  Lab Units 04/07/17  0737 04/04/17  0559 04/01/17  0434   WBC 10*3/mm3 7.02 11.63* 9.90   HEMOGLOBIN g/dL 11.7* 11.1* 11.1*   PLATELETS 10*3/mm3 247 223 278     Results from last 7 days  Lab Units 04/07/17  0737 04/06/17  0643 04/05/17 2031 04/05/17  0508 04/04/17  1653 04/04/17  0559 04/03/17  2041 04/02/17  0548 04/01/17  0434   SODIUM mmol/L 148*  --   --   --   --  148*  --   --  144 148*   POTASSIUM mmol/L 3.6 4.0 3.6 2.9* 2.9* 3.2* 3.3*  < > 3.0* 3.5   CHLORIDE mmol/L 111*  --   --   --   --  109*  --   --  106 108*   TOTAL CO2 mmol/L 22.6  --   --   --   --  21.2*  --   --  27.6 23.5   BUN mg/dL 5*  --   --   --   --  6*  --   --  8 6*   CREATININE mg/dL 0.66*  --   --   --   --  0.50*  --   --  0.64* 0.64*   CALCIUM mg/dL 7.8*  --   --   --   --  8.1*  --   --  8.4* 8.3*   GLUCOSE mg/dL 221*  --   --   --   --  156*  --   --  201* 169*   < > = values in this interval not displayed.  Glucose   Date/Time Value Ref Range Status   04/07/2017 1551 126 70 - 130 mg/dL Final   04/07/2017 1059 200 (H) 70 - 130 mg/dL Final   04/07/2017 0705 183 (H) 70 - 130 mg/dL Final   04/06/2017 2031 172 (H) 70 - 130 mg/dL Final   04/06/2017 1627 143 (H) 70 - 130 mg/dL Final   04/06/2017 1115 136 (H) 70 - 130 mg/dL Final   04/06/2017 0732 150 (H) 70 - 130 mg/dL Final   04/05/2017 2038 167 (H) 70 - 130 mg/dL Final     Estimated Creatinine Clearance: 88.9 mL/min (by C-G formula based on Cr of 0.66).    Assessment/Plan   Active Hospital Problems (** Indicates Principal Problem)    Diagnosis Date Noted   • **Metabolic encephalopathy [G93.41] 03/28/2017   • Acute CVA (cerebrovascular accident) [I63.9] 04/07/2017   • Diarrhea [R19.7] 03/29/2017   • Acute cystitis with hematuria [N30.01] 03/28/2017   • Urinary retention [R33.9] 03/28/2017   • Chronic indwelling Menjivar catheter [Z92.89] 03/28/2017   • DM type 2 (diabetes mellitus, type 2) [E11.9]  02/13/2017   • Chronic a-fib [I48.2] 02/13/2017   • CAD (coronary artery disease) [I25.10] 02/13/2017   • Carotid artery stenosis [I65.29] 12/15/2016   • Thrombocytopenia [D69.6] 04/25/2016   • B12 deficiency [E53.8] 02/08/2016      Resolved Hospital Problems    Diagnosis Date Noted Date Resolved   No resolved problems to display.     · Appreciate neuro help  · Stop ampicillin (3/31/17, UTI)  · Currently on TF  · Discussed with daughter Flor: recent SDH and now strokes off AC, poor prognosis for functional/cognitive recovery, dysphagia, feeding tubes, goals of care (she feels he would not want to live like this), and discussed option of comfort care.    Dakota Newton MD   04/07/17  6:52 PM

## 2017-04-07 NOTE — PLAN OF CARE
Problem: Patient Care Overview (Adult)  Goal: Plan of Care Review  Outcome: Ongoing (interventions implemented as appropriate)    04/07/17 1731   Patient Care Overview   Progress declining   Outcome Evaluation   Outcome Summary/Follow up Plan Pt lethagric. Slowly become more aware. Does not answer questions, and is follow very simple commands. incontinent . soft restraints. cotrack was placed. jevity 1.2 running. q2turn.    Coping/Psychosocial Response Interventions   Plan Of Care Reviewed With patient         Problem: Fall Risk (Adult)  Goal: Identify Related Risk Factors and Signs and Symptoms  Outcome: Ongoing (interventions implemented as appropriate)    Problem: SAFETY - NON-VIOLENT RESTRAINT  Goal: Remains free of injury from restraints (Non-Violent Restraint)  Outcome: Ongoing (interventions implemented as appropriate)    Problem: Confusion, Acute (Adult)  Goal: Identify Related Risk Factors and Signs and Symptoms  Outcome: Ongoing (interventions implemented as appropriate)

## 2017-04-08 PROBLEM — R19.7 DIARRHEA: Status: RESOLVED | Noted: 2017-01-01 | Resolved: 2017-01-01

## 2017-04-08 PROBLEM — N30.01 ACUTE CYSTITIS WITH HEMATURIA: Status: RESOLVED | Noted: 2017-01-01 | Resolved: 2017-01-01

## 2017-04-08 NOTE — PLAN OF CARE
Problem: SAFETY - NON-VIOLENT RESTRAINT  Goal: Remains free of injury from restraints (Non-Violent Restraint)  Outcome: Ongoing (interventions implemented as appropriate)  Goal: Free from restraint(s) (Non-Violent Restraint)  Outcome: Ongoing (interventions implemented as appropriate)

## 2017-04-08 NOTE — PROGRESS NOTES
"                    [facility]               ASSOCIATES     LOS: 11 days     Name: Aime Hernandez  Age: 72 y.o.  Sex: male  :  1945  MRN: [id number]         Primary Care Physician: JANIE Urena    Subjective   Confused. Mental status unchanged.    Objective   Body mass index is 25.78 kg/(m^2).  Jevity 1.2 (standard with fiber); Tube Feeding type: Continuous; Continuous Tube Feeding Start Rate (mL/hr): 25; Then advance rate by (mL/hr): 20; Every __ hrs: 8; To goal rate of (mL/hr): 70; Patient is NPO (no tray)    Objective:  General Appearance:  Comfortable and in no acute distress (restrained).    Vital signs: (most recent): Blood pressure 143/91, pulse 105, temperature 98.1 °F (36.7 °C), temperature source Oral, resp. rate 24, height 72\" (182.9 cm), weight 190 lb 1.6 oz (86.2 kg), SpO2 98 %.    Lungs:  Normal respiratory rate and normal effort.  He is not in respiratory distress.  Breath sounds clear to auscultation.    Heart: Normal rate.  Regular rhythm.    Abdomen: Abdomen is soft.  There is no abdominal tenderness.  There is no rebound tenderness.  There is no guarding.     Extremities: There is no dependent edema.    Neurological: (Confused. Answers simple questions (are you in pain- \"no\", and says \"yeah\" to others) but nothing beyond that.).    Skin:  Warm and dry.          Results Review:    Reviewed medications and new clinical results    aspirin 81 mg Oral Daily   atorvastatin 80 mg Oral Nightly   digoxin 125 mcg Oral Daily   insulin aspart 0-9 Units Subcutaneous 4x Daily AC & at Bedtime   insulin detemir 14 Units Subcutaneous Nightly   levETIRAcetam 1,500 mg Intravenous Q12H   metoprolol tartrate 50 mg Oral Q8H   QUEtiapine 12.5 mg Oral Nightly   valproate sodium 750 mg Intravenous Q8H          Results from last 7 days  Lab Units 17  0447 17  0737 17  0559   WBC 10*3/mm3 6.49 7.02 11.63*   HEMOGLOBIN g/dL 11.0* 11.7* 11.1*   PLATELETS 10*3/mm3 203 247 223 "     Results from last 7 days  Lab Units 04/08/17  0447 04/07/17  0737 04/06/17  0643 04/05/17  2031 04/05/17  0508 04/04/17  1653 04/04/17  0559  04/02/17  0548   SODIUM mmol/L 150* 148*  --   --   --   --  148*  --  144   POTASSIUM mmol/L 3.7 3.6 4.0 3.6 2.9* 2.9* 3.2*  < > 3.0*   CHLORIDE mmol/L 115* 111*  --   --   --   --  109*  --  106   TOTAL CO2 mmol/L 21.3* 22.6  --   --   --   --  21.2*  --  27.6   BUN mg/dL 7* 5*  --   --   --   --  6*  --  8   CREATININE mg/dL 0.70* 0.66*  --   --   --   --  0.50*  --  0.64*   CALCIUM mg/dL 7.5* 7.8*  --   --   --   --  8.1*  --  8.4*   GLUCOSE mg/dL 298* 221*  --   --   --   --  156*  --  201*   < > = values in this interval not displayed.  Glucose   Date/Time Value Ref Range Status   04/08/2017 0609 264 (H) 70 - 130 mg/dL Final   04/07/2017 2143 173 (H) 70 - 130 mg/dL Final   04/07/2017 1551 126 70 - 130 mg/dL Final   04/07/2017 1059 200 (H) 70 - 130 mg/dL Final   04/07/2017 0705 183 (H) 70 - 130 mg/dL Final   04/06/2017 2031 172 (H) 70 - 130 mg/dL Final   04/06/2017 1627 143 (H) 70 - 130 mg/dL Final   04/06/2017 1115 136 (H) 70 - 130 mg/dL Final     Estimated Creatinine Clearance: 88.9 mL/min (by C-G formula based on Cr of 0.7).    Assessment/Plan   Active Hospital Problems (** Indicates Principal Problem)    Diagnosis Date Noted   • **Metabolic encephalopathy [G93.41] 03/28/2017   • Acute CVA (cerebrovascular accident) [I63.9] 04/07/2017   • Urinary retention [R33.9] 03/28/2017   • Chronic indwelling Menjivar catheter [Z92.89] 03/28/2017   • DM type 2 (diabetes mellitus, type 2) [E11.9] 02/13/2017   • Chronic a-fib [I48.2] 02/13/2017   • CAD (coronary artery disease) [I25.10] 02/13/2017   • Carotid artery stenosis [I65.29] 12/15/2016   • B12 deficiency [E53.8] 02/08/2016      Resolved Hospital Problems    Diagnosis Date Noted Date Resolved   • Diarrhea [R19.7] 03/29/2017 04/08/2017   • Acute cystitis with hematuria [N30.01] 03/28/2017 04/08/2017   • Thrombocytopenia  [D69.6] 04/25/2016 04/08/2017     · Finished antibiotic for UTI  · Continue TF  · Stop NS.   · Increase levemir  · Discussed with RN.  · Will discuss with daughter PEG, goals of care.    Dakota Newton MD   04/08/17  9:50 AM

## 2017-04-08 NOTE — PLAN OF CARE
Problem: Patient Care Overview (Adult)  Goal: Plan of Care Review  Outcome: Ongoing (interventions implemented as appropriate)    04/08/17 0702   Patient Care Overview   Progress progress toward functional goals as expected   Outcome Evaluation   Outcome Summary/Follow up Plan continues to be non responsive. Jevity now running at 65 cc/hr. VSS   Coping/Psychosocial Response Interventions   Plan Of Care Reviewed With patient       Goal: Discharge Needs Assessment  Outcome: Ongoing (interventions implemented as appropriate)    Problem: Fall Risk (Adult)  Goal: Identify Related Risk Factors and Signs and Symptoms  Outcome: Ongoing (interventions implemented as appropriate)  Goal: Absence of Falls  Outcome: Ongoing (interventions implemented as appropriate)    Problem: Infection, Risk/Actual (Adult)  Goal: Identify Related Risk Factors and Signs and Symptoms  Outcome: Ongoing (interventions implemented as appropriate)  Goal: Infection Prevention/Resolution  Outcome: Ongoing (interventions implemented as appropriate)    Problem: Confusion, Acute (Adult)  Goal: Identify Related Risk Factors and Signs and Symptoms  Outcome: Ongoing (interventions implemented as appropriate)  Goal: Cognitive/Functional Impairments Minimized  Outcome: Ongoing (interventions implemented as appropriate)  Goal: Safety  Outcome: Ongoing (interventions implemented as appropriate)

## 2017-04-08 NOTE — PROGRESS NOTES
Neuro (dictated)  Slightly calmer but still restless  No focality    Imp- Vascular dementia and irritable/restless.    Plan  No further w/u needed.  Continue Keppra  Prognosis gaurmarcie.    LISHA Almeida MD     The patient has been a bit calmer over the last 24 hours as per the nursing staff. He was sedated excessively with 40 mg of Geodon 2 days ago and he has not received any of that since though he does have a 20 mg Geodon IM q.6 h. p.r.n. ordered. He got 12.5 of Seroquel last night but has been quite restless throughout the day so far.     CHIEF COMPLAINT:  Stupor.     DIAGNOSIS: Vascular dementia.     PHYSICAL EXAMINATION:    GENERAL:  Reveals a man writhing around in bed with a great tendency to turn towards his right.   VITAL SIGNS:  His blood pressure is 143/91, pulse 105, respirations 24, temperature 98.1.   NECK: Carotids are equal without bruits.   CARDIAC: Reveals regular rate and rhythm with no significant murmur or gallop.   EXTREMITIES:  Nonedematous. Pulses are intact.   NEUROLOGICAL:  He is lying in bed rather restless with tendency to turn towards the right. I see no seizures and none have been witnessed by the nursing staff. His eye movements are full. He will follow simple commands today. I got him to stick out his tongue and I got him to  his left arm to command. His face moves symmetrically. Eye movements are full. Pupils are equal and reactive. He moves all 4 extremities against gravity. Toes are downgoing.     IMPRESSION: The patient is still rather agitated. Once again I think this is a vascular dementia. I do not think there is any active neurological disease at this point. He does have atrial fib but with the subdurals I do not feel we can anticoagulate him. He is on aspirin 81 mg tablet which is reasonable but is also at some risk given the subdurals but I would agree with using it given the fact that he surely has embolized from this atrial fibrillation. I think we have established that  he is not seizing in the last few days but will continue the Keppra 1500 b.i.d.     As far as his outcome, I think we are going at best be left with a mild-to-moderately demented man who probably will not reach a point of self care. He has been made a DO NOT RESUSCITATE by the family. One could withdrawal support more completely but I will leave that up to the family and the hospitalist.     At this point I do not think any further neurologic workup is required. If after his subdurals are resolved in a couple of months one might revisit the question of anticoagulation but not now. When he is taking p.o. I would switch the Keppra to 1500 p.o. b.i.d.     From a station point of view, I asked the nurses to give him a 20 mg IM dose of Geodon and to change his Seroquel to 12.5 mg p.o. b.i.d. I will see him intermittently.     Thank you for allowing me to see the patient.

## 2017-04-08 NOTE — CONSULTS
Nutrition Services    Patient Name:  Aime Hernandez  YOB: 1945  MRN: [id number]  Admit Date:  3/28/2017      Increased fluid flushes to 35 cc q hour due to hypernatremia to better meet patient's needs    Electronically signed by:  Gypsy Tom RD  04/08/17 9:55 AM

## 2017-04-08 NOTE — PLAN OF CARE
Problem: Patient Care Overview (Adult)  Goal: Plan of Care Review  Outcome: Ongoing (interventions implemented as appropriate)    04/08/17 1812   Patient Care Overview   Progress no change   Outcome Evaluation   Outcome Summary/Follow up Plan Patient confused, restraints present to bilateral wrist, Continues with cortrak and tube feeding with water flushes, restless and was given IM geodon with milld relief of symptoms. Family at the bedside.    Coping/Psychosocial Response Interventions   Plan Of Care Reviewed With patient;family         Problem: Fall Risk (Adult)  Goal: Identify Related Risk Factors and Signs and Symptoms  Outcome: Ongoing (interventions implemented as appropriate)  Goal: Absence of Falls  Outcome: Ongoing (interventions implemented as appropriate)    Problem: SAFETY - NON-VIOLENT RESTRAINT  Goal: Remains free of injury from restraints (Non-Violent Restraint)  Outcome: Ongoing (interventions implemented as appropriate)  No injuries noted, monitored q2 hours, positions changed, skin checked  Goal: Free from restraint(s) (Non-Violent Restraint)  Outcome: Ongoing (interventions implemented as appropriate)  Patient continues to pull at tubes and lines when not in restraints    Problem: Infection, Risk/Actual (Adult)  Goal: Identify Related Risk Factors and Signs and Symptoms  Outcome: Ongoing (interventions implemented as appropriate)  Goal: Infection Prevention/Resolution  Outcome: Ongoing (interventions implemented as appropriate)    Problem: Confusion, Acute (Adult)  Goal: Identify Related Risk Factors and Signs and Symptoms  Outcome: Ongoing (interventions implemented as appropriate)  Goal: Cognitive/Functional Impairments Minimized  Outcome: Ongoing (interventions implemented as appropriate)  Goal: Safety  Outcome: Ongoing (interventions implemented as appropriate)

## 2017-04-09 NOTE — CONSULTS
Tomás Segovia MD       CHIEF COMPLAINT: Confusion.    DIAGNOSIS: Encephalopathy.    HISTORY OF PRESENT ILLNESS: This patient continues to be restless and confused. His Geodon dose was decreased to 10 mg, which still makes him sleep and calms him down, but it lasts a shorter period of time, perhaps 4 hours. He is on Seroquel 12.5 b.i.d., but that does not seem to be having much of an effect yet.    PHYSICAL EXAMINATION:  GENERAL: He is lying in bed, somnolent, in no acute distress.  VITAL SIGNS: Blood pressure 155/91, heart rate 97, respirations 18, temperature 97.4.  NECK: Carotids are equal without bruits.  HEART: Regular rate and rhythm with no significant murmur or gallop.  LUNGS: No labored respirations.   ABDOMEN: No hepatosplenomegaly.  EXTREMITIES: Nonedematous. Pulses are intact. There is no rash.  NEUROLOGIC: He is somnolent. If stimulated, he will moan, opens his eyes briefly. He will not follow commands. His pupils are equal and reactive. His cranial nerves are intact. He moves all 4 extremities equally. Toes are downgoing.    DIAGNOSTIC DATA: I reviewed his CT again. He does have extensive vascular insult, in particular, he has multiple left-sided subdural hematomas. He has infarcts in the left frontal lobe. The MCA/PCA _____ areas bilaterally in the left greater than right frontal lobe and small scattered infarcts in the right MCA distribution. Lab work is reviewed and shows no significant abnormalities.    IMPRESSION AND PLAN: As discussed previously, I think he has a vascular dementia and because of this he is quite confused and agitated. I do not think we are going to see a great degree of improvement. I surely do not think we are going to see a return to normal cognitive function. At this point, I think early realistic goal is to get him to calm down and at least appear more comfortable. In that regard, I am going to push the Seroquel up to 25 t.i.d. We will continue to use the Geodon  10 mg IM q.6 h. p.r.n. I will see him intermittently. I do not think any further testing is required.

## 2017-04-09 NOTE — PLAN OF CARE
Problem: Patient Care Overview (Adult)  Goal: Plan of Care Review  Outcome: Ongoing (interventions implemented as appropriate)    04/09/17 1900   Patient Care Overview   Progress no change   Outcome Evaluation   Outcome Summary/Follow up Plan Family at the bedside and made aware of med changes. Continues with retraints.    Coping/Psychosocial Response Interventions   Plan Of Care Reviewed With family         Problem: SAFETY - NON-VIOLENT RESTRAINT  Goal: Remains free of injury from restraints (Non-Violent Restraint)  Outcome: Ongoing (interventions implemented as appropriate)  No injuries noted  Goal: Free from restraint(s) (Non-Violent Restraint)  Outcome: Ongoing (interventions implemented as appropriate)  Patient unable to be removed from restraints at this time. Pulling at tubes.    Problem: Infection, Risk/Actual (Adult)  Goal: Identify Related Risk Factors and Signs and Symptoms  Outcome: Ongoing (interventions implemented as appropriate)  Goal: Infection Prevention/Resolution  Outcome: Ongoing (interventions implemented as appropriate)    Problem: Confusion, Acute (Adult)  Goal: Identify Related Risk Factors and Signs and Symptoms  Outcome: Ongoing (interventions implemented as appropriate)  Goal: Cognitive/Functional Impairments Minimized  Outcome: Ongoing (interventions implemented as appropriate)  Goal: Safety  Outcome: Ongoing (interventions implemented as appropriate)

## 2017-04-09 NOTE — PROGRESS NOTES
"                    [facility]               ASSOCIATES     LOS: 12 days     Name: Aime Hernandez  Age: 72 y.o.  Sex: male  :  1945  MRN: [id number]         Primary Care Physician: JANIE Urena    Subjective   Confused. Mental status unchanged. Time: 35 minutes, greater than 50% spent in counseling and coordination of care.    Objective   Body mass index is 25.78 kg/(m^2).  Jevity 1.2 (standard with fiber); Tube Feeding type: Continuous; Continuous Tube Feeding Start Rate (mL/hr): 25; Then advance rate by (mL/hr): 20; Every __ hrs: 8; To goal rate of (mL/hr): 70; Patient is NPO (no tray)    Objective:  General Appearance:  Comfortable and in no acute distress (restrained).    Vital signs: (most recent): Blood pressure 145/92, pulse 105, temperature 98.2 °F (36.8 °C), temperature source Oral, resp. rate 18, height 72\" (182.9 cm), weight 190 lb 1.6 oz (86.2 kg), SpO2 95 %.    Lungs:  Normal respiratory rate and normal effort.  He is not in respiratory distress.  There are rhonchi (with couth).    Heart: Normal rate.  Regular rhythm.    Abdomen: Abdomen is soft.  There is no abdominal tenderness.  There is no rebound tenderness.  There is no guarding.     Extremities: There is no dependent edema.    Neurological: (Confused. Answers simple questions and following some simple commands (wiggle toes)).    Skin:  Warm and dry.          Results Review:    Reviewed medications and new clinical results    aspirin 81 mg Oral Daily   atorvastatin 80 mg Oral Nightly   digoxin 125 mcg Oral Daily   insulin aspart 0-9 Units Subcutaneous 4x Daily AC & at Bedtime   insulin detemir 14 Units Subcutaneous Nightly   levETIRAcetam 1,500 mg Intravenous Q12H   metoprolol tartrate 50 mg Oral Q8H   QUEtiapine 12.5 mg Oral BID   valproate sodium 750 mg Intravenous Q8H          Results from last 7 days  Lab Units 17  0447 17  0737 17  0559   WBC 10*3/mm3 6.49 7.02 11.63*   HEMOGLOBIN g/dL 11.0* " 11.7* 11.1*   PLATELETS 10*3/mm3 203 247 223       Results from last 7 days  Lab Units 04/09/17  0433 04/08/17  0447 04/07/17  0737 04/06/17  0643 04/05/17  2031 04/05/17  0508 04/04/17  1653 04/04/17  0559   SODIUM mmol/L 149* 150* 148*  --   --   --   --  148*   POTASSIUM mmol/L 3.1* 3.7 3.6 4.0 3.6 2.9* 2.9* 3.2*   CHLORIDE mmol/L 112* 115* 111*  --   --   --   --  109*   TOTAL CO2 mmol/L 24.9 21.3* 22.6  --   --   --   --  21.2*   BUN mg/dL 8 7* 5*  --   --   --   --  6*   CREATININE mg/dL 0.59* 0.70* 0.66*  --   --   --   --  0.50*   CALCIUM mg/dL 8.0* 7.5* 7.8*  --   --   --   --  8.1*   GLUCOSE mg/dL 195* 298* 221*  --   --   --   --  156*     Glucose   Date/Time Value Ref Range Status   04/09/2017 0719 273 (H) 70 - 130 mg/dL Final   04/08/2017 2355 171 (H) 70 - 130 mg/dL Final   04/08/2017 1611 274 (H) 70 - 130 mg/dL Final   04/08/2017 1124 175 (H) 70 - 130 mg/dL Final   04/08/2017 0609 264 (H) 70 - 130 mg/dL Final   04/07/2017 2143 173 (H) 70 - 130 mg/dL Final   04/07/2017 1551 126 70 - 130 mg/dL Final   04/07/2017 1059 200 (H) 70 - 130 mg/dL Final     Estimated Creatinine Clearance: 88.9 mL/min (by C-G formula based on Cr of 0.59).    Assessment/Plan   Active Hospital Problems (** Indicates Principal Problem)    Diagnosis Date Noted   • **Metabolic encephalopathy [G93.41] 03/28/2017   • Acute CVA (cerebrovascular accident) [I63.9] 04/07/2017   • Urinary retention [R33.9] 03/28/2017   • Chronic indwelling Menjivar catheter [Z92.89] 03/28/2017   • DM type 2 (diabetes mellitus, type 2) [E11.9] 02/13/2017   • Chronic a-fib [I48.2] 02/13/2017   • CAD (coronary artery disease) [I25.10] 02/13/2017   • Carotid artery stenosis [I65.29] 12/15/2016   • B12 deficiency [E53.8] 02/08/2016      Resolved Hospital Problems    Diagnosis Date Noted Date Resolved   • Diarrhea [R19.7] 03/29/2017 04/08/2017   • Acute cystitis with hematuria [N30.01] 03/28/2017 04/08/2017   • Thrombocytopenia [D69.6] 04/25/2016 04/08/2017      · Finished antibiotic for UTI  · Continue TF  · Increase levemir a little more  · Free water increased, monitoring Na  · WOCN to see  · Check CXR some rhonchi today with cough  · Discussed with daughter PEG, risk of aspiration/PNA/death, goals of care, option for palliative care given prognosis and the fact that patient wouldn't want to live like this (she confirmed again). She is planning to discuss with other family members. Told her needs to make a decision soon as Cortrak isn't long term.  · Discussed with RN.    Dakota Newton MD   04/09/17  9:41 AM

## 2017-04-09 NOTE — PLAN OF CARE
Problem: Patient Care Overview (Adult)  Goal: Plan of Care Review  Outcome: Ongoing (interventions implemented as appropriate)  Goal: Adult Individualization and Mutuality  Outcome: Ongoing (interventions implemented as appropriate)  Goal: Discharge Needs Assessment  Outcome: Ongoing (interventions implemented as appropriate)    Problem: Fall Risk (Adult)  Goal: Identify Related Risk Factors and Signs and Symptoms  Outcome: Ongoing (interventions implemented as appropriate)  Goal: Absence of Falls  Outcome: Ongoing (interventions implemented as appropriate)    Problem: SAFETY - NON-VIOLENT RESTRAINT  Goal: Remains free of injury from restraints (Non-Violent Restraint)  Outcome: Ongoing (interventions implemented as appropriate)  No s/s of injury noted  Goal: Free from restraint(s) (Non-Violent Restraint)  Outcome: Ongoing (interventions implemented as appropriate)  Patient very confused and continues to pull at tubes and lines    Problem: Infection, Risk/Actual (Adult)  Goal: Identify Related Risk Factors and Signs and Symptoms  Outcome: Ongoing (interventions implemented as appropriate)  Goal: Infection Prevention/Resolution  Outcome: Ongoing (interventions implemented as appropriate)    Problem: Confusion, Acute (Adult)  Goal: Identify Related Risk Factors and Signs and Symptoms  Outcome: Ongoing (interventions implemented as appropriate)  Goal: Cognitive/Functional Impairments Minimized  Outcome: Ongoing (interventions implemented as appropriate)  Goal: Safety  Outcome: Ongoing (interventions implemented as appropriate)

## 2017-04-10 NOTE — PROGRESS NOTES
"                    [facility]               ASSOCIATES     LOS: 13 days     Name: Aime Hernandez  Age: 72 y.o.  Sex: male  :  1945  MRN: [id number]         Primary Care Physician: JANIE Urena    Subjective   Discussed with RN. Family wishes PEG. Mental status unchanged.    Objective   Body mass index is 25.78 kg/(m^2).  Jevity 1.2 (standard with fiber); Tube Feeding type: Continuous; Continuous Tube Feeding Start Rate (mL/hr): 25; Then advance rate by (mL/hr): 20; Every __ hrs: 8; To goal rate of (mL/hr): 70; Patient is NPO (no tray)    Objective:  General Appearance:  Comfortable and in no acute distress (restrained).    Vital signs: (most recent): Blood pressure 153/92, pulse 83, temperature 98.4 °F (36.9 °C), temperature source Oral, resp. rate 17, height 72\" (182.9 cm), weight 190 lb 1.6 oz (86.2 kg), SpO2 95 %.    Lungs:  Normal respiratory rate and normal effort.  He is not in respiratory distress.  Breath sounds clear to auscultation.  No rhonchi.    Heart: Normal rate.  Regular rhythm.    Abdomen: Abdomen is soft.  There is no abdominal tenderness.  There is no rebound tenderness.  There is no guarding.     Extremities: There is no dependent edema.    Neurological: (Confused.).    Skin:  Warm and dry.          Results Review:    Reviewed medications and new clinical results    aluminum sulfate-calcium acetate 1 packet Topical Q8H   aspirin 81 mg Oral Daily   atorvastatin 80 mg Oral Nightly   digoxin 125 mcg Oral Daily   insulin aspart 0-9 Units Subcutaneous 4x Daily AC & at Bedtime   insulin detemir 18 Units Subcutaneous Nightly   levETIRAcetam 1,500 mg Intravenous Q12H   metoprolol tartrate 50 mg Oral Q8H   nystatin  Topical Q12H   QUEtiapine 25 mg Oral TID   valproate sodium 750 mg Intravenous Q8H          Results from last 7 days  Lab Units 17  0447 17  0737 17  0559   WBC 10*3/mm3 6.49 7.02 11.63*   HEMOGLOBIN g/dL 11.0* 11.7* 11.1*   PLATELETS " 10*3/mm3 203 247 223       Results from last 7 days  Lab Units 04/10/17  0611 04/09/17  2234 04/09/17  0433 04/08/17  0447 04/07/17  0737 04/06/17  0643 04/05/17 2031 04/04/17  0559   SODIUM mmol/L 148*  --  149* 150* 148*  --   --   --  148*   POTASSIUM mmol/L 3.9 4.1 3.1* 3.7 3.6 4.0 3.6  < > 3.2*   CHLORIDE mmol/L 111*  --  112* 115* 111*  --   --   --  109*   TOTAL CO2 mmol/L 27.1  --  24.9 21.3* 22.6  --   --   --  21.2*   BUN mg/dL 12  --  8 7* 5*  --   --   --  6*   CREATININE mg/dL 0.61*  --  0.59* 0.70* 0.66*  --   --   --  0.50*   CALCIUM mg/dL 8.1*  --  8.0* 7.5* 7.8*  --   --   --  8.1*   GLUCOSE mg/dL 245*  --  195* 298* 221*  --   --   --  156*   < > = values in this interval not displayed.  Glucose   Date/Time Value Ref Range Status   04/10/2017 1113 204 (H) 70 - 130 mg/dL Final   04/10/2017 0745 262 (H) 70 - 130 mg/dL Final   04/09/2017 2042 274 (H) 70 - 130 mg/dL Final   04/09/2017 1620 224 (H) 70 - 130 mg/dL Final   04/09/2017 1132 242 (H) 70 - 130 mg/dL Final   04/09/2017 0719 273 (H) 70 - 130 mg/dL Final   04/08/2017 2355 171 (H) 70 - 130 mg/dL Final   04/08/2017 1611 274 (H) 70 - 130 mg/dL Final     Estimated Creatinine Clearance: 88.9 mL/min (by C-G formula based on Cr of 0.61).    Assessment/Plan   Active Hospital Problems (** Indicates Principal Problem)    Diagnosis Date Noted   • **Metabolic encephalopathy [G93.41] 03/28/2017   • Acute CVA (cerebrovascular accident) [I63.9] 04/07/2017   • Urinary retention [R33.9] 03/28/2017   • Chronic indwelling Menjivar catheter [Z92.89] 03/28/2017   • DM type 2 (diabetes mellitus, type 2) [E11.9] 02/13/2017   • Chronic a-fib [I48.2] 02/13/2017   • CAD (coronary artery disease) [I25.10] 02/13/2017   • Carotid artery stenosis [I65.29] 12/15/2016   • B12 deficiency [E53.8] 02/08/2016      Resolved Hospital Problems    Diagnosis Date Noted Date Resolved   • Diarrhea [R19.7] 03/29/2017 04/08/2017   • Acute cystitis with hematuria [N30.01] 03/28/2017  04/08/2017   • Thrombocytopenia [D69.6] 04/25/2016 04/08/2017     · Finished antibiotic for UTI  · Continue TF. Family wishes PEG. Ask GI to see  · Increase levemir a little more again  · ATX on CXR  · Discussed with RN.    Dakota Newton MD   04/10/17  4:05 PM

## 2017-04-10 NOTE — PLAN OF CARE
Problem: Patient Care Overview (Adult)  Goal: Plan of Care Review  Outcome: Ongoing (interventions implemented as appropriate)    04/10/17 0424   Patient Care Overview   Progress no change   Outcome Evaluation   Outcome Summary/Follow up Plan Patient continues with restraints. Cortrak wit tube feeding. Continues with urinary retention. New order to anchor a rodriguez. Rodriguez was placed with without difficulty small amount of blood noted from tip of penis. Patient tolerated well. Immediate return of 1175cc urine. New consult for GI for peg tube placement.    Coping/Psychosocial Response Interventions   Plan Of Care Reviewed With daughter         Problem: Fall Risk (Adult)  Goal: Identify Related Risk Factors and Signs and Symptoms  Outcome: Ongoing (interventions implemented as appropriate)  Goal: Absence of Falls  Outcome: Ongoing (interventions implemented as appropriate)    Problem: SAFETY - NON-VIOLENT RESTRAINT  Goal: Remains free of injury from restraints (Non-Violent Restraint)  Outcome: Ongoing (interventions implemented as appropriate)  Monitored Q 2 hours for safety.   No injuries noted.   Goal: Free from restraint(s) (Non-Violent Restraint)  Outcome: Ongoing (interventions implemented as appropriate)  Patient continues to be restless and pull at tubes and lines.   Order continued for calender day.     Problem: Infection, Risk/Actual (Adult)  Goal: Identify Related Risk Factors and Signs and Symptoms  Outcome: Ongoing (interventions implemented as appropriate)  Goal: Infection Prevention/Resolution  Outcome: Ongoing (interventions implemented as appropriate)    Problem: Confusion, Acute (Adult)  Goal: Identify Related Risk Factors and Signs and Symptoms  Outcome: Ongoing (interventions implemented as appropriate)  Goal: Cognitive/Functional Impairments Minimized  Outcome: Ongoing (interventions implemented as appropriate)  Goal: Safety  Outcome: Ongoing (interventions implemented as appropriate)

## 2017-04-10 NOTE — PROGRESS NOTES
Continued Stay Note  [facility]     Patient Name: Aime Hernandez  MRN: [id number]  Today's Date: 4/10/2017    Admit Date: 3/28/2017          Discharge Plan       04/10/17 1112    Case Management/Social Work Plan    Additional Comments Will nikolai to follow.  Noted family to discuss PEG placement.  [facility] following.              Discharge Codes     None        Expected Discharge Date and Time     Expected Discharge Date Expected Discharge Time    Apr 4, 2017             Aleja Williamson RN

## 2017-04-10 NOTE — PLAN OF CARE
Problem: Patient Care Overview (Adult)  Goal: Plan of Care Review  Outcome: Ongoing (interventions implemented as appropriate)    04/10/17 0329   Outcome Evaluation   Outcome Summary/Follow up Plan Seroquel was effective in helping pt to rest. VSS, still in restraints.          Problem: Fall Risk (Adult)  Goal: Identify Related Risk Factors and Signs and Symptoms  Outcome: Ongoing (interventions implemented as appropriate)    04/10/17 0329   Fall Risk   Fall Risk: Related Risk Factors age-related changes;confusion/agitation;gait/mobility problems;polypharmacy;environment unfamiliar;neuro disease/injury   Fall Risk: Signs and Symptoms presence of risk factors       Goal: Absence of Falls  Outcome: Ongoing (interventions implemented as appropriate)    04/10/17 0329   Fall Risk (Adult)   Absence of Falls making progress toward outcome         Problem: SAFETY - NON-VIOLENT RESTRAINT  Goal: Free from restraint(s) (Non-Violent Restraint)  Outcome: Ongoing (interventions implemented as appropriate)    Problem: Infection, Risk/Actual (Adult)  Goal: Identify Related Risk Factors and Signs and Symptoms  Outcome: Ongoing (interventions implemented as appropriate)  Goal: Infection Prevention/Resolution  Outcome: Ongoing (interventions implemented as appropriate)    04/10/17 0329   Infection, Risk/Actual (Adult)   Infection Prevention/Resolution making progress toward outcome         Problem: Confusion, Acute (Adult)  Goal: Identify Related Risk Factors and Signs and Symptoms  Outcome: Ongoing (interventions implemented as appropriate)    04/10/17 0329   Confusion, Acute   Related Risk Factors (Acute Confusion) advanced age;neurological impairment;cognitive impairment   Signs and Symptoms (Acute Confusion) thought process diminished/disorganized;communication disturbed       Goal: Cognitive/Functional Impairments Minimized  Outcome: Ongoing (interventions implemented as appropriate)    04/10/17 0329   Confusion, Acute (Adult)    Cognitive/Functional Impairments Minimized making progress toward outcome       Goal: Safety  Outcome: Ongoing (interventions implemented as appropriate)    04/10/17 0329   Confusion, Acute (Adult)   Safety making progress toward outcome

## 2017-04-10 NOTE — SIGNIFICANT NOTE
04/10/17 1159   Rehab Treatment   Discipline occupational therapist   Treatment Not Performed other (see comments)  (pt not appropriate for OT today. not following any directions, making noises, in restraints but not particpating in therapy. nsg aware and nsg states pt may be going palliative. awaiting family decision. [phone])   Recommendation   OT - Next Appointment 04/11/17

## 2017-04-10 NOTE — SIGNIFICANT NOTE
04/10/17 0922   Rehab Treatment   Discipline speech language pathologist   Rehab Evaluation   Evaluation Not Performed other (see comments)  (Pt unarousable for swallow evaluation this date. Discussed pt status with RN. ST feels pt at risk for aspiration with all PO secondary to cognitive status. ST recommend PO for QOL versus alt. means of nutrition. ST to s/o at this time. Please re-consult if pt able to maintain alertness for swallow evaluation or as pt appropriate. RN aware and in agreement.)

## 2017-04-10 NOTE — PROCEDURES
DATE OF EVALUATION:  04/05/2017    DURATION OF THE STUDY:  40 hours 31 minutes.    INDICATIONS:  Altered mental status.     The patient has a history of subdural hematoma.     DESCRIPTION OF FINDINGS:  This is a continuous bedside EEG study. At the onset of the recording, the posterior dominant rhythm is semirhythmic medium amplitude 7 Hz theta. There is increased muscle artifact noted bitemporally. Within 1 hour of the onset of the recording, the F7 electrode became displaced and the channel was lost. Overnight recording: There is severe electrode artifact seen at T5 and T6. Over the central parasagittal head regions, low amplitude beta activity at 25-30 Hz predominates with some rhythmic 7 Hz theta over the bioccipital regions. Electrode location was corrected the following day, in the afternoon. Beginning at about 2:30 p.m., the background was more easily discerned as artifact had mostly resolved. Rhythmic 6.5 to 7 Hz theta of medium amplitude appears symmetrically across the bitemporal and bioccipital regions. Frontally predominant generalized 3 Hz delta activity is also noted. Bifrontal activity is 5.5 Hz rhythmic theta of medium amplitude. There is intermittent focal slowing, taking the form of semirhythmic 2.5 Hz delta over the left frontopolar and left anterior-frontal head region. This activity is rhythmic and not epileptiform in nature. Spikes and sharp waves are not seen.     On the second night of recording, polymorphic delta at the left anterior quadrant was seen continually. There was an occasional tendency of the delta to show phase reversal at F7.     On the following morning, there was no alteration of the waking background.     INTERPRETATION:  Moderately abnormal study because of mild to moderate generalized slowing in the background. This finding is nonspecific and may be due to a generalized disorder or bilateral deficits. There is a more focal area of slowing indicating a region of focal  cerebral dysfunction involving the left anterior quadrant. This focal slowing may be related to a subdural hematoma as noted in the clinical history. Seizure activity, either clinically or interictally, was not noted on this recording.     EEG CODE:  31878-5 units.        Ti Isabel M.D.  C:ms  D:   04/10/2017 09:43:52  T:   04/10/2017 17:45:01  Job ID:   [id number]  Document ID:   [id number]  cc:

## 2017-04-10 NOTE — NURSING NOTE
WOCN consult: Excoriation right and left groin. Small satellite lesions/ yeast. Recommend [place] soaks and antifungal cream. Leave brief open

## 2017-04-11 NOTE — PROGRESS NOTES
Pharmacy Consult     Depacon  and Keppra IV to per Tube formulations   Both medications have a 1:1 IV to PO conversion    Valproate 750 mg IV Q8H last 0820 changed to valproic acid syrup 750 mg Q8H per tube next dose at 1600  Levetiracetam 1500 mg IV Q12H changed to levetiracetam solution 1500 mg Q12H per tube next dose 2100     Will sign off, please let us know if we can be of further assistance @ [phone]    Thanks,    Ai De Jesus, PharmD, BCPS  4/11/2017 10:35 AM

## 2017-04-11 NOTE — PROGRESS NOTES
Continued Stay Note  [facility]     Patient Name: Aime Hernandez  MRN: [id number]  Today's Date: 4/11/2017    Admit Date: 3/28/2017          Discharge Plan       04/11/17 1233    Case Management/Social Work Plan    Additional Comments Going for PEG placemnt tomorrow.  Spoke with Geovani with [facility] who is following and will update bed availability this week.              Discharge Codes     None        Expected Discharge Date and Time     Expected Discharge Date Expected Discharge Time    Apr 4, 2017             Aleja Williamson RN

## 2017-04-11 NOTE — PLAN OF CARE
Problem: Nutrition, Enteral (Adult)  Intervention: Monitor/Manage Nutrition Support  Plans for PEG placement on 4/12. Pt currently tolerating TF's with Jevity 1.2 @ 70ml/hr and water flushes of 35ml/hr.     Goal: Signs and Symptoms of Listed Potential Problems Will be Absent or Manageable (Nutrition, Enteral)  Outcome: Ongoing (interventions implemented as appropriate)

## 2017-04-11 NOTE — SIGNIFICANT NOTE
04/11/17 0956   PT Discharge Summary   Reason for Discharge Unable to participate   Outcomes Achieved Refer to plan of care for updates on goals achieved

## 2017-04-11 NOTE — CONSULTS
.  [facility]  Initial Inpatient Consult Note    Referring Provider: Dr. Newton    Reason for Consultation: Evaluate for PEG placement    Subjective     History of present illness:  72-year-old gentleman admitted on March 28 for altered mental status, neurology has evaluated him and diagnosed him with likely vascular dementia.  The patient is unable to respond to my questioning today.  There is no family at the bedside.  I have spoken to the nurse and she tells me that he cannot be evaluated for a bedside swallow study because of his confusion.  He has a Dobbhoff tube in place infusing tube feeds at goal.  There've been no issues with the tube feeds.  I have spoken to his wife today, she was aware that we were evaluating him for PEG placement and she has discussed all of Mr. Hernandez's options with her children and they would like to proceed.    Patient is  Not on anticoagulation, eliquis was stopped many days ago.  His last INR 10 days ago was 2.2.  He is not on antiplatelet therapy.  He has had no recent fevers.  I see no other contraindications for PEG placement.    Past Medical History:  Past Medical History:   Diagnosis Date   • Atrial fibrillation    • B12 deficiency    • Carotid artery disease    • Coronary artery disease    • Depression    • Diabetes mellitus    • History of thrombocytopenia    • Hypercholesteremia    • Hyperlipidemia    • Hypertension    • Old myocardial infarct 03/2015   • Stroke    • Subdural hematoma, acute 03/2017   • Vision loss, left eye        Past Surgical History:  Past Surgical History:   Procedure Laterality Date   • BACK SURGERY     • CAROTID STENT  2015   • CORONARY ARTERY BYPASS GRAFT       x 3   • INGUINAL HERNIA REPAIR     • TONSILLECTOMY AND ADENOIDECTOMY          Social History:   Social History   Substance Use Topics   • Smoking status: Former Smoker     Quit date: 1995   • Smokeless tobacco: Former User      Comment: Quit 25 yrs ago   • Alcohol  use No      Comment: quit in 1995        Family History:  Family History   Problem Relation Age of Onset   • Cancer Other    • Coronary artery disease Other    • Diabetes Other    • Hypertension Other    • Cancer Mother    • Diabetes Father    • Heart disease Father    • Stroke Father    • Diabetes Daughter    • Diabetes Son    • Heart disease Son        Home Meds:      Current Meds:     aluminum sulfate-calcium acetate 1 packet Topical Q8H   aspirin 81 mg Oral Daily   atorvastatin 80 mg Oral Nightly   digoxin 125 mcg Oral Daily   insulin aspart 0-9 Units Subcutaneous 4x Daily AC & at Bedtime   insulin detemir 20 Units Subcutaneous Nightly   levETIRAcetam 1,500 mg Intravenous Q12H   metoprolol tartrate 50 mg Oral Q8H   nystatin  Topical Q12H   QUEtiapine 25 mg Oral TID   valproate sodium 750 mg Intravenous Q8H       Allergies:  Allergies   Allergen Reactions   • No Known Drug Allergy        Review of Systems  Review of systems could not be obtained due to   patient confusion.     Objective     Vital Signs  Temp:  [97.6 °F (36.4 °C)-98.4 °F (36.9 °C)] 97.8 °F (36.6 °C)  Heart Rate:  [] 100  Resp:  [17-20] 20  BP: (138-156)/() 138/84    Physical Exam:  General Appearance:    Alert, cooperative, in no acute distress   Head:    Normocephalic, without obvious abnormality, atraumatic   Eyes:            Lids and lashes normal, conjunctivae and sclerae normal, no   icterus   Throat:   No oral lesions, no thrush, oral mucosa moist   Neck:   No adenopathy, supple, trachea midline, no thyromegaly, no   carotid bruit, no JVD   Lungs:     Clear to auscultation,respirations regular, even and                   unlabored    Heart:    Regular rhythm and normal rate, normal S1 and S2, no            murmur, no gallop, no rub, no click   Chest Wall:    No abnormalities observed   Abdomen:     Normal bowel sounds, no masses, no organomegaly, soft        non-tender, non-distended, no guarding, no rebound                  tenderness   Rectal:     Deferred   Extremities:   no edema, no cyanosis, no redness   Skin:   No bleeding, bruising or rash   Lymph nodes:   No palpable adenopathy   Psychiatric:  Judgement and insight: normal   Orientation to person place and time: normal   Mood and affect: normal     Results Review:   I reviewed the patient's new clinical results.      Results from last 7 days  Lab Units 04/08/17  0447 04/07/17  0737   WBC 10*3/mm3 6.49 7.02   HEMOGLOBIN g/dL 11.0* 11.7*   HEMATOCRIT % 35.2* 37.1*   PLATELETS 10*3/mm3 203 247         Results from last 7 days  Lab Units 04/11/17  0500 04/10/17  0611 04/09/17  2234 04/09/17  0433  04/07/17  0737   SODIUM mmol/L 142 148*  --  149*  < > 148*   POTASSIUM mmol/L 3.7 3.9 4.1 3.1*  < > 3.6   CHLORIDE mmol/L 104 111*  --  112*  < > 111*   TOTAL CO2 mmol/L 26.9 27.1  --  24.9  < > 22.6   BUN mg/dL 13 12  --  8  < > 5*   CREATININE mg/dL 0.55* 0.61*  --  0.59*  < > 0.66*   CALCIUM mg/dL 8.2* 8.1*  --  8.0*  < > 7.8*   BILIRUBIN mg/dL  --   --   --   --   --  0.4   ALK PHOS U/L  --   --   --   --   --  139*   ALT (SGPT) U/L  --   --   --   --   --  35   AST (SGOT) U/L  --   --   --   --   --  32   GLUCOSE mg/dL 296* 245*  --  195*  < > 221*   < > = values in this interval not displayed.          No results found for: LIPASE    Radiology:  Imaging Results (last 72 hours)     Procedure Component Value Units Date/Time    XR Chest 1 View [[id number]] Collected:  04/09/17 1451     Updated:  04/09/17 1516    Narrative:       EXAMINATION: SINGLE VIEW CHEST RADIOGRAPH     HISTORY: 72-year-old male with a history of shortness of air,  questionable aspiration.     FINDINGS: An upright AP portable chest radiograph was obtained.  Comparison is made to a prior examination dated 03/28/2017. The lungs  are slightly decreased in volume. Mild bibasilar atelectasis is noted.  There is a Dobbhoff tube positioned with the tip below the diaphragm and  outside the field-of-view. Visualized sternal  wires are noted.     This report was finalized on 4/9/2017 3:13 PM by Dr. Vick Chavez MD.             Assessment/Plan     Principal Problem:    Metabolic encephalopathy  Active Problems:    B12 deficiency    Carotid artery stenosis    CAD (coronary artery disease)    Chronic a-fib    DM type 2 (diabetes mellitus, type 2)    Urinary retention    Chronic indwelling Menjiavr catheter    Acute CVA (cerebrovascular accident)   evaluate for PEG placement    72-year-old gentleman that has been in the hospital 2 weeks, diagnosed with vascular dementia, cannot be evaluated for bedside swallow due to confusion, Dobbhoff tube in place with tube feeds at goal.  No contraindication to PEG placement.  He is not on antiplatelet or anticoagulant therapy.  He has not had an INR checked in 10 days.  Hemoglobin and platelet count adequate.    I had a long discussion with his wife,  in the presence of nursing staff, explaining the reasons why we were asked to evaluate him for PEG placement, the fact that it would be difficult for him to go to rehabilitation with a Dobbhoff tube in place, the fact that the PEG only needs to be in 3-4 weeks and then can be removed if needed.  The fact that there is a small surgery and all the risks of surgery were discussed with the wife including bleeding, infection, arrhythmia, need for mechanical ventilation, myocardial infarction, peritonitis and death.    She is agreeable to PEG placement and she has told me that she's discussed these issues with her family and they would all like the feeding tube placed.  I'm going to check routine labs today and again tomorrow morning.  As long as the INR is less than 1.8 we can proceed.  I will have 1 g of Ancef on call to endoscopy for PEG placement tomorrow.  We will stop tube feeds at midnight    I discussed the patients findings and my recommendations with patient, family and nursing staff

## 2017-04-11 NOTE — CONSULTS
Adult Nutrition  Assessment/PES    Patient Name:  Aime Hernandez  YOB: 1945  MRN: [id number]  Admit Date:  3/28/2017    Assessment Date:  4/11/2017     Nutrition F/U: Noted plans for PEG placement tomorrow. When medical status allows and TF's can be resumed suggest:    Change TF formula to Glucerna 1.2 given current BG levels. Goal rate would remain 70ml/hr and water flushes would remain 35ml q 1 hour.     RD to follow/monitor. Thanks.         Reason for Assessment       04/11/17 1550    Reason for Assessment    Reason For Assessment/Visit TF/PN;follow up protocol                Anthropometrics       04/11/17 1550    Anthropometrics    RD Documented Current Weight  86.2 kg (190 lb)            Labs/Tests/Procedures/Meds       04/11/17 1551    Labs/Tests/Procedures/Meds    Diagnostic Test/Procedure Review reviewed   Plans for PEG placement tomorrow.     Labs/Tests Review Reviewed;Glucose;Na+;Creat   Glu: 257, 297; Na+: 148    Medication Review Reviewed, pertinent;Anticonvulsant;Insulin   Keppra    Significant Vitals reviewed            Physical Findings       04/11/17 1554    Physical Findings/Assessment    Additional Documentation Physical Appearance (Group)    Physical Appearance    Overall Physical Appearance other (see comments)   Remains intermittently restless/confused. Won't follow commands.     Gastrointestinal feeding tube    Tubes nasoduoduenal tube   Cortrak remains in place.     Skin other (see comments)   B = 14: Scattered bruising/abrasions noted. Skin tears, excoriations. Skin care given per RN.               Nutrition Prescription Ordered       04/11/17 1556    Nutrition Prescription PO    Current PO Diet NPO    Nutrition Prescription EN    Enteral Route NG    Product Jevity 1.2 bethany    TF Delivery Method Continuous    Continuous TF Goal Rate (mL/hr) 70 mL/hr    Continuous TF Current Rate (mL/hr) 70 mL/hr    Water flush (mL)  35 mL    Water Flush Frequency Per hour             Evaluation of Received Nutrient/Fluid Intake       04/11/17 1557    EN Evaluation    TF Changes Held   To be held for PEG placement tomorrow.               Problem/Interventions:          Problem 2       04/11/17 1557    Nutrition Diagnoses Problem 2    Signs/Symptoms (evidenced by) EN Intake Delivery;Report/Observation    Percent (%) of EN goal 100 %    Reported/Observed By RN;MD    Other Comment PEG placement planned for 4/12/17.                   Intervention Goal       04/11/17 1558    Intervention Goal    General Maintain nutrition;Nutrition support treatment;Improved nutrition related lab(s)    TF/PN Maintain TF/PN;Tolerate TF at goal;Deliver (%) goal;Deliver estimated need (%)    Deliver % of Goal 100 %    Deliver % of Estimated Need 100 %    Transition --   PEG    Weight No significant weight loss            Nutrition Intervention       04/11/17 1558    Nutrition Intervention    RD/Tech Action Follow Tx progress;Care plan reviewd            Nutrition Prescription       04/11/17 1559    Nutrition Prescription EN    Enteral Prescription Enteral begin/change;Enteral to supply    Enteral Route PEG    Product Glucerna 1.2 bethany   given BG    TF Delivery Method Continuous    Continuous TF Goal Rate (mL/hr) 70 mL/hr    Continuous TF Starting Rate (mL/hr) 70 mL/hr    Water flush (mL)  35 mL    Water Flush Frequency Per hour    New EN Prescription Ordered? Yes    EN to Supply    Kcal/Day 2016 Kcal/Day    Protein (gm/day) 101 gm/day    Meet Estimated Kcal Need (%) 100 %    Meet Estimated Protein Need (%) 104 %    TF Free H2O (mL) 1352 mL    Total Free H2O (mL/day) 2192 mL/day    Other Orders    Obtain Weight Weekly    Obtain Weight Ordered? Yes            Education/Evaluation       04/11/17 1600    Education    Education Will Instruct as appropriate    Monitor/Evaluation    Monitor Per protocol;I&O;Pertinent labs;TF delivery/tolerance;Weight;Skin status;Symptoms    Education Follow-up Reinforce PRN        Comments:       Electronically signed by:  Veda Kendrick RD  04/11/17 4:07 PM

## 2017-04-11 NOTE — SIGNIFICANT NOTE
04/11/17 0959   Rehab Treatment   Discipline physical therapist   Treatment Not Performed other (see comments)  (RN, Kamari, states pt continues w/decreased LOC and inability to follow commands. Not appropriate for PT at this time. Will sign off, discussed w/RN to re-consult PT when pt more appropriate. )

## 2017-04-11 NOTE — PLAN OF CARE
Problem: Patient Care Overview (Adult)  Goal: Plan of Care Review  Outcome: Ongoing (interventions implemented as appropriate)    04/11/17 2595   Patient Care Overview   Progress no change   Outcome Evaluation   Outcome Summary/Follow up Plan pt arouses to voice. confused. cortrak remains in place. PEG placement tomorrow. pt more calm today but answered name. not following all commands.   Coping/Psychosocial Response Interventions   Plan Of Care Reviewed With patient;spouse         Problem: Fall Risk (Adult)  Goal: Absence of Falls  Outcome: Ongoing (interventions implemented as appropriate)    Problem: SAFETY - NON-VIOLENT RESTRAINT  Goal: Remains free of injury from restraints (Non-Violent Restraint)  Outcome: Ongoing (interventions implemented as appropriate)  Goal: Free from restraint(s) (Non-Violent Restraint)  Outcome: Ongoing (interventions implemented as appropriate)

## 2017-04-11 NOTE — PLAN OF CARE
Problem: Patient Care Overview (Adult)  Goal: Plan of Care Review  Outcome: Ongoing (interventions implemented as appropriate)    04/11/17 0612   Patient Care Overview   Progress no change   Outcome Evaluation   Outcome Summary/Follow up Plan Patient remains unchanged. Continues to be non responsive to staff. Responds only to pain. Remains in restraints. Continue to monitor.    Coping/Psychosocial Response Interventions   Plan Of Care Reviewed With patient       Goal: Discharge Needs Assessment  Outcome: Ongoing (interventions implemented as appropriate)    Problem: Fall Risk (Adult)  Goal: Identify Related Risk Factors and Signs and Symptoms  Outcome: Ongoing (interventions implemented as appropriate)  Goal: Absence of Falls  Outcome: Ongoing (interventions implemented as appropriate)    Problem: SAFETY - NON-VIOLENT RESTRAINT  Goal: Remains free of injury from restraints (Non-Violent Restraint)  Outcome: Ongoing (interventions implemented as appropriate)  Goal: Free from restraint(s) (Non-Violent Restraint)  Outcome: Ongoing (interventions implemented as appropriate)    Problem: Infection, Risk/Actual (Adult)  Goal: Identify Related Risk Factors and Signs and Symptoms  Outcome: Ongoing (interventions implemented as appropriate)  Goal: Infection Prevention/Resolution  Outcome: Ongoing (interventions implemented as appropriate)    Problem: Confusion, Acute (Adult)  Goal: Identify Related Risk Factors and Signs and Symptoms  Outcome: Ongoing (interventions implemented as appropriate)  Goal: Cognitive/Functional Impairments Minimized  Outcome: Ongoing (interventions implemented as appropriate)  Goal: Safety  Outcome: Ongoing (interventions implemented as appropriate)

## 2017-04-11 NOTE — PROGRESS NOTES
"                    [facility]               ASSOCIATES     LOS: 14 days     Name: Aime Hernandez  Age: 72 y.o.  Sex: male  :  1945  MRN: [id number]         Primary Care Physician: JANIE Urena    Subjective   Discussed with RN. Patient apparently said his name to RN today. A little somnolent today but awakens. Denies pain.    Objective   Body mass index is 25.78 kg/(m^2).  Jevity 1.2 (standard with fiber); Tube Feeding type: Continuous; Continuous Tube Feeding Start Rate (mL/hr): 25; Then advance rate by (mL/hr): 20; Every __ hrs: 8; To goal rate of (mL/hr): 70; Patient is NPO (no tray)  NPO Diet    Objective:  General Appearance:  Comfortable and in no acute distress (restrained).    Vital signs: (most recent): Blood pressure 138/84, pulse 100, temperature 97.8 °F (36.6 °C), temperature source Oral, resp. rate 20, height 72\" (182.9 cm), weight 190 lb 1.6 oz (86.2 kg), SpO2 95 %.    Lungs:  Normal respiratory rate and normal effort.  He is not in respiratory distress.  Breath sounds clear to auscultation.  No rhonchi.    Heart: Normal rate.  Regular rhythm.    Abdomen: Abdomen is soft.  There is no abdominal tenderness.  There is no rebound tenderness.  There is no guarding.     Extremities: There is no dependent edema.    Neurological: (Confused. Somnolent but awakens.).    Skin:  Warm and dry.          Results Review:    Reviewed medications and new clinical results    aluminum sulfate-calcium acetate 1 packet Topical Q8H   aspirin 81 mg Oral Daily   atorvastatin 80 mg Oral Nightly   [START ON 2017] ceFAZolin 1 g Intravenous Once   digoxin 125 mcg Oral Daily   insulin aspart 0-9 Units Subcutaneous 4x Daily AC & at Bedtime   insulin detemir 20 Units Subcutaneous Nightly   levETIRAcetam 1,500 mg Intravenous Q12H   metoprolol tartrate 50 mg Oral Q8H   nystatin  Topical Q12H   QUEtiapine 25 mg Oral TID   valproate sodium 750 mg Intravenous Q8H          Results from last 7 " days  Lab Units 04/08/17  0447 04/07/17  0737   WBC 10*3/mm3 6.49 7.02   HEMOGLOBIN g/dL 11.0* 11.7*   PLATELETS 10*3/mm3 203 247       Results from last 7 days  Lab Units 04/11/17  0500 04/10/17  0611 04/09/17  2234 04/09/17  0433 04/08/17  0447 04/07/17  0737 04/06/17  0643   SODIUM mmol/L 142 148*  --  149* 150* 148*  --    POTASSIUM mmol/L 3.7 3.9 4.1 3.1* 3.7 3.6 4.0   CHLORIDE mmol/L 104 111*  --  112* 115* 111*  --    TOTAL CO2 mmol/L 26.9 27.1  --  24.9 21.3* 22.6  --    BUN mg/dL 13 12  --  8 7* 5*  --    CREATININE mg/dL 0.55* 0.61*  --  0.59* 0.70* 0.66*  --    CALCIUM mg/dL 8.2* 8.1*  --  8.0* 7.5* 7.8*  --    GLUCOSE mg/dL 296* 245*  --  195* 298* 221*  --      Glucose   Date/Time Value Ref Range Status   04/11/2017 0739 257 (H) 70 - 130 mg/dL Final   04/10/2017 2140 248 (H) 70 - 130 mg/dL Final   04/10/2017 1704 186 (H) 70 - 130 mg/dL Final   04/10/2017 1113 204 (H) 70 - 130 mg/dL Final   04/10/2017 0745 262 (H) 70 - 130 mg/dL Final   04/09/2017 2042 274 (H) 70 - 130 mg/dL Final   04/09/2017 1620 224 (H) 70 - 130 mg/dL Final   04/09/2017 1132 242 (H) 70 - 130 mg/dL Final     Estimated Creatinine Clearance: 88.9 mL/min (by C-G formula based on Cr of 0.55).    Assessment/Plan   Active Hospital Problems (** Indicates Principal Problem)    Diagnosis Date Noted   • **Metabolic encephalopathy [G93.41] 03/28/2017   • Acute CVA (cerebrovascular accident) [I63.9] 04/07/2017   • Urinary retention [R33.9] 03/28/2017   • Chronic indwelling Menjivar catheter [Z92.89] 03/28/2017   • DM type 2 (diabetes mellitus, type 2) [E11.9] 02/13/2017   • Chronic a-fib [I48.2] 02/13/2017   • CAD (coronary artery disease) [I25.10] 02/13/2017   • Carotid artery stenosis [I65.29] 12/15/2016   • B12 deficiency [E53.8] 02/08/2016      Resolved Hospital Problems    Diagnosis Date Noted Date Resolved   • Diarrhea [R19.7] 03/29/2017 04/08/2017   • Acute cystitis with hematuria [N30.01] 03/28/2017 04/08/2017   • Thrombocytopenia [D69.6]  04/25/2016 04/08/2017     · Finished antibiotic for UTI  · Monitor if becomes too somnolent may need to back off on some meds. In the meantime switch Depacon and Keppra to per tube route instead of IV  · Cotrak TF. PEG tomorrow  · Hypernatremia improved with increased free water  · Adjust levemir again.  · Discussed with ADILENE.    Dakota Newton MD   04/11/17  10:17 AM

## 2017-04-11 NOTE — SIGNIFICANT NOTE
04/11/17 1406   Rehab Treatment   Discipline occupational therapist   Treatment Not Performed other (see comments)  (Discussion with PT and nsg, pt cont to not be appropriate for therapy and agitated, not following commands. Agree to re-order therapy if appropriate, OT will sign off at this time.)

## 2017-04-11 NOTE — PROGRESS NOTES
"  Patient Identification:  NAME:  Aime Hernandez  Age:  72 y.o.   Sex:  male   :  1945   MRN:  [id number]       Chief complaint: Metabolic encephalopathy    History of present illness:  Still intermittently restless and confused he is calm at this point.  He is able to say \"I'm okay\"      Past medical history:  Past Medical History:   Diagnosis Date   • Atrial fibrillation    • B12 deficiency    • Carotid artery disease    • Coronary artery disease    • Depression    • Diabetes mellitus    • History of thrombocytopenia    • Hypercholesteremia    • Hyperlipidemia    • Hypertension    • Old myocardial infarct 2015   • Stroke    • Subdural hematoma, acute 2017   • Vision loss, left eye        Allergies:  No known drug allergy    Home medications:  Prescriptions Prior to Admission   Medication Sig Dispense Refill Last Dose   • acetaminophen (TYLENOL) 325 MG tablet Take 650 mg by mouth Every 6 (Six) Hours As Needed for mild pain (1-3).   Unknown at Unknown time   • apixaban (ELIQUIS) 5 MG tablet tablet Take 1 tablet by mouth 2 (Two) Times a Day. 60 tablet     • atorvastatin (LIPITOR) 20 MG tablet Take 20 mg by mouth daily.   Unknown at Unknown time   • B-D ULTRAFINE III SHORT PEN 31G X 8 MM misc    Unknown at Unknown time   • Bioflavonoid Products (VITAMIN C PLUS PO) Take  by mouth Daily.   Taking   • digoxin (LANOXIN) 125 MCG tablet Take 1 tablet by mouth Daily.      • insulin glargine (LANTUS) 100 UNIT/ML injection Inject 20 Units under the skin daily.   Unknown at Unknown time   • metFORMIN (GLUCOPHAGE) 1000 MG tablet 1,000 mg Daily With Breakfast.   Unknown at Unknown time   • metoprolol tartrate (LOPRESSOR) 50 MG tablet Take 1 tablet by mouth Every 8 (Eight) Hours.      • nitroglycerin (NITROSTAT) 0.4 MG SL tablet Place 1 tablet under the tongue every 5 (five) minutes as needed for chest pain. 25 tablet 3 Unknown at Unknown time   • QUEtiapine (SEROquel) 25 MG tablet Take 1 tablet by mouth Every 12 " "(Twelve) Hours.      • VITAMIN E PO Take  by mouth Daily.   Unknown at Unknown time        Hospital medications:    aluminum sulfate-calcium acetate 1 packet Topical Q8H   aspirin 81 mg Oral Daily   atorvastatin 80 mg Oral Nightly   [START ON 4/12/2017] ceFAZolin 1 g Intravenous Once   digoxin 125 mcg Oral Daily   insulin aspart 0-9 Units Subcutaneous 4x Daily AC & at Bedtime   insulin detemir 24 Units Subcutaneous Nightly   levETIRAcetam 1,500 mg Per G Tube Q12H   metoprolol tartrate 50 mg Oral Q8H   nystatin  Topical Q12H   QUEtiapine 25 mg Oral TID   Valproic Acid 750 mg Per G Tube Q8H        •  acetaminophen  •  dextrose  •  dextrose  •  glucagon (human recombinant)  •  metoprolol  •  nitroglycerin  •  ondansetron **OR** ondansetron ODT **OR** ondansetron  •  potassium chloride **OR** potassium chloride **OR** potassium chloride  •  sodium chloride  •  sodium chloride  •  Insert peripheral IV **AND** sodium chloride  •  ziprasidone      Objective:  Vitals Ranges:   Temp:  [97.6 °F (36.4 °C)-98.4 °F (36.9 °C)] 98 °F (36.7 °C)  Heart Rate:  [] 85  Resp:  [17-20] 18  BP: (124-156)/(76-94) 124/94      Physical Exam:  Awake somewhat lethargic converses a little \"I'm okay\" is able to count fingers does squeeze bilaterally and wiggle his toes toes are downgoing bilaterally trace reflexes throughout    Results review:   I reviewed the patient's new clinical results.    Data review:  Lab Results (last 24 hours)     Procedure Component Value Units Date/Time    POC Glucose Fingerstick [[id number]]  (Abnormal) Collected:  04/10/17 1704    Specimen:  Blood Updated:  04/10/17 1707     Glucose 186 (H) mg/dL     Narrative:       Meter: [id number] : [id number] Juan M Montana    POC Glucose Fingerstick [[id number]]  (Abnormal) Collected:  04/10/17 2140    Specimen:  Blood Updated:  04/10/17 2152     Glucose 248 (H) mg/dL     Narrative:       Meter: [id number] : [id number] Amina NAVARRETE    Basic Metabolic Panel " [[id number]]  (Abnormal) Collected:  04/11/17 0500    Specimen:  Blood Updated:  04/11/17 0554     Glucose 296 (H) mg/dL      BUN 13 mg/dL      Creatinine 0.55 (L) mg/dL      Sodium 142 mmol/L      Potassium 3.7 mmol/L      Chloride 104 mmol/L      CO2 26.9 mmol/L      Calcium 8.2 (L) mg/dL      eGFR Non African Amer 146 mL/min/1.73      BUN/Creatinine Ratio 23.6     Anion Gap 11.1 mmol/L     Narrative:       The MDRD GFR formula is only valid for adults with stable renal function between ages 18 and 70.    POC Glucose Fingerstick [[id number]]  (Abnormal) Collected:  04/11/17 0739    Specimen:  Blood Updated:  04/11/17 0741     Glucose 257 (H) mg/dL     Narrative:       Meter: [id number] : [id number] Yaneradames Diaz    CBC & Differential [[id number]] Collected:  04/11/17 1051    Specimen:  Blood Updated:  04/11/17 1121    Narrative:       The following orders were created for panel order CBC & Differential.  Procedure                               Abnormality         Status                     ---------                               -----------         ------                     CBC Auto Differential[[id number]]         Abnormal            Final result                 Please view results for these tests on the individual orders.    CBC Auto Differential [[id number]]  (Abnormal) Collected:  04/11/17 1051    Specimen:  Blood Updated:  04/11/17 1121     WBC 7.82 10*3/mm3      RBC 3.44 (L) 10*6/mm3      Hemoglobin 10.4 (L) g/dL      Hematocrit 33.1 (L) %      MCV 96.2 fL      MCH 30.2 pg      MCHC 31.4 (L) g/dL      RDW 15.5 (H) %      RDW-SD 54.9 (H) fl      MPV 10.0 fL      Platelets 168 10*3/mm3      Neutrophil % 70.8 %      Lymphocyte % 15.1 (L) %      Monocyte % 6.5 %      Eosinophil % 6.8 (H) %      Basophil % 0.4 %      Immature Grans % 0.4 %      Neutrophils, Absolute 5.54 10*3/mm3      Lymphocytes, Absolute 1.18 10*3/mm3      Monocytes, Absolute 0.51 10*3/mm3      Eosinophils, Absolute 0.53 10*3/mm3      Basophils,  Absolute 0.03 10*3/mm3      Immature Grans, Absolute 0.03 10*3/mm3     Protime-INR [[id number]]  (Abnormal) Collected:  04/11/17 1051    Specimen:  Blood Updated:  04/11/17 1138     Protime 14.9 (H) Seconds      INR 1.22 (H)    POC Glucose Fingerstick [[id number]]  (Abnormal) Collected:  04/11/17 1148    Specimen:  Blood Updated:  04/11/17 1150     Glucose 297 (H) mg/dL     Narrative:       Meter: [id number] : [id number] Kieran Diaz           Imaging:  Imaging Results (last 24 hours)     ** No results found for the last 24 hours. **             Assessment and Plan:     Principal Problem:    Metabolic encephalopathy  Active Problems:    B12 deficiency    Carotid artery stenosis    CAD (coronary artery disease)    Chronic a-fib    DM type 2 (diabetes mellitus, type 2)    Urinary retention    Chronic indwelling Menjivar catheter    Acute CVA (cerebrovascular accident)   there is evidence of subdural hematoma on the left and acute stroke on the right neither of which can be worked up or treated other than how we are doing it.  He does not need a craniotomy nor further stroke workup.  Still confused but at this point moderately calm I do not see a lot else that I can add neurologically to what we are already doing.  I will continue his current Seroquel with the when necessary Geodon we will see him intermittently and I would not recommend further neurologic workup thanks      Jorge Garcia MD  04/11/17  2:06 PM

## 2017-04-12 NOTE — ANESTHESIA POSTPROCEDURE EVALUATION
Patient: Aime Hernandez    Procedure Summary     Date Anesthesia Start Anesthesia Stop Room / Location    04/12/17 1425 1500  [facility] /  [facility]       Procedure Diagnosis Surgeon Provider    ESOPHAGOGASTRODUODENOSCOPY WITH GASTROSTOMY TUBE INSERTION (N/A Esophagus) B12 deficiency  (B12 deficiency [E53.8]) MD Jolly Deutsch MD          Anesthesia Type: MAC  Last vitals  /78 (04/12/17 1536)    Temp      Pulse 92 (04/12/17 1536)   Resp 16 (04/12/17 1536)    SpO2 100 % (04/12/17 1536)      Post Anesthesia Care and Evaluation    Patient location during evaluation: PACU  Patient participation: complete - patient participated  Level of consciousness: awake and alert  Pain management: adequate  Airway patency: patent  Anesthetic complications: No anesthetic complications    Cardiovascular status: acceptable  Respiratory status: acceptable  Hydration status: acceptable

## 2017-04-12 NOTE — PLAN OF CARE
Problem: Patient Care Overview (Adult)  Goal: Plan of Care Review  Outcome: Ongoing (interventions implemented as appropriate)    04/12/17 0353   Patient Care Overview   Progress no change   Outcome Evaluation   Outcome Summary/Follow up Plan patient slightly more alert than previously. Answered to his name this evening as well as tried to verbalize his name. NPO since midnight for peg tube today. Continues in restraints. VSS. Continue to monitor.    Coping/Psychosocial Response Interventions   Plan Of Care Reviewed With patient       Goal: Discharge Needs Assessment  Outcome: Ongoing (interventions implemented as appropriate)    Problem: Fall Risk (Adult)  Goal: Identify Related Risk Factors and Signs and Symptoms  Outcome: Ongoing (interventions implemented as appropriate)  Goal: Absence of Falls  Outcome: Ongoing (interventions implemented as appropriate)    Problem: SAFETY - NON-VIOLENT RESTRAINT  Goal: Remains free of injury from restraints (Non-Violent Restraint)  Outcome: Ongoing (interventions implemented as appropriate)  Goal: Free from restraint(s) (Non-Violent Restraint)  Outcome: Ongoing (interventions implemented as appropriate)    Problem: Infection, Risk/Actual (Adult)  Goal: Identify Related Risk Factors and Signs and Symptoms  Outcome: Ongoing (interventions implemented as appropriate)  Goal: Infection Prevention/Resolution  Outcome: Ongoing (interventions implemented as appropriate)    Problem: Confusion, Acute (Adult)  Goal: Identify Related Risk Factors and Signs and Symptoms  Outcome: Ongoing (interventions implemented as appropriate)  Goal: Cognitive/Functional Impairments Minimized  Outcome: Ongoing (interventions implemented as appropriate)  Goal: Safety  Outcome: Ongoing (interventions implemented as appropriate)    Problem: Nutrition, Enteral (Adult)  Goal: Signs and Symptoms of Listed Potential Problems Will be Absent or Manageable (Nutrition, Enteral)  Outcome: Ongoing (interventions  implemented as appropriate)

## 2017-04-12 NOTE — PROGRESS NOTES
" LOS: 15 days   Primary Care Physician: JANIE Urena     Subjective  Answering yes no questions and responding to name. Denies pain, CP, SOA, abd pain.    Vital Signs  Body mass index is 25.78 kg/(m^2).  Temp:  [97.4 °F (36.3 °C)-98.3 °F (36.8 °C)] 97.6 °F (36.4 °C)  Heart Rate:  [] 104  Resp:  [18-20] 20  BP: ()/(53-94) 104/76      Objective:  General Appearance:  Comfortable and in no acute distress (restrained).    Vital signs: (most recent): Blood pressure 104/76, pulse 104, temperature 97.6 °F (36.4 °C), temperature source Oral, resp. rate 20, height 72\" (182.9 cm), weight 190 lb 1.6 oz (86.2 kg), SpO2 97 %.  No fever.    Lungs:  Normal respiratory rate and normal effort.  He is not in respiratory distress.  Breath sounds clear to auscultation.  No rhonchi.    Heart: Normal rate.  Regular rhythm.    Abdomen: Abdomen is soft.  There is no abdominal tenderness.  There is no rebound tenderness.  There is no guarding.     Extremities: There is no dependent edema.    Neurological: (Confused. Somnolent but awakens.).    Skin:  Warm and dry.                Results Review:    I reviewed the patient's new clinical results.      Results from last 7 days  Lab Units 04/12/17  0525 04/11/17  1051   WBC 10*3/mm3 8.74 7.82   HEMOGLOBIN g/dL 11.1* 10.4*   PLATELETS 10*3/mm3 169 168       Results from last 7 days  Lab Units 04/12/17  0525 04/11/17  0500   SODIUM mmol/L 143 142   POTASSIUM mmol/L 3.4* 3.7   CHLORIDE mmol/L 103 104   TOTAL CO2 mmol/L 28.2 26.9   BUN mg/dL 13 13   CREATININE mg/dL 0.55* 0.55*   CALCIUM mg/dL 8.3* 8.2*   GLUCOSE mg/dL 181* 296*       Results from last 7 days  Lab Units 04/12/17  0525 04/11/17  1051   INR  1.13* 1.22*     Hemoglobin A1C:  Lab Results   Component Value Date    HGBA1C 6.60 (H) 04/03/2017       Glucose Range:  Glucose   Date/Time Value Ref Range Status   04/12/2017 0545 131 (H) 70 - 130 mg/dL Final   04/11/2017 2200 264 (H) 70 - 130 mg/dL Final   04/11/2017 1704 " 243 (H) 70 - 130 mg/dL Final   04/11/2017 1148 297 (H) 70 - 130 mg/dL Final   04/11/2017 0739 257 (H) 70 - 130 mg/dL Final   04/10/2017 2140 248 (H) 70 - 130 mg/dL Final       Medication Review: Yes    Physical Therapy:    Assessment/Plan     Active Hospital Problems (** Indicates Principal Problem)    Diagnosis Date Noted   • **Metabolic encephalopathy [G93.41] 03/28/2017   • Acute CVA (cerebrovascular accident) [I63.9] 04/07/2017   • Urinary retention [R33.9] 03/28/2017   • Chronic indwelling Menjivar catheter [Z92.89] 03/28/2017   • DM type 2 (diabetes mellitus, type 2) [E11.9] 02/13/2017   • Chronic a-fib [I48.2] 02/13/2017   • CAD (coronary artery disease) [I25.10] 02/13/2017   • Carotid artery stenosis [I65.29] 12/15/2016   • B12 deficiency [E53.8] 02/08/2016      Resolved Hospital Problems    Diagnosis Date Noted Date Resolved   • Diarrhea [R19.7] 03/29/2017 04/08/2017   • Acute cystitis with hematuria [N30.01] 03/28/2017 04/08/2017   • Thrombocytopenia [D69.6] 04/25/2016 04/08/2017       Assessment & Plan  -Completed Abx for UTI.  -Arousable. Continue Depakote and Keppra per Neurology.  -Plan PEG today. GI following.  -Sodium level is stable on TF. Potassium mildly low. Replacement ordered. Will check Mag level.  -A1c 6.6. Req 46units yesterday, Continue levemir 24 units and SSI. Currently off TF for procedure.    Disposition: TBD    Jhony Knott MD  04/12/17  8:57 AM

## 2017-04-12 NOTE — PLAN OF CARE
Problem: Patient Care Overview (Adult)  Goal: Plan of Care Review  Outcome: Ongoing (interventions implemented as appropriate)    04/12/17 0353 04/12/17 0811 04/12/17 1809   Patient Care Overview   Progress no change --  --    Outcome Evaluation   Outcome Summary/Follow up Plan --  --  pt more alert today, can state name, nothing else. had peg placed successfully. only meds and water until AM. VSS. restraints continued.    Coping/Psychosocial Response Interventions   Plan Of Care Reviewed With --  patient;daughter --

## 2017-04-12 NOTE — ANESTHESIA PREPROCEDURE EVALUATION
Anesthesia Evaluation     Patient summary reviewed and Nursing notes reviewed   NPO Status: > 8 hours   Airway   Mallampati: II  TM distance: >3 FB  Neck ROM: full  no difficulty expected  Dental - normal exam     Pulmonary - negative pulmonary ROS and normal exam   Cardiovascular - normal exam  Exercise tolerance: good (4-7 METS)    ECG reviewed  Patient on routine beta blocker and Beta blocker given within 24 hours of surgery  Rhythm: regular  Rate: normal    (+) hypertension poorly controlled, past MI , CAD, CABG, dysrhythmias Atrial Fib, PVD,       Neuro/Psych  (+) CVA residual symptoms, psychiatric history, dementia, poor historian.,    GI/Hepatic/Renal/Endo    (+) obesity,  diabetes mellitus type 2,     Musculoskeletal (-) negative ROS    Abdominal  - normal exam   Substance History - negative use     OB/GYN          Other - negative ROS                                   Anesthesia Plan    ASA 4     MAC     intravenous induction

## 2017-04-13 NOTE — PLAN OF CARE
Problem: Patient Care Overview (Adult)  Goal: Plan of Care Review  Outcome: Ongoing (interventions implemented as appropriate)    04/13/17 0645 04/13/17 1828   Patient Care Overview   Progress progress toward functional goals is gradual --    Outcome Evaluation   Outcome Summary/Follow up Plan --  PT restarted on tube feeds at 1300 Glucerna 1.2 & Water 35 mL flush every hour,start at 25mL/hr next advance at 2000 of 20 mL/hr. 5cc residual pt tolerating feeds. No signs/symptoms of agitation continues to remain lethargic, speech incomprehensible. PEG site intact.   Coping/Psychosocial Response Interventions   Plan Of Care Reviewed With --  patient         Problem: Fall Risk (Adult)  Goal: Identify Related Risk Factors and Signs and Symptoms  Outcome: Ongoing (interventions implemented as appropriate)    04/13/17 0859   Fall Risk   Fall Risk: Related Risk Factors age-related changes;confusion/agitation;neuro disease/injury   Fall Risk: Signs and Symptoms presence of risk factors       Goal: Absence of Falls  Outcome: Ongoing (interventions implemented as appropriate)    04/13/17 1828   Fall Risk (Adult)   Absence of Falls making progress toward outcome         Problem: Infection, Risk/Actual (Adult)  Goal: Identify Related Risk Factors and Signs and Symptoms  Outcome: Ongoing (interventions implemented as appropriate)    04/13/17 0859   Infection, Risk/Actual   Infection, Risk/Actual: Related Risk Factors age extremes;prolonged hospitalization;malnutrition;skin integrity impairment   Signs and Symptoms (Infection, Risk/Actual) edema       Goal: Infection Prevention/Resolution  Outcome: Ongoing (interventions implemented as appropriate)    04/13/17 1828   Infection, Risk/Actual (Adult)   Infection Prevention/Resolution making progress toward outcome         Problem: Confusion, Acute (Adult)  Goal: Identify Related Risk Factors and Signs and Symptoms  Outcome: Ongoing (interventions implemented as appropriate)    04/13/17  0859   Confusion, Acute   Related Risk Factors (Acute Confusion) cognitive impairment;neurological impairment;sensory deprivation   Signs and Symptoms (Acute Confusion) disorientation       Goal: Cognitive/Functional Impairments Minimized  Outcome: Ongoing (interventions implemented as appropriate)    04/13/17 1828   Confusion, Acute (Adult)   Cognitive/Functional Impairments Minimized making progress toward outcome       Goal: Safety  Outcome: Ongoing (interventions implemented as appropriate)    04/13/17 1828   Confusion, Acute (Adult)   Safety making progress toward outcome         Problem: Nutrition, Enteral (Adult)  Goal: Signs and Symptoms of Listed Potential Problems Will be Absent or Manageable (Nutrition, Enteral)  Outcome: Ongoing (interventions implemented as appropriate)    04/13/17 0859   Nutrition, Enteral   Problems Assessed (Enteral Nutrition) diarrhea;electrolyte imbalance;fluid imbalance;skin breakdown   Problems Present (Enteral Nutrition) diarrhea;skin breakdown

## 2017-04-13 NOTE — PROGRESS NOTES
BGA/GI Progress Note   Chief Complaint:  PEG consult, dysphagia s/p CVA    Subjective     Interval History: Nonverbal, no distress.  S/p PEG 4/12/17, tube feeds to be started per nutrition recommendations.  Site clear, bumper pulled back .5cm    History taken from: chart RN    Review of Systems:    All systems were reviewed and negative except for:  Gastrointestinal: postitive for  difficulty / pain with swallowing    Objective     Vital Signs  Temp:  [97.4 °F (36.3 °C)-98.3 °F (36.8 °C)] 97.7 °F (36.5 °C)  Heart Rate:  [78-99] 86  Resp:  [16-24] 18  BP: ()/(50-86) 79/65  Body mass index is 25.78 kg/(m^2).    Intake/Output Summary (Last 24 hours) at 04/13/17 1228  Last data filed at 04/13/17 0815   Gross per 24 hour   Intake               60 ml   Output              900 ml   Net             -840 ml     I/O this shift:  In: 30 [Other:30]  Out: -     Physical Exam:   General: patient in bed, eyes closed, no distress   Eyes: Normal lids and lashes, no scleral icterus   Neck: supple, normal ROM, no tracheal deviation   Skin: warm and dry, not jaundiced   Cardiovascular: regular rhythm and rate, no murmurs auscultated   Pulm: clear to auscultation bilaterally, regular and unlabored   Abdomen: soft, nontender, nondistended; normal bowel sounds, PEG site clear, pulled back .5cm, abd binder in place   Rectal: deferred   Extremities:  Edema UE   Neurologic: nonverbal, not following commands, baseline per RN post CVA    All Medications Have Been Reviewed     Results Review:       Results from last 7 days  Lab Units 04/13/17  0838 04/12/17  0525 04/11/17  1051   WBC 10*3/mm3 8.12 8.74 7.82   HEMOGLOBIN g/dL 11.0* 11.1* 10.4*   HEMATOCRIT % 35.3* 35.2* 33.1*   PLATELETS 10*3/mm3 167 169 168         Results from last 7 days  Lab Units 04/13/17  0838 04/12/17  2315 04/12/17  0525 04/11/17  0500  04/07/17  0737   SODIUM mmol/L 143  --  143 142  < > 148*   POTASSIUM mmol/L 3.5 3.8 3.4* 3.7  < > 3.6   CHLORIDE mmol/L  103  --  103 104  < > 111*   TOTAL CO2 mmol/L 30.4*  --  28.2 26.9  < > 22.6   BUN mg/dL 12  --  13 13  < > 5*   CREATININE mg/dL 0.61*  --  0.55* 0.55*  < > 0.66*   CALCIUM mg/dL 8.2*  --  8.3* 8.2*  < > 7.8*   BILIRUBIN mg/dL  --   --  0.5  --   --  0.4   ALK PHOS U/L  --   --  104  --   --  139*   ALT (SGPT) U/L  --   --  25  --   --  35   AST (SGOT) U/L  --   --  26  --   --  32   GLUCOSE mg/dL 101*  --  181* 296*  < > 221*   < > = values in this interval not displayed.      Results from last 7 days  Lab Units 04/12/17  0525 04/11/17  1051   INR  1.13* 1.22*       RADIOLOGY:    Imaging Results (last 72 hours)     ** No results found for the last 72 hours. **          Assessment/Plan     Patient Active Problem List   Diagnosis Code   • B12 deficiency E53.8   • Carotid artery stenosis I65.29   • CAD (coronary artery disease) I25.10   • Chronic a-fib I48.2   • DM type 2 (diabetes mellitus, type 2) E11.9   • Acute posthemorrhagic anemia D62   • Urinary retention R33.9   • Chronic indwelling Menjivar catheter Z92.89   • Metabolic encephalopathy G93.41   • Acute CVA (cerebrovascular accident) I63.9       Elvie Castañeda, TARIQ  04/13/17  12:28 PM    Tolerating tube feeds.  No bleeding or drainage from peg site    abd - soft, nondistended, nontender - site of peg clean and intact without drainage/bleeding    Labs reviewed by me -  stable      Assessment:  1) Dysphagia s/p CVA- s/p PEG placement on 4/12/17.  Site clear.  Tube feeds per nutrition recommendation.    GI to sign off, can see as needed.

## 2017-04-13 NOTE — PROGRESS NOTES
" LOS: 16 days   Primary Care Physician: JANIE Urena     Subjective  Resting comfortable and out of restraints this morning. Tolerated PEG placement yesterday. No CP or SOA. Not answering all yes/no questions today.    Vital Signs  Body mass index is 25.78 kg/(m^2).  Temp:  [97.4 °F (36.3 °C)-98.3 °F (36.8 °C)] 98 °F (36.7 °C)  Heart Rate:  [78-99] 92  Resp:  [16-24] 16  BP: ()/(50-86) 111/58      Objective:  General Appearance:  Comfortable and in no acute distress (restrained).    Vital signs: (most recent): Blood pressure 111/58, pulse 92, temperature 98 °F (36.7 °C), temperature source Oral, resp. rate 16, height 72\" (182.9 cm), weight 190 lb 1.6 oz (86.2 kg), SpO2 95 %.  Vital signs are normal.  No fever.    Lungs:  Normal respiratory rate and normal effort.  He is not in respiratory distress.  Breath sounds clear to auscultation.  No rhonchi.    Heart: Normal rate.  Regular rhythm.    Abdomen: Abdomen is soft.  There is no abdominal tenderness.  There is no rebound tenderness.  There is no guarding.     Extremities: There is no dependent edema.    Neurological: Patient has normal muscle tone.  (Confused. Somnolent but awakens.).    Skin:  Warm and dry.                Results Review:    I reviewed the patient's new clinical results.  I reviewed the patient's other test results and agree with the interpretation      Results from last 7 days  Lab Units 04/12/17  0525 04/11/17  1051   WBC 10*3/mm3 8.74 7.82   HEMOGLOBIN g/dL 11.1* 10.4*   PLATELETS 10*3/mm3 169 168       Results from last 7 days  Lab Units 04/12/17  2315 04/12/17  0525 04/11/17  0500   SODIUM mmol/L  --  143 142   POTASSIUM mmol/L 3.8 3.4* 3.7   CHLORIDE mmol/L  --  103 104   TOTAL CO2 mmol/L  --  28.2 26.9   BUN mg/dL  --  13 13   CREATININE mg/dL  --  0.55* 0.55*   CALCIUM mg/dL  --  8.3* 8.2*   GLUCOSE mg/dL  --  181* 296*       Results from last 7 days  Lab Units 04/12/17  0525 04/11/17  1051   INR  1.13* 1.22*     Hemoglobin " A1C:  Lab Results   Component Value Date    HGBA1C 6.60 (H) 04/03/2017       Glucose Range:  Glucose   Date/Time Value Ref Range Status   04/13/2017 0614 109 70 - 130 mg/dL Final   04/12/2017 2348 149 (H) 70 - 130 mg/dL Final   04/12/2017 1630 154 (H) 70 - 130 mg/dL Final   04/12/2017 1134 177 (H) 70 - 130 mg/dL Final   04/12/2017 0545 131 (H) 70 - 130 mg/dL Final   04/11/2017 2200 264 (H) 70 - 130 mg/dL Final       Medication Review: Yes    Physical Therapy:    Assessment/Plan     Active Hospital Problems (** Indicates Principal Problem)    Diagnosis Date Noted   • **Metabolic encephalopathy [G93.41] 03/28/2017   • Acute CVA (cerebrovascular accident) [I63.9] 04/07/2017   • Urinary retention [R33.9] 03/28/2017   • Chronic indwelling Menjivar catheter [Z92.89] 03/28/2017   • DM type 2 (diabetes mellitus, type 2) [E11.9] 02/13/2017   • Chronic a-fib [I48.2] 02/13/2017   • CAD (coronary artery disease) [I25.10] 02/13/2017   • Carotid artery stenosis [I65.29] 12/15/2016   • B12 deficiency [E53.8] 02/08/2016      Resolved Hospital Problems    Diagnosis Date Noted Date Resolved   • Diarrhea [R19.7] 03/29/2017 04/08/2017   • Acute cystitis with hematuria [N30.01] 03/28/2017 04/08/2017   • Thrombocytopenia [D69.6] 04/25/2016 04/08/2017       Assessment & Plan  -Completed Abx for UTI.  -Arousable. Continue Depakote and Keppra per Neurology.  -GI following. Sp PEG 04/12. Nutrition following for TF.  -Sodium level is stable on TF. Potassium replaced. Mag pending.  -A1c 6.6. Continue levemir 24 units and SSI. Did not require SSI yesteday but was off TF for PEG placement. Will monitor.    Disposition: [facility], few days    Jhony Knott MD  04/13/17  8:13 AM

## 2017-04-13 NOTE — PLAN OF CARE
Problem: SAFETY - NON-VIOLENT RESTRAINT  Goal: Remains free of injury from restraints (Non-Violent Restraint)  Outcome: Outcome(s) achieved Date Met:  04/13/17  Restraints discontinued at 0000. Patient tolerated removal for duration of shift.  Goal: Free from restraint(s) (Non-Violent Restraint)  Outcome: Outcome(s) achieved Date Met:  04/13/17

## 2017-04-13 NOTE — NURSING NOTE
Administered one dose of D50W rechecked blood glucose, measured at 112 no further action needed. Will continue to monitor.

## 2017-04-13 NOTE — PLAN OF CARE
Problem: Patient Care Overview (Adult)  Goal: Plan of Care Review  Outcome: Ongoing (interventions implemented as appropriate)    04/13/17 0645   Patient Care Overview   Progress progress toward functional goals is gradual   Outcome Evaluation   Outcome Summary/Follow up Plan Patient exhibits reduced agitation today; restraints discontinued with successful maintenance of lines/tubes. Patient remains lethargic, arousable to care only, speech incomprehensible. VSS. PEG site appears intact, minimal bleeding.    Coping/Psychosocial Response Interventions   Plan Of Care Reviewed With patient         Problem: Fall Risk (Adult)  Goal: Identify Related Risk Factors and Signs and Symptoms  Outcome: Ongoing (interventions implemented as appropriate)  Goal: Absence of Falls  Outcome: Ongoing (interventions implemented as appropriate)    Problem: Infection, Risk/Actual (Adult)  Goal: Identify Related Risk Factors and Signs and Symptoms  Outcome: Ongoing (interventions implemented as appropriate)  Goal: Infection Prevention/Resolution  Outcome: Ongoing (interventions implemented as appropriate)    Problem: Confusion, Acute (Adult)  Goal: Identify Related Risk Factors and Signs and Symptoms  Outcome: Ongoing (interventions implemented as appropriate)  Goal: Cognitive/Functional Impairments Minimized  Outcome: Ongoing (interventions implemented as appropriate)  Goal: Safety  Outcome: Ongoing (interventions implemented as appropriate)    Problem: Nutrition, Enteral (Adult)  Goal: Signs and Symptoms of Listed Potential Problems Will be Absent or Manageable (Nutrition, Enteral)  Outcome: Ongoing (interventions implemented as appropriate)

## 2017-04-14 NOTE — PLAN OF CARE
Problem: Patient Care Overview (Adult)  Goal: Plan of Care Review    04/14/17 0513   Patient Care Overview   Progress progress toward functional goals is gradual   Outcome Evaluation   Outcome Summary/Follow up Plan opens eyes to voice, speech remains incomprehensible on most occassions, tolerating tube feeds         Problem: Fall Risk (Adult)  Goal: Absence of Falls  Outcome: Ongoing (interventions implemented as appropriate)

## 2017-04-14 NOTE — PROGRESS NOTES
" LOS: 17 days   Primary Care Physician: JANIE Urena     Subjective  Tolerating TF. Answering some yes/no questions. No CP SOA or pain.    Vital Signs  Body mass index is 25.78 kg/(m^2).  Temp:  [97.6 °F (36.4 °C)-98.1 °F (36.7 °C)] 98.1 °F (36.7 °C)  Heart Rate:  [] 87  Resp:  [18] 18  BP: (104-129)/(66-75) 117/75      Objective:  General Appearance:  Comfortable and in no acute distress (restrained).    Vital signs: (most recent): Blood pressure 117/75, pulse 87, temperature 98.1 °F (36.7 °C), temperature source Oral, resp. rate 18, height 72\" (182.9 cm), weight 190 lb 1.6 oz (86.2 kg), SpO2 95 %.  Vital signs are normal.  No fever.    Lungs:  Normal respiratory rate and normal effort.  He is not in respiratory distress.  Breath sounds clear to auscultation.  No rhonchi.    Heart: Normal rate.  Regular rhythm.    Abdomen: Abdomen is soft.  (PEG)There is no abdominal tenderness.  There is no rebound tenderness.  There is no guarding.     Extremities: There is no dependent edema.    Neurological: Patient has normal muscle tone.  (Confused. Somnolent but awakens.).    Skin:  Warm and dry.                Results Review:    I reviewed the patient's new clinical results.  I reviewed the patient's new imaging results and agree with the interpretation.      Results from last 7 days  Lab Units 04/14/17  0619 04/13/17  0838   WBC 10*3/mm3 7.88 8.12   HEMOGLOBIN g/dL 10.7* 11.0*   PLATELETS 10*3/mm3 134* 167       Results from last 7 days  Lab Units 04/14/17  0619 04/13/17  0838   SODIUM mmol/L 139 143   POTASSIUM mmol/L 3.2* 3.5   CHLORIDE mmol/L 101 103   TOTAL CO2 mmol/L 27.1 30.4*   BUN mg/dL 12 12   CREATININE mg/dL 0.56* 0.61*   CALCIUM mg/dL 8.3* 8.2*   GLUCOSE mg/dL 116* 101*       Results from last 7 days  Lab Units 04/12/17  0525 04/11/17  1051   INR  1.13* 1.22*     Hemoglobin A1C:  Lab Results   Component Value Date    HGBA1C 6.60 (H) 04/03/2017       Glucose Range:  Glucose   Date/Time Value " Ref Range Status   04/14/2017 0722 126 70 - 130 mg/dL Final   04/14/2017 0002 106 70 - 130 mg/dL Final   04/13/2017 2131 71 70 - 130 mg/dL Final   04/13/2017 1642 86 70 - 130 mg/dL Final   04/13/2017 1137 67 (L) 70 - 130 mg/dL Final   04/13/2017 0614 109 70 - 130 mg/dL Final       Medication Review: Yes    Physical Therapy:    Assessment/Plan     Active Hospital Problems (** Indicates Principal Problem)    Diagnosis Date Noted   • **Metabolic encephalopathy [G93.41] 03/28/2017   • Acute CVA (cerebrovascular accident) [I63.9] 04/07/2017   • Urinary retention [R33.9] 03/28/2017   • Chronic indwelling Menjivar catheter [Z92.89] 03/28/2017   • DM type 2 (diabetes mellitus, type 2) [E11.9] 02/13/2017   • Chronic a-fib [I48.2] 02/13/2017   • CAD (coronary artery disease) [I25.10] 02/13/2017   • Carotid artery stenosis [I65.29] 12/15/2016   • B12 deficiency [E53.8] 02/08/2016      Resolved Hospital Problems    Diagnosis Date Noted Date Resolved   • Diarrhea [R19.7] 03/29/2017 04/08/2017   • Acute cystitis with hematuria [N30.01] 03/28/2017 04/08/2017   • Thrombocytopenia [D69.6] 04/25/2016 04/08/2017       Assessment & Plan  -Completed Abx for UTI.  -Arousable. Continue Depakote and Keppra per Neurology.  -GI following. Sp PEG 04/12. Nutrition following for TF.  -Sodium level is stable on TF. Potassium replaced. Mag pending.  -A1c 6.6. Continue levemir 24 units and SSI. Did not require SSI yesterday. Will monitor.     Disposition: SNF, tomorrow likely    Jhony Knott MD  04/14/17  10:17 AM

## 2017-04-14 NOTE — PROGRESS NOTES
Continued Stay Note  [facility]     Patient Name: Aime Hernandez  MRN: [id number]  Today's Date: 4/14/2017    Admit Date: 3/28/2017          Discharge Plan       04/14/17 1451    Case Management/Social Work Plan    Plan [facility]    Additional Comments Notified Geovani with [facility] of anticipated dc tomorrow.      Final Note    Final Note skilled bed at [facility]                   Expected Discharge Date and Time     Expected Discharge Date Expected Discharge Time    Apr 15, 2017             Krystian Westbrook RN

## 2017-04-14 NOTE — PLAN OF CARE
Problem: Patient Care Overview (Adult)  Goal: Plan of Care Review  Outcome: Ongoing (interventions implemented as appropriate)    04/14/17 0513 04/14/17 1856   Patient Care Overview   Progress progress toward functional goals is gradual --    Outcome Evaluation   Outcome Summary/Follow up Plan --  PT opnes eyes to voice, speech. Reached goal rate of 70mL/hr on Glucerna 1.2 tube feeds.   Coping/Psychosocial Response Interventions   Plan Of Care Reviewed With --  patient         Problem: Fall Risk (Adult)  Goal: Identify Related Risk Factors and Signs and Symptoms  Outcome: Ongoing (interventions implemented as appropriate)  Goal: Absence of Falls  Outcome: Ongoing (interventions implemented as appropriate)    04/14/17 1856   Fall Risk (Adult)   Absence of Falls making progress toward outcome         Problem: Infection, Risk/Actual (Adult)  Goal: Identify Related Risk Factors and Signs and Symptoms  Outcome: Ongoing (interventions implemented as appropriate)    04/13/17 0859   Infection, Risk/Actual   Infection, Risk/Actual: Related Risk Factors age extremes;prolonged hospitalization;malnutrition;skin integrity impairment   Signs and Symptoms (Infection, Risk/Actual) edema       Goal: Infection Prevention/Resolution  Outcome: Ongoing (interventions implemented as appropriate)    04/14/17 1856   Infection, Risk/Actual (Adult)   Infection Prevention/Resolution making progress toward outcome         Problem: Confusion, Acute (Adult)  Goal: Identify Related Risk Factors and Signs and Symptoms  Outcome: Ongoing (interventions implemented as appropriate)    04/13/17 0859   Confusion, Acute   Related Risk Factors (Acute Confusion) cognitive impairment;neurological impairment;sensory deprivation   Signs and Symptoms (Acute Confusion) disorientation       Goal: Cognitive/Functional Impairments Minimized  Outcome: Outcome(s) achieved Date Met:  04/14/17 04/14/17 1856   Confusion, Acute (Adult)   Cognitive/Functional  Impairments Minimized making progress toward outcome       Goal: Safety  Outcome: Ongoing (interventions implemented as appropriate)    04/14/17 1856   Confusion, Acute (Adult)   Safety making progress toward outcome         Problem: Nutrition, Enteral (Adult)  Goal: Signs and Symptoms of Listed Potential Problems Will be Absent or Manageable (Nutrition, Enteral)  Outcome: Ongoing (interventions implemented as appropriate)    04/13/17 0859   Nutrition, Enteral   Problems Assessed (Enteral Nutrition) diarrhea;electrolyte imbalance;fluid imbalance;skin breakdown   Problems Present (Enteral Nutrition) diarrhea;skin breakdown         Problem: Stroke (Ischemic) (Adult)  Goal: Signs and Symptoms of Listed Potential Problems Will be Absent or Manageable (Stroke)  Outcome: Ongoing (interventions implemented as appropriate)    04/14/17 1856   Stroke (Ischemic)   Problems Assessed (Stroke (Ischemic)/TIA) all   Problems Present (Stroke (Ischemic)/TIA) bladder/bowel dysfunction;eating/swallowing impairment;communication impairment;acute neurologic deterioration;muscle tone abnormal;cognitive impairment;hemodynamic instability         Problem: Skin Integrity Impairment, Risk/Actual (Adult)  Goal: Identify Related Risk Factors and Signs and Symptoms  Outcome: Outcome(s) achieved Date Met:  04/14/17 04/14/17 1856   Skin Integrity Impairment, Risk/Actual   Skin Integrity Impairment, Risk/Actual: Related Risk Factors age extremes;cognitive impairment;fluid/nutrition status;immobility;sensory impairment;moisture   Signs and Symptoms (Skin Integrity Impairment) edema;rash       Goal: Skin Integrity/Wound Healing  Outcome: Ongoing (interventions implemented as appropriate)    04/14/17 1856   Skin Integrity Impairment, Risk/Actual (Adult)   Skin Integrity/Wound Healing making progress toward outcome

## 2017-04-15 NOTE — DISCHARGE SUMMARY
Date of Admission: 3/28/2017  Date of Discharge:  4/15/2017  Primary Care Physician: Levi Peterson PA     Discharge Diagnosis:  Active Hospital Problems (** Indicates Principal Problem)    Diagnosis Date Noted   • **Metabolic encephalopathy [G93.41] 03/28/2017   • Acute CVA (cerebrovascular accident) [I63.9] 04/07/2017   • Urinary retention [R33.9] 03/28/2017   • Chronic indwelling Menjivar catheter [Z92.89] 03/28/2017   • DM type 2 (diabetes mellitus, type 2) [E11.9] 02/13/2017   • Chronic a-fib [I48.2] 02/13/2017   • CAD (coronary artery disease) [I25.10] 02/13/2017   • Carotid artery stenosis [I65.29] 12/15/2016   • B12 deficiency [E53.8] 02/08/2016      Resolved Hospital Problems    Diagnosis Date Noted Date Resolved   • Diarrhea [R19.7] 03/29/2017 04/08/2017   • Acute cystitis with hematuria [N30.01] 03/28/2017 04/08/2017   • Thrombocytopenia [D69.6] 04/25/2016 04/08/2017       Presenting Problem/History of Present Illness  Acute cystitis with hematuria [N30.01]  Toxic metabolic encephalopathy [G92]  Acute ischemic stroke [I63.9]   History is extremely limited unable to reach family by phone and patient is encephalopathic and not very cooperative. Essentially this is a 71-year-old gentleman who presents from the nursing home for altered mental status. He was doing okay at the nursing home Saturday with family evaluated him at that time. However when they went to visit him today he was completely confused and unable to cooperate. He was unable to do most of his ADLs aren't functioning today. It's unclear if he's had any fevers or chills or sick contacts there he has had an indwelling Menjivar since discharge from the hospital.     Physical Exam (Day of DC)  General Appearance: Comfortable and in no acute distress (restrained).   Lungs: Normal respiratory rate and normal effort. He is not in respiratory distress. Breath sounds clear to auscultation. No rhonchi.   Heart: Normal rate. Regular rhythm.   Abdomen:  Abdomen is soft. (PEG)There is no abdominal tenderness. There is no rebound tenderness. There is no guarding.   Extremities: There is no dependent edema.   Neurological: Patient has normal muscle tone. (Confused. Somnolent but awakens.).   Skin: Warm and dry.     Hospital Course  Patient is a 72 y.o. male who presented with acute CVA on R and subdural hematoma on left. He was maintained on Eliquis, Atorvastatin and ASA once SDH was confirmed to not be worsening. Neurology was consulted and workup completed with no additional treatment or workup available. He remained confused and on Keppra, Valproic Acid, and Seroquel. He was found to have enterococcus UTI and completed antibiotic therapy while in house, mildly improved mentation to the point he would respond to yes/no questions however he is still intermittently drowsy and not always meaningfully interactive. He has chronic urinary retention and was maintained with rodriguez catheter. He did not pass swallow evaluation and GI was consulted. He underewent PEG placement and TF were started. He tolerated those well. Labs are stable. A1c 6.6, will be discharged with home insulin and metformin regimen. He will need Neurology, Cardiology, and GI follow-up in addition to PCP follow-up.    Procedures Performed:  Procedure(s):  ESOPHAGOGASTRODUODENOSCOPY WITH GASTROSTOMY TUBE INSERTION  04/12 1424 UPPER GI ENDOSCOPY    Consults:   Consults     Date and Time Order Name Status Description    4/10/2017 1609 Inpatient Consult to Gastroenterology Completed     3/29/2017 1654 Inpatient Consult to Neurology Completed     3/29/2017 0024 Inpatient Consult to Cardiology Completed     3/28/2017 1813 [facility] (on-call MD unless specified) Completed     3/13/2017 1348 Neurosurgery (on-call MD unless specified) In process     3/12/2017 1358 Pulmonology (on-call MD unless specified) Completed              Condition on Discharge: Stable    Discharge Disposition  Skilled Nursing Facility ([place] -  [place])    Discharge Medications:   Aime Hernandez   Home Medication Instructions JOHN:[id number]    Printed on:04/15/17 2339   Medication Information                      acetaminophen (TYLENOL) 325 MG tablet  Take 650 mg by mouth Every 6 (Six) Hours As Needed for mild pain (1-3).             aluminum sulfate-calcium acetate (DOMEBORO) packet  Apply 1 packet topically Every 8 (Eight) Hours.             apixaban (ELIQUIS) 5 MG tablet tablet  Take 1 tablet by mouth 2 (Two) Times a Day.             aspirin 81 MG chewable tablet  Chew 1 tablet Daily.             atorvastatin (LIPITOR) 20 MG tablet  Take 4 tablets by mouth Daily.             B-D ULTRAFINE III SHORT PEN 31G X 8 MM misc               Bioflavonoid Products (VITAMIN C PLUS PO)  Take  by mouth Daily.             digoxin (LANOXIN) 125 MCG tablet  Take 1 tablet by mouth Daily.             insulin glargine (LANTUS) 100 UNIT/ML injection  Inject 20 Units under the skin daily.             levETIRAcetam (KEPPRA) 100 MG/ML solution  15 mL by Per G Tube route Every 12 (Twelve) Hours.             metFORMIN (GLUCOPHAGE) 1000 MG tablet  1,000 mg Daily With Breakfast.             metoprolol tartrate (LOPRESSOR) 50 MG tablet  Take 1 tablet by mouth Every 8 (Eight) Hours.             nitroglycerin (NITROSTAT) 0.4 MG SL tablet  Place 1 tablet under the tongue every 5 (five) minutes as needed for chest pain.             nystatin (MYCOSTATIN) 670831 UNIT/GM ointment  Apply  topically Every 12 (Twelve) Hours.             ondansetron ODT (ZOFRAN-ODT) 4 MG disintegrating tablet  Take 1 tablet by mouth Every 6 (Six) Hours As Needed for Nausea or Vomiting.             QUEtiapine (SEROquel) 25 MG tablet  Take 1 tablet by mouth 3 (Three) Times a Day.             Valproic Acid (DEPAKENE) 250 MG/5ML solution syrup  15 mL by Per G Tube route Every 8 (Eight) Hours.             VITAMIN E PO  Take  by mouth Daily.                 Discharge Diet:   Diet Instructions     Diet:  Nothing By Mouth, Tube Feeding; Continuous; Glucerna 1.2 (Diabetic); Tube Feeding Type: Continuous; Continuous Tube Feeding Start Rate (mL/hr): 25; Then Advance Rate By (mL/hr): 20; Every _ Hours: 8; To Goal Rate of (mL/hr): 70; Patient is N...       Discharge Diet:   Nothing By Mouth  Tube Feeding      Feeding Type:  Continuous   Formula & Rate:  Glucerna 1.2 (Diabetic); Tube Feeding Type: Continuous; Continuous Tube Feeding Start Rate (mL/hr): 25; Then Advance Rate By (mL/hr): 20; Every _ Hours: 8; To Goal Rate of (mL/hr): 70; Patient is NPO (No Tray)                 Activity at Discharge:   Activity Instructions     Activity as Tolerated                     Follow-up Appointments:  Future Appointments  Date Time Provider Department Center   4[facility]18[facility]2017 10:30 [facility]   4/18/2017 10:40 AM Basilio Briggs MD [facility] CBC LAG  CBC [place]   4/18/2017 11:00 AM CHAIR 4 [facility] INFUS [facility]   4/19/2017 9:30 AM Sea Sherwood MD [facility] CD LCGKR None   6/19/2017 10:00 AM Sea Sherwood MD [facility] CD LCGEP None     Additional Instructions for the Follow-ups that You Need to Schedule     Discharge Follow-up with PCP    As directed    Follow Up Details:  per facility protocol       Discharge Follow-up with Specialty    As directed    Specialty:  Neurology   Follow Up Details:  as scheduled                 Test Results Pending at Discharge       Jhony Knott MD  04/15/17  7:17 AM    Time Spent on Discharge Activities: >30minutes

## 2017-04-15 NOTE — PLAN OF CARE
Problem: Patient Care Overview (Adult)  Goal: Plan of Care Review  Outcome: Ongoing (interventions implemented as appropriate)    04/15/17 1024   Patient Care Overview   Progress no change   Outcome Evaluation   Outcome Summary/Follow up Plan Tolerating TF with minimal residual. Still with perineal excoriation. To be d/c to [place] [facility] today.   Coping/Psychosocial Response Interventions   Plan Of Care Reviewed With patient         Problem: Fall Risk (Adult)  Goal: Identify Related Risk Factors and Signs and Symptoms  Outcome: Ongoing (interventions implemented as appropriate)  Goal: Absence of Falls  Outcome: Ongoing (interventions implemented as appropriate)    Problem: Infection, Risk/Actual (Adult)  Goal: Identify Related Risk Factors and Signs and Symptoms  Outcome: Ongoing (interventions implemented as appropriate)  Goal: Infection Prevention/Resolution  Outcome: Ongoing (interventions implemented as appropriate)    Problem: Confusion, Acute (Adult)  Goal: Identify Related Risk Factors and Signs and Symptoms  Outcome: Ongoing (interventions implemented as appropriate)    Problem: Nutrition, Enteral (Adult)  Goal: Signs and Symptoms of Listed Potential Problems Will be Absent or Manageable (Nutrition, Enteral)  Outcome: Ongoing (interventions implemented as appropriate)  [place] and Nystatin in use    Problem: Stroke (Ischemic) (Adult)  Goal: Signs and Symptoms of Listed Potential Problems Will be Absent or Manageable (Stroke)  Outcome: Ongoing (interventions implemented as appropriate)    Problem: Skin Integrity Impairment, Risk/Actual (Adult)  Goal: Skin Integrity/Wound Healing  Outcome: Ongoing (interventions implemented as appropriate)  Waffle boots in place to offload heels

## (undated) DEVICE — TBG FEED PEG ENDOVIVE G PERC SFTY PUSH W/SYR 20F

## (undated) DEVICE — TUBING, SUCTION, 1/4" X 10', STRAIGHT: Brand: MEDLINE

## (undated) DEVICE — Device: Brand: DEFENDO AIR/WATER/SUCTION AND BIOPSY VALVE

## (undated) DEVICE — CANN NASL CO2 TRULINK W/O2 A/

## (undated) DEVICE — BITEBLOCK OMNI BLOC